# Patient Record
Sex: MALE | Race: WHITE | Employment: OTHER | URBAN - METROPOLITAN AREA
[De-identification: names, ages, dates, MRNs, and addresses within clinical notes are randomized per-mention and may not be internally consistent; named-entity substitution may affect disease eponyms.]

---

## 2017-07-18 ENCOUNTER — OFFICE VISIT (OUTPATIENT)
Dept: ORTHOPEDIC SURGERY | Age: 72
End: 2017-07-18

## 2017-07-18 VITALS
WEIGHT: 188.93 LBS | BODY MASS INDEX: 29.65 KG/M2 | SYSTOLIC BLOOD PRESSURE: 152 MMHG | DIASTOLIC BLOOD PRESSURE: 80 MMHG | HEART RATE: 62 BPM | HEIGHT: 67 IN

## 2017-07-18 DIAGNOSIS — M16.0 PRIMARY OSTEOARTHRITIS OF BOTH HIPS: ICD-10-CM

## 2017-07-18 DIAGNOSIS — R29.898 WEAKNESS OF BOTH HIPS: ICD-10-CM

## 2017-07-18 DIAGNOSIS — M51.36 DDD (DEGENERATIVE DISC DISEASE), LUMBAR: ICD-10-CM

## 2017-07-18 DIAGNOSIS — M70.70 HIP BURSITIS, UNSPECIFIED LATERALITY: ICD-10-CM

## 2017-07-18 DIAGNOSIS — M54.5 LOW BACK PAIN, UNSPECIFIED BACK PAIN LATERALITY, UNSPECIFIED CHRONICITY, WITH SCIATICA PRESENCE UNSPECIFIED: Primary | ICD-10-CM

## 2017-07-18 PROBLEM — M51.369 DDD (DEGENERATIVE DISC DISEASE), LUMBAR: Status: ACTIVE | Noted: 2017-07-18

## 2017-07-18 PROBLEM — M54.50 LOW BACK PAIN: Status: ACTIVE | Noted: 2017-07-18

## 2017-07-18 PROCEDURE — 20610 DRAIN/INJ JOINT/BURSA W/O US: CPT | Performed by: FAMILY MEDICINE

## 2017-07-18 PROCEDURE — 1123F ACP DISCUSS/DSCN MKR DOCD: CPT | Performed by: FAMILY MEDICINE

## 2017-07-18 PROCEDURE — G8427 DOCREV CUR MEDS BY ELIG CLIN: HCPCS | Performed by: FAMILY MEDICINE

## 2017-07-18 PROCEDURE — 3017F COLORECTAL CA SCREEN DOC REV: CPT | Performed by: FAMILY MEDICINE

## 2017-07-18 PROCEDURE — 99203 OFFICE O/P NEW LOW 30 MIN: CPT | Performed by: FAMILY MEDICINE

## 2017-07-18 PROCEDURE — G8419 CALC BMI OUT NRM PARAM NOF/U: HCPCS | Performed by: FAMILY MEDICINE

## 2017-07-18 PROCEDURE — 4040F PNEUMOC VAC/ADMIN/RCVD: CPT | Performed by: FAMILY MEDICINE

## 2017-07-18 PROCEDURE — 4004F PT TOBACCO SCREEN RCVD TLK: CPT | Performed by: FAMILY MEDICINE

## 2017-07-18 RX ORDER — DICLOFENAC SODIUM 75 MG/1
75 TABLET, DELAYED RELEASE ORAL 2 TIMES DAILY WITH MEALS
Qty: 60 TABLET | Refills: 3 | Status: SHIPPED | OUTPATIENT
Start: 2017-07-18 | End: 2018-03-29 | Stop reason: SDUPTHER

## 2017-07-25 ENCOUNTER — HOSPITAL ENCOUNTER (OUTPATIENT)
Dept: PHYSICAL THERAPY | Age: 72
Discharge: HOME | End: 2017-07-31
Attending: FAMILY MEDICINE | Admitting: FAMILY MEDICINE

## 2017-08-01 ENCOUNTER — HOSPITAL ENCOUNTER (OUTPATIENT)
Dept: PHYSICAL THERAPY | Age: 72
Discharge: HOME | End: 2017-08-31
Admitting: FAMILY MEDICINE

## 2017-09-01 ENCOUNTER — HOSPITAL ENCOUNTER (OUTPATIENT)
Dept: OTHER | Age: 72
Discharge: HOME | End: 2017-09-30
Attending: FAMILY MEDICINE | Admitting: FAMILY MEDICINE

## 2018-01-25 ENCOUNTER — OFFICE VISIT (OUTPATIENT)
Dept: ORTHOPEDIC SURGERY | Age: 73
End: 2018-01-25

## 2018-01-25 VITALS
BODY MASS INDEX: 30.31 KG/M2 | HEIGHT: 68 IN | WEIGHT: 200 LBS | HEART RATE: 64 BPM | SYSTOLIC BLOOD PRESSURE: 139 MMHG | DIASTOLIC BLOOD PRESSURE: 87 MMHG

## 2018-01-25 DIAGNOSIS — M16.0 PRIMARY OSTEOARTHRITIS OF BOTH HIPS: ICD-10-CM

## 2018-01-25 DIAGNOSIS — M51.36 DDD (DEGENERATIVE DISC DISEASE), LUMBAR: ICD-10-CM

## 2018-01-25 DIAGNOSIS — M70.62 TROCHANTERIC BURSITIS OF BOTH HIPS: ICD-10-CM

## 2018-01-25 DIAGNOSIS — R29.898 WEAKNESS OF BOTH HIPS: ICD-10-CM

## 2018-01-25 DIAGNOSIS — M70.61 TROCHANTERIC BURSITIS OF BOTH HIPS: ICD-10-CM

## 2018-01-25 DIAGNOSIS — M54.5 LOW BACK PAIN, UNSPECIFIED BACK PAIN LATERALITY, UNSPECIFIED CHRONICITY, WITH SCIATICA PRESENCE UNSPECIFIED: ICD-10-CM

## 2018-01-25 DIAGNOSIS — M25.551 RIGHT HIP PAIN: Primary | ICD-10-CM

## 2018-01-25 PROCEDURE — 20610 DRAIN/INJ JOINT/BURSA W/O US: CPT | Performed by: FAMILY MEDICINE

## 2018-01-25 PROCEDURE — 99214 OFFICE O/P EST MOD 30 MIN: CPT | Performed by: FAMILY MEDICINE

## 2018-01-25 RX ORDER — TAMSULOSIN HYDROCHLORIDE 0.4 MG/1
0.4 CAPSULE ORAL NIGHTLY PRN
COMMUNITY
End: 2022-05-31

## 2018-01-25 RX ORDER — DICLOFENAC SODIUM 75 MG/1
75 TABLET, DELAYED RELEASE ORAL 2 TIMES DAILY
Qty: 60 TABLET | Refills: 3 | Status: SHIPPED | OUTPATIENT
Start: 2018-01-25 | End: 2018-04-26

## 2018-01-25 NOTE — PROGRESS NOTES
pain to the lateral and posterior lateral aspect of his recurrent left hip. There is no history of injury or new activity prior to becoming symptomatic. He states that his current pain symptoms can at times be quite substantial at 7-8 out of 10 and are aggravated by positional changes and prolonged standing and walking. He does have some occasional achiness to the groin region but this is a different kind of pain from his lateral hip pain. Last summer he did attend about 3 sessions of therapy but admits he stopped doing his exercise program after her symptoms resolved. He has got back to taking his Voltaren twice daily over the last 4-6 weeks this has provided him some relief. He denies active hip locking catching or substantial consistent back pain and is not complaining of any radicular symptoms. Medical History  Patient's medications, allergies, past medical, surgical, social and family histories were reviewed and updated as appropriate. Review of Systems  Pertinent items are noted in HPI  Review of systems reviewed from Patient History Form dated on 7/18/2017 and available in the patient's chart under the Media tab. Vital Signs  Vitals:    01/25/18 1401   BP: 139/87   Pulse: 64       General Exam:     Constitutional: Patient is adequately groomed with no evidence of malnutrition  DTRs: Deep tendon reflexes are intact  Mental Status: The patient is oriented to time, place and person. The patient's mood and affect are appropriate. Lymphatic: The lymphatic examination bilaterally reveals all areas to be without enlargement or induration. Vascular: Examination reveals no swelling or calf tenderness. Peripheral pulses are palpable and 2+. Neurological: The patient has good coordination. There is no weakness or sensory deficit. Hip Examination    Inspection:  There is no high-grade deformity atrophy or soft tissue swelling involving the hips.     Palpation:  Most of his clinical tenderness seems to be over the trochanteric bursa laterally involving the hip right greater than left. He does have some mild IT band and anterior hip flexor discomfort mildly. He does have recurrent left lateral gluteal discomfort and some soreness with palpation of the lower lumbar spine and lumbar facets. Rang of Motion:  He does have reasonable hip motion with some tightness to his IT bands and hamstrings. Strength:  He does have symmetric 4-5 strength loss with resisted hip flexion and abduction. Adduction reasonably well preserved. Special Tests:  Trace positive Saul's testing right greater than. Negative log roll and straight leg raising. Negative Da's Valeria's testing. Skin: There are no rashes, ulcerations or lesions. Still motor sensory vascular exam appears intact. Gait: Reasonably fluid gait. No high-grade altalgia. Reflex symmetrically preserved    Additional Comments:     Additional Examinations:    Right Lower Extremity: Examination of the right lower extremity does not show any tenderness, deformity or injury. Range of motion is unremarkable. There is no gross instability. There are no rashes, ulcerations or lesions. Strength and tone are normal.  Left Lower Extremity: Examination of the left lower extremity does not show any tenderness, deformity or injury. Range of motion is unremarkable. There is no gross instability. There are no rashes, ulcerations or lesions. Strength and tone are normal.  Lower Back: Examination of the lower back does reveal some mild lumbar paraspinal and lower facet discomfort. He does have reasonable flexion to about 50-60. Lateral bending rotation are diminished by about 25%. Once again his straight leg raising appears to be fairly benign bilaterally. DTRs symmetrically preserved. Sensory exam is intact.       Diagnostic Test Findings:  AP pelvis and right hip frog films were obtained today and show at least moderate underlying right hip osteoarthritis with more mild degenerative changes to his left hip. Assessment :  #1. Roughly 6 weeks status post recurrent symptomatic right greater than left hip trochanteric bursitis with hip weakness and moderate right and mild underlying left hip osteoarthritis. #2.  Episodic mechanical low back pain with lumbar degenerative disc disease without signs of sarika radiculopathy    Impression:  Encounter Diagnoses   Name Primary?  Right hip pain Yes    Primary osteoarthritis of both hips     Trochanteric bursitis of both hips     Weakness of both hips     Low back pain, unspecified back pain laterality, unspecified chronicity, with sciatica presence unspecified     DDD (degenerative disc disease), lumbar        Office Procedures:  Orders Placed This Encounter   Procedures    XR HIP RIGHT (2-3 VIEWS)     Order Specific Question:   Reason for exam:     Answer:   pain    AZ ARTHROCENTESIS ASPIR&/INJ MAJOR JT/BURSA W/O US    [place] BETAMETHASONE ACET&SOD PHOSP       Treatment Plan:  Treatment options were discussed with Lexis Navarrete today. I do think we are primarily dealing with recurrent left hip trochanteric bursitis with hip weakness and IT band/hamstring inflexibility. He does have some underlying lumbar degenerative disc changes but I am not highly suspicious of prominent radiculopathy. Indications for further workup with imaging were discussed however he has had little in the way of treatment recently. He did very well with initial conservative treatment last summer up until early December 2017. We did repeat his bilateral hip trochanteric bursal injections today using 1 mL of Celestone, 1 of Marcaine, 1 of Xylocaine. He was strongly encouraged to get back into his exercise program and is aware that him stopping his exercises was likely a cause of his recurrent symptoms. He wishes to try home exercises as opposed to going back to therapy.   We would like for him to be continued on diclofenac 75 mg 1

## 2018-02-15 ENCOUNTER — OFFICE VISIT (OUTPATIENT)
Dept: ORTHOPEDIC SURGERY | Age: 73
End: 2018-02-15

## 2018-02-15 VITALS
BODY MASS INDEX: 31.39 KG/M2 | SYSTOLIC BLOOD PRESSURE: 139 MMHG | HEART RATE: 71 BPM | DIASTOLIC BLOOD PRESSURE: 80 MMHG | WEIGHT: 200 LBS | HEIGHT: 67 IN

## 2018-02-15 DIAGNOSIS — M16.11 PRIMARY OSTEOARTHRITIS OF RIGHT HIP: ICD-10-CM

## 2018-02-15 DIAGNOSIS — S76.011A STRAIN OF HIP FLEXOR, RIGHT, INITIAL ENCOUNTER: Primary | ICD-10-CM

## 2018-02-15 PROCEDURE — 99214 OFFICE O/P EST MOD 30 MIN: CPT | Performed by: FAMILY MEDICINE

## 2018-02-15 RX ORDER — METHYLPREDNISOLONE 4 MG/1
TABLET ORAL
Qty: 21 KIT | Refills: 0 | Status: SHIPPED | OUTPATIENT
Start: 2018-02-15 | End: 2018-03-29 | Stop reason: ALTCHOICE

## 2018-02-15 NOTE — PATIENT INSTRUCTIONS
If you're currently taking an anti-inflammatory such as advil, aleve, ibuprofen, diclofenac, naproxen, meloxicam, celebrex, or nabumetone, please stop. Take Medrol first for 6 days. This is a steroid pack.  Flip the package over to the foil side and the directions will tell you to start with 6 pills the first day, 5 pills the second day, etc.     Once you are finished with the medrol, then you may re-start or start your anti-inflammatory: Diclofenac

## 2018-02-20 ENCOUNTER — HOSPITAL ENCOUNTER (OUTPATIENT)
Dept: PHYSICAL THERAPY | Age: 73
Discharge: HOME | End: 2018-02-28
Attending: FAMILY MEDICINE | Admitting: FAMILY MEDICINE

## 2018-02-20 ENCOUNTER — TELEPHONE (OUTPATIENT)
Dept: ORTHOPEDIC SURGERY | Age: 73
End: 2018-02-20

## 2018-02-21 ENCOUNTER — HOSPITAL ENCOUNTER (OUTPATIENT)
Dept: PHYSICAL THERAPY | Age: 73
Discharge: HOME | End: 2018-02-28
Admitting: FAMILY MEDICINE

## 2018-02-21 NOTE — PLAN OF CARE
Right Comments   FABERS  + Pain in groin   Scour test      Impingement test      Trendelenburg test                                     [x] Patient history, allergies, meds reviewed. Medical chart reviewed. See intake form. Review Of Systems (ROS):  [x]Performed Review of systems (Integumentary, CardioPulmonary, Neurological) by intake and observation. Intake form has been scanned into medical record. Patient has been instructed to contact their primary care physician regarding ROS issues if not already being addressed at this time. Co-morbidities/Complexities (which will affect course of rehabilitation):   []None           Arthritic conditions   []Rheumatoid arthritis (M05.9)  []Osteoarthritis (M19.91)   Cardiovascular conditions   []Hypertension (I10)  []Hyperlipidemia (E78.5)  []Angina pectoris (I20)  []Atherosclerosis (I70)   Musculoskeletal conditions   []Disc pathology   []Congenital spine pathologies   []Prior surgical intervention  []Osteoporosis (M81.8)  []Osteopenia (M85.8)   Endocrine conditions   []Hypothyroid (E03.9)  []Hyperthyroid Gastrointestinal conditions   []Constipation (M72.51)   Metabolic conditions   []Morbid obesity (E66.01)  []Diabetes type 1(E10.65) or 2 (E11.65)   []Neuropathy (G60.9)     Pulmonary conditions   []Asthma (J45)  []Coughing   []COPD (J44.9)   Psychological Disorders  []Anxiety (F41.9)  []Depression (F32.9)   []Other:   []Other:          Barriers to/and or personal factors that will affect rehab potential:              []Age  []Sex              []Motivation/Lack of Motivation                        []Co-Morbidities              []Cognitive Function, education/learning barriers              []Environmental, home barriers              []profession/work barriers  []past PT/medical experience  []other:  Justification: weight    Falls Risk Assessment (30 days):   [x] Falls Risk assessed and no intervention required.   [] Falls Risk assessed and Patient requires intervention

## 2018-02-21 NOTE — FLOWSHEET NOTE
[address] [facility], [place]                                                         Physical Therapy Daily Treatment Note  Date:  2018    Patient Name:  Kervin Kim    :  1945  MRN: [id number]    Medical/Treatment Diagnosis Information:  · Diagnosis: L20.488 (ICD-10-CM) - Strain of hip flexor, right, M16.11 (ICD-10-CM) - Primary osteoarthritis of right hip. Onset- around 2018  · Treatment Diagnosis: right hip pain- O23.451   Insurance/Certification information:  PT Insurance Information: [address] [place] [address]   Physician Information:  Referring Practitioner: Adeline Mcghee  kristi signed (Y/N):     Date of Patient follow up with Physician:  1 Mar 2018 with Dr. Rosanna Schaffer (if applicable):      Date G-Code Applied:  2018  PT G-Codes  Functional Assessment Tool Used: WOMAC  Score: 70%  Functional Limitation: Mobility: Walking and moving around  Mobility: Walking and Moving Around Current Status ():  At least 20 percent but less than 40 percent impaired, limited or restricted  Mobility: Walking and Moving Around Goal Status (): 0 percent impaired, limited or restricted    Progress Note: [x]  Yes  []  No  Next due by: Visit #10  Or 21 Mar 2018     Latex Allergy:  [x]NO      []YES  Preferred Language for Healthcare:   [x]English       []other:    Visit # Insurance Allowable   1 Medical necessity     Pain level:  See eval     SUBJECTIVE:  See eval    OBJECTIVE: See eval  Observation:   Test measurements:    ROM PROM AROM Comments    Left Right Left Right    Flexion        Extension        Abduction        Adduction        ER        IR                  Flexibility Left Right Comments   Hamstrings      ITB (Obers test)      Hip flexor Tristen Jevon test)      gastroc      Rectus femoris (Elys test)              Special  Test Left Right Comments   FABERS      Scour test      Impingement test      Trendelenburg test

## 2018-02-28 ENCOUNTER — OFFICE VISIT (OUTPATIENT)
Dept: ORTHOPEDIC SURGERY | Age: 73
End: 2018-02-28

## 2018-02-28 ENCOUNTER — HOSPITAL ENCOUNTER (OUTPATIENT)
Dept: PHYSICAL THERAPY | Age: 73
Discharge: HOME | End: 2018-03-01
Admitting: FAMILY MEDICINE

## 2018-02-28 VITALS
WEIGHT: 200 LBS | BODY MASS INDEX: 31.39 KG/M2 | HEART RATE: 56 BPM | SYSTOLIC BLOOD PRESSURE: 153 MMHG | HEIGHT: 67 IN | DIASTOLIC BLOOD PRESSURE: 77 MMHG

## 2018-02-28 DIAGNOSIS — M16.11 PRIMARY OSTEOARTHRITIS OF RIGHT HIP: Primary | ICD-10-CM

## 2018-02-28 PROCEDURE — 20611 DRAIN/INJ JOINT/BURSA W/US: CPT | Performed by: ORTHOPAEDIC SURGERY

## 2018-02-28 NOTE — PROGRESS NOTES
ULTRASOUND GUIDED HIP INJECTION    Jose Cruz Russell    February 28, 2018    Chief Complaint   Patient presents with    Follow-up     Right Hip arthrogram injection. Sent by Dr Violeta Mullins       BP (!) 153/77   Pulse 56   Ht 5' 7\" (1.702 m)   Wt 200 lb (90.7 kg)   BMI 31.32 kg/m²     Natacha Fields is here in follow-up regarding his right hip. His pain is 8/10. He has complaints of right hip pain for proximal and 8 months without injury. The pain is unresponsive to a trochanteric injection given about 3 weeks ago and has been only partially responsive to a Medrol Dosepak. He does have radiographic evidence of severe osteoarthritis of his right hip. Physical Exam:   Examination of the righthip shows a positive logroll. No Lesion of the skin is noted over the injection site    Impression:  right hip osteoarthritis. Plan:  1. Injection with cortisone was recommended into  right   hip joint using ultrasound visualization. He understands the risks and benefits of the injection and describes no potential allergies. Time out was performed to verify correct person, correct procedure, and correct site. 2. Ultrasound visualization was first performed utilizing the [facility] Ultrasound unit using an 5/2 MHz Curved Probe. finding the femoral neck head junction. Next the femoral artery and vein were visualized with ultrasound medial to the femoral head. The location of the needle insertion was determined well lateral to the neurovascular structures and the site was anesthetized with 5 mL of 1% Lidocaine. The site was then prepped with ChloraPrep. A sterile cover was placed over the transducer head and the femoral head neck junction once again visualized. A 20-gauge spinal needle was then introduced under ultrasound control to the head neck junction. Once the needle was intracapsular the hip joint was injected with 5 mL  of 1% Lidocaine mixed with 2 ml of 40 mg Depo Medrol.   Edgard tolerated the procedure well anddid report partial relief of  hip pain within 5 minutes of the injection. 3.  He was instructed to resume his home exercise program.    4. He is returned to Dr. Adele Brush [facility].        Dominik Diaz MD  2/28/2018     Depo-Medrol:  NDC: 8754-2268-97  Lot #: [id number]  Expiration Date: 04/2020

## 2018-02-28 NOTE — FLOWSHEET NOTE
[address]    Physical Therapy Phone: [phone]    Fax: [phone]  _________________________________________________________________    Physical Therapy  Cancellation/No-show Note  Patient Name:  Corwin Albarado  :  1945   Date:  2018  Cancelled visits to date: 1  No-shows to date: 0    For today's appointment patient:  [x]  Cancelled  [x]  Rescheduled appointment  []  No-show     Reason given by patient:  []  Patient ill  []  Conflicting appointment  []  No transportation    []  Conflict with work  []  No reason given  [x]  Other:     Comments:  Pt comes in stating he was just seen by Dr. Babs Barton and received an injection in his hip. I did discuss this with Dr. Tatianna Machado staff, and we decided to wait at least until tomorrow to do tx. He was rescheduled to tomorrow.        Electronically signed by:  Jamila Gutiérrez

## 2018-02-28 NOTE — PATIENT INSTRUCTIONS
Impression:  right hip osteoarthritis. Plan:  1. Injection with cortisone was recommended into  right   hip joint using ultrasound visualization. He understands the risks and benefits of the injection and describes no potential allergies. Time out was performed to verify correct person, correct procedure, and correct site. 2. Ultrasound visualization was first performed utilizing the [facility] Ultrasound unit using an 5/2 MHz Curved Probe. finding the femoral neck head junction. Next the femoral artery and vein were visualized with ultrasound medial to the femoral head. The location of the needle insertion was determined well lateral to the neurovascular structures and the site was anesthetized with 5 mL of 1% Lidocaine. The site was then prepped with ChloraPrep. A sterile cover was placed over the transducer head and the femoral head neck junction once again visualized. A 20-gauge spinal needle was then introduced under ultrasound control to the head neck junction. Once the needle was intracapsular the hip joint was injected with 5 mL  of 1% Lidocaine mixed with 2 ml of 40 mg Depo Medrol. Edgard tolerated the procedure well anddid report partial relief of  hip pain within 5 minutes of the injection. 3.  He was instructed to resume his home exercise program.    [facility]. [facility]        Gia Ventura MD  2/28/2018

## 2018-03-01 ENCOUNTER — HOSPITAL ENCOUNTER (OUTPATIENT)
Dept: OTHER | Age: 73
Discharge: HOME | End: 2018-03-31
Attending: FAMILY MEDICINE | Admitting: FAMILY MEDICINE

## 2018-03-01 ENCOUNTER — HOSPITAL ENCOUNTER (OUTPATIENT)
Dept: PHYSICAL THERAPY | Age: 73
Discharge: HOME | End: 2018-03-02
Admitting: FAMILY MEDICINE

## 2018-03-01 NOTE — FLOWSHEET NOTE
Flexibility Left Right Comments   Hamstrings      ITB (Obers test)      Hip flexor Marcelo Reichmann test)      gastroc      Rectus femoris (Elys test)              Special  Test Left Right Comments   FABERS      Scour test      Impingement test      Trendelenburg test              Strength Left Right Comments   Hip flexors      Hip extension      Hip abduction      Hip adduction      Hip ER      Hip IR      Quads      Hamstrings            RESTRICTIONS/PRECAUTIONS:     Exercises/Interventions:     Exercise/Equipment Repetitions Other comments   Stretching     Hamstring 5x:30    Hip Flexion     ITB- Rope     Grion     Quad     Inclined Calf     Towel Pull     Piriformis     SKTC 10x:10              SLR     Supine 3x10    Prone     Abduction 3x10    Adducton     SLR+          Isometrics     Quad sets     Ball Squeezes 10x:10    Patellar Glides     Medial     Superior     Inferior          ROM     Passive     Active     Weight Shift     Hang Weights     Sheet Pulls     Ankle Pumps          CKC     Calf raises 3x10    Wall sits     Step ups     1 leg stand 5x:15    Squatting     CC TKE     Balance     Monster Walks     Bridging     Triple threats     Stool Scoots     PRE     Extension  RANGE:   Flexion  RANGE:        Cable Column          Leg Press  RANGE:        Bike     Treadmill                Therapeutic Exercise and NMR EXR  [x] ([id number]) Provided verbal/tactile cueing for activities related to strengthening, flexibility, endurance, ROM for improvements in LE, proximal hip, and core control with self care, mobility, lifting, ambulation.  [] (20006) Provided verbal/tactile cueing for activities related to improving balance, coordination, kinesthetic sense, posture, motor skill, proprioception  to assist with LE, proximal hip, and core control in self care, mobility, lifting, ambulation and eccentric single leg control.      NMR and Therapeutic Activities:    [x] (52570 or 89693) Provided verbal/tactile cueing for [] VASO  [] Manual (76641) x       [] Other:  [x] TA x  1    [] Mech Traction (06313)  [] ES(attended) (23202)      [] ES (un) (41006):     GOALS:  Patient stated goal: improve flexibility, pain relief, walking, standing, sitting    Therapist goals for Patient:   Short Term Goals: To be achieved in: 2 weeks  1. Pt will be independent HEP and progression per patient tolerance, in order to prevent re-injury. 2. Patient will have a decrease in pain of 6/10 at worst to facilitate improvement in movement, function, and ADLs as indicated by functional deficits. Long Term Goals: To be achieved in: 6 weeks  1. Pt will demo a WOMAC of 50% or greater to assist with reaching prior level of function. 2. Patient will demonstrate increased AROM hip flexion to greater than or equal to 100 to allow for proper joint functioning as indicated by patients functional deficits. 3. Patient will demonstrate an increase in strength to 4+ to allow for proper functional mobility as indicated by patient's functional deficits. 4. Patient will return to walking in the home without increased symptoms or restriction. 5.  Pt will report pain at worst less than or equal to 4/10. 6. Pt will be able to sit without increased pain. Progression Towards Functional goals:  [] Patient is progressing as expected towards functional goals listed. [] Progression is slowed due to complexities listed. [] Progression has been slowed due to co-morbidities. [x] Plan just implemented, too soon to assess goals progression  [] Other:     ASSESSMENT:      Treatment/Activity Tolerance:  [] Patient tolerated treatment well [] Patient limited by fatigue  [] Patient limited by pain  [] Patient limited by other medical complications  [x] Other:  Pt job tx fair. He was limited by pain and fatigue today as he was sore coming in to tx. He also did too many hip ADD exercises as he forgot how many to do despite multiple cuing.        Prognosis: [] Good [] Fair  [] Poor    Patient Requires Follow-up: [x] Yes  [] No    PLAN: Progress per pt to. Consider hip mobs.     [x] Continue per plan of care [] Alter current plan (see comments)  [] Plan of care initiated [] Hold pending MD visit [] Discharge    Electronically signed by: Mariely Tucker DPT [id number]

## 2018-03-08 ENCOUNTER — HOSPITAL ENCOUNTER (OUTPATIENT)
Dept: PHYSICAL THERAPY | Age: 73
Discharge: HOME | End: 2018-03-09
Admitting: FAMILY MEDICINE

## 2018-03-08 NOTE — FLOWSHEET NOTE
[address] [facility], [place]                                                         Physical Therapy Daily Treatment Note  Date:  3/8/2018    Patient Name:  Mira Chong    :  1945  MRN: [id number]    Medical/Treatment Diagnosis Information:  · Diagnosis: S81.939 (ICD-10-CM) - Strain of hip flexor, right, M16.11 (ICD-10-CM) - Primary osteoarthritis of right hip. Onset- around 2018  · Treatment Diagnosis: right hip pain- O77.645   Insurance/Certification information:  PT Insurance Information: [address] [place] [address]   Physician Information:  Referring Practitioner: Rock Bologna of care signed (Y/N):     Date of Patient follow up with Physician:  1 Mar 2018 with Dr. Nury Ayala (if applicable):      Date G-Code Applied:  2018    PT G-Codes  Functional Assessment Tool Used: WOMAC  Score: 70%  Functional Limitation: Mobility: Walking and moving around  Mobility: Walking and Moving Around Current Status (): At least 20 percent but less than 40 percent impaired, limited or restricted  Mobility: Walking and Moving Around Goal Status (): 0 percent impaired, limited or restricted    Progress Note: []  Yes  [x]  No  Next due by: Visit #10  Or 21 Mar 2018     Latex Allergy:  [x]NO      []YES  Preferred Language for Healthcare:   [x]English       []other:    Visit # Insurance Allowable   3 Medical necessity     Pain level:  0/10    SUBJECTIVE:  He feels it keeps getting better. He is wondering if the pain medications are helping. He is taking diclofanac. He feels weaker on his right leg. He has some difficulty with steps. He did go to the gym, but the pool is closed currently. OBJECTIVE: 3/8/18   Observation: pt enters clinic with antalgic gt.   Test measurements:    ROM PROM AROM Comments    Left Right Left Right    Flexion    95    Extension        Abduction        Adduction        ER        IR Flexibility Left Right Comments   Hamstrings      ITB (Obers test)      Hip flexor Giulia Nay test)      gastroc      Rectus femoris (Elys test)              Special  Test Left Right Comments   FABERS      Scour test      Impingement test      Trendelenburg test              Strength Left Right Comments   Hip flexors      Hip extension      Hip abduction      Hip adduction      Hip ER      Hip IR      Quads      Hamstrings            RESTRICTIONS/PRECAUTIONS:     Exercises/Interventions:     Exercise/Equipment Repetitions Other comments   Stretching     Hamstring 5x:30    Hip Flexion     ITB- Rope 5x:30    Grion     Quad     Inclined Calf     Towel Pull     Piriformis     SKTC 10x:10              SLR     Supine 3x10    Prone     Abduction 3x10    Adducton 3x10    SLR+     clams 3x10 3#              Isometrics     Quad sets     Ball Squeezes     Patellar Glides     Medial     Superior     Inferior          ROM     Passive     Active     Weight Shift     Hang Weights     Sheet Pulls     Ankle Pumps          CKC     Calf raises 3x10    Wall sits     Step ups 3x10 L1    1 leg stand 5x:15    Squatting     CC TKE     Balance     Monster Walks     Bridging 2x10 Fair form   Triple threats     Sit to stand 3x10         Stool Scoots     PRE     Extension  RANGE:   Flexion  RANGE:        Cable Column          Leg Press  RANGE:        Bike 8' L6.5 Consider decreasing resistance   Treadmill                Therapeutic Exercise and NMR EXR  [x] (25960) Provided verbal/tactile cueing for activities related to strengthening, flexibility, endurance, ROM for improvements in LE, proximal hip, and core control with self care, mobility, lifting, ambulation.  [] (64263) Provided verbal/tactile cueing for activities related to improving balance, coordination, kinesthetic sense, posture, motor skill, proprioception  to assist with LE, proximal hip, and core control in self care, mobility, lifting, ambulation and eccentric single leg

## 2018-03-22 ENCOUNTER — HOSPITAL ENCOUNTER (OUTPATIENT)
Dept: PHYSICAL THERAPY | Age: 73
Discharge: HOME | End: 2018-03-23
Admitting: FAMILY MEDICINE

## 2018-03-22 NOTE — PLAN OF CARE
[address]                   Physical Therapy Re-Certification Plan of Care    Dear Dr. Melquiades Awan,    We had the pleasure of treating the following patient for physical therapy services at [address] [place] [address]. A summary of our findings can be found in the updated assessment below. This includes our plan of care. If you have any questions or concerns regarding these findings, please do not hesitate to contact me at the office phone number checked above. Thank you for the referral.     Physician Signature:________________________________Date:__________________  By signing above (or electronic signature), therapists plan is approved by physician    Date Range Of Visits: 18-3/22/2018  Total Visits to Date: 4  Overall Response to Treatment:   [x]Patient is responding well to treatment and improvement is noted with regards  to goals   []Patient should continue to improve in reasonable time if they continue HEP   []Patient has plateaued and is no longer responding to skilled PT intervention    []Patient is getting worse and would benefit from return to referring MD   []Patient unable to adhere to initial POC   []Other: Pt job tx fair/well. He reports soreness 2 days after tx. I did modify his POC slightly as he is not having as much groin pain, but he continues to have pain in his back and down his right leg. He was encouraged to f/u with Dr. Melquiades Awan per his last note. We discussed referral patterns of pain. Pt does continue to have significant pain (8/10) that occurs randomly. However, he does report significantly less disability on his functional scale.                                                            Physical Therapy Daily Treatment Note  Date:  3/22/2018    Patient Name:  Hoa Carvalho    :  1945  MRN: [id number]    Medical/Treatment Diagnosis Information:  · Diagnosis: S76.011 (ICD-10-CM) - Strain of hip flexor, right, M16.11 (ICD-10-CM) - Primary osteoarthritis of right hip. Onset- around Jan 2018  · Treatment Diagnosis: right hip pain- A29.565   Insurance/Certification information:  PT Insurance Information: Lina Meyers  Physician Information:  Referring Practitioner: Tessy Alfred of care signed (Y/N):     Date of Patient follow up with Physician:  1 Mar 2018 with Dr. Hernandez Gonzalez (if applicable):      Date G-Code Applied:  3/22/2018  PT G-Codes  Functional Assessment Tool Used: WOMAC  Score: 51%  Functional Limitation: Mobility: Walking and moving around  Mobility: Walking and Moving Around Current Status (): At least 40 percent but less than 60 percent impaired, limited or restricted  Mobility: Walking and Moving Around Goal Status (): 0 percent impaired, limited or restricted     Progress Note: []  Yes  [x]  No  Next due by: Visit #10  Or 22 Apr 2018     Latex Allergy:  [x]NO      []YES  Preferred Language for Healthcare:   [x]English       []other:    Visit # Insurance Allowable   4 Medical necessity     Pain level:  0/10    SUBJECTIVE:  He has no c/o pain now. He stopped using the hip spica wrap a week after seeing Dr. Cameron Garibay. He is taking Advil (6 a day at 200 mg a piece). He feels like the injection from Dr. Bhanu Terry wasn't as effective as the previous ones from Dr. Cameron Garibay. However, his pain down the front of his groin is gone. He does have pain around the back pocket, belt line, and when it's bad down his leg. He hasn't seen Dr. Cameron Garibay yet for a f/u. OBJECTIVE: 3/22/18   Observation: pt enters clinic with antalgic gt.   Test measurements:    ROM PROM AROM Comments    Left Right Left Right    Flexion    100    Extension        Abduction        Adduction        ER        IR                  Flexibility Left Right Comments   Hamstrings      ITB (Obers test)      Hip flexor Lakshmi Josue test)      gastroc      Rectus femoris (Elys test)              Special  Test Left Right Comments   FABERS      Scour test      Impingement test      Trendelenburg test              Strength Left Right Comments   Hip flexors  4    Hip extension      Hip abduction  4-    Hip adduction      Hip ER      Hip IR      Quads      Hamstrings            RESTRICTIONS/PRECAUTIONS:     Exercises/Interventions:     Exercise/Equipment Repetitions Other comments   Stretching     Hamstring 5x:30    Hip Flexion     ITB- Rope 5x:30    Grion     Quad     Inclined Calf     Towel Pull     Piriformis 5x:30 seated   SKTC 10x:10    LTR 10x:10         SLR     Supine 3x10    Prone     Abduction 3x10    Adducton 3x10    SLR+     clams 3x10 3#              Isometrics     Quad sets     Ball Squeezes     Patellar Glides     Medial     Superior     Inferior          ROM     Passive     Active     Weight Shift     Hang Weights     Sheet Pulls     Ankle Pumps          CKC     Calf raises 3x10    Wall sits     Step ups    1 leg stand    Squatting     CC TKE     Balance     Monster Walks     Bridging Triple threats     Sit to stand     Stool Scoots          PPT 10x:10              PRE     Extension  RANGE:   Flexion  RANGE:        Cable Column          Leg Press  RANGE:        Bike 8' L4    Treadmill                Therapeutic Exercise and NMR EXR  [x] (55286) Provided verbal/tactile cueing for activities related to strengthening, flexibility, endurance, ROM for improvements in LE, proximal hip, and core control with self care, mobility, lifting, ambulation.  [] (14225) Provided verbal/tactile cueing for activities related to improving balance, coordination, kinesthetic sense, posture, motor skill, proprioception  to assist with LE, proximal hip, and core control in self care, mobility, lifting, ambulation and eccentric single leg control.      NMR and Therapeutic Activities:    [x] (20165 or 55499) Provided verbal/tactile cueing for activities related to improving balance, coordination,

## 2018-03-29 ENCOUNTER — OFFICE VISIT (OUTPATIENT)
Dept: ORTHOPEDIC SURGERY | Age: 73
End: 2018-03-29

## 2018-03-29 ENCOUNTER — HOSPITAL ENCOUNTER (OUTPATIENT)
Dept: PHYSICAL THERAPY | Age: 73
Discharge: HOME | End: 2018-03-30
Admitting: FAMILY MEDICINE

## 2018-03-29 VITALS
HEIGHT: 67 IN | WEIGHT: 199.96 LBS | HEART RATE: 64 BPM | SYSTOLIC BLOOD PRESSURE: 123 MMHG | BODY MASS INDEX: 31.38 KG/M2 | DIASTOLIC BLOOD PRESSURE: 67 MMHG

## 2018-03-29 DIAGNOSIS — R29.898 WEAKNESS OF BOTH HIPS: ICD-10-CM

## 2018-03-29 DIAGNOSIS — M51.36 DDD (DEGENERATIVE DISC DISEASE), LUMBAR: ICD-10-CM

## 2018-03-29 DIAGNOSIS — S76.011A STRAIN OF HIP FLEXOR, RIGHT, INITIAL ENCOUNTER: ICD-10-CM

## 2018-03-29 DIAGNOSIS — M16.11 PRIMARY OSTEOARTHRITIS OF RIGHT HIP: ICD-10-CM

## 2018-03-29 DIAGNOSIS — M70.61 TROCHANTERIC BURSITIS OF BOTH HIPS: ICD-10-CM

## 2018-03-29 DIAGNOSIS — M70.62 TROCHANTERIC BURSITIS OF BOTH HIPS: ICD-10-CM

## 2018-03-29 DIAGNOSIS — M16.0 PRIMARY OSTEOARTHRITIS OF BOTH HIPS: Primary | ICD-10-CM

## 2018-03-29 PROCEDURE — 99213 OFFICE O/P EST LOW 20 MIN: CPT | Performed by: FAMILY MEDICINE

## 2018-03-29 RX ORDER — IBUPROFEN 800 MG/1
800 TABLET ORAL EVERY 8 HOURS PRN
Qty: 90 TABLET | Refills: 3 | Status: SHIPPED | OUTPATIENT
Start: 2018-03-29

## 2018-03-29 RX ORDER — METHYLPREDNISOLONE 4 MG/1
TABLET ORAL
Qty: 1 KIT | Refills: 0 | Status: SHIPPED | OUTPATIENT
Start: 2018-03-29 | End: 2018-04-26

## 2018-03-29 NOTE — FLOWSHEET NOTE
mobility, lifting, ambulation and eccentric single leg control. NMR and Therapeutic Activities:    [x] (59428 or 93240) Provided verbal/tactile cueing for activities related to improving balance, coordination, kinesthetic sense, posture, motor skill, proprioception and motor activation to allow for proper function of core, proximal hip and LE with self care and ADLs  [] (88202) Gait Re-education- Provided training and instruction to the patient for proper LE, core and proximal hip recruitment and positioning and eccentric body weight control with ambulation re-education including up and down stairs     Home Exercise Program:    [x] (73366) Reviewed/Progressed HEP activities related to strengthening, flexibility, endurance, ROM of core, proximal hip and LE for functional self-care, mobility, lifting and ambulation/stair navigation   [] (72629)Reviewed/Progressed HEP activities related to improving balance, coordination, kinesthetic sense, posture, motor skill, proprioception of core, proximal hip and LE for self care, mobility, lifting, and ambulation/stair navigation      Manual Treatments:  PROM / STM / Oscillations-Mobs:  G-I, II, III, IV (PA's, Inf., Post.)  [] (64431) Provided manual therapy to mobilize LE, proximal hip and/or LS spine soft tissue/joints for the purpose of modulating pain, promoting relaxation,  increasing ROM, reducing/eliminating soft tissue swelling/inflammation/restriction, improving soft tissue extensibility and allowing for proper ROM for normal function with self care, mobility, lifting and ambulation.      Other:         Modalities:  declined    Charges:   Timed Code Treatment Minutes: 60'   Total Treatment Minutes: 61'     [] EVAL (LOW) 455 1011   [] EVAL (MOD) 43854   [] EVAL (HIGH) 33802   [] RE-EVAL   [x] NX(11574) x  2   [] IONTO  [x] NMR (46851) x  1   [] VASO  [] Manual (30078) x       [] Other:  [x] TA x  1    [] Mech Traction (84984)  [] ES(attended) (26140)      [] ES (un)

## 2018-04-01 ENCOUNTER — HOSPITAL ENCOUNTER (OUTPATIENT)
Dept: OTHER | Age: 73
Discharge: HOME | End: 2018-04-30
Attending: FAMILY MEDICINE | Admitting: FAMILY MEDICINE

## 2018-04-03 ENCOUNTER — HOSPITAL ENCOUNTER (OUTPATIENT)
Dept: PHYSICAL THERAPY | Age: 73
Discharge: HOME | End: 2018-04-04
Admitting: FAMILY MEDICINE

## 2018-04-03 NOTE — FLOWSHEET NOTE
Special  Test Left Right Comments   FABERS      Scour test      Impingement test      Trendelenburg test              Strength Left Right Comments   Hip flexors      Hip extension      Hip abduction      Hip adduction      Hip ER      Hip IR      Quads      Hamstrings            RESTRICTIONS/PRECAUTIONS:     Exercises/Interventions:     Exercise/Equipment Repetitions Other comments   Stretching     Hamstring 5x:30    Hip Flexion     ITB- Rope 5x:30    Grion     Quad     Inclined Calf     Towel Pull     Piriformis 5x:30 seated   SKTC 10x:10    LTR 10x:10         SLR     Supine 3x10 1#    Prone 3x10 1# Knee flexed    Abduction 4x10 1# Increase NV   Adducton 3x10 1#    SLR+     clams 3x10 6#                   Isometrics     Quad sets     Ball Squeezes     Patellar Glides     Medial     Superior     Inferior          ROM     Passive     Active     Weight Shift     Hang Weights     Sheet Pulls     Ankle Pumps          CKC     Calf raises 2x10 SL    Wall sits     Step ups    1 leg stand 5x:15   Squatting     CC TKE     Balance Biodex PS L10 4'  Cuing required for proper posture   [employer]     Bridging with marches [id number]     Sit to stand     Stool Scoots          PPT 10x:10 Cuing required. PRE     Extension  RANGE:   Flexion  RANGE:        Cable Column          Leg Press 2x10 110#, 10x 130# RANGE: 70/10        Bike 8' L4    Treadmill                Therapeutic Exercise and NMR EXR  [x] (67501) Provided verbal/tactile cueing for activities related to strengthening, flexibility, endurance, ROM for improvements in LE, proximal hip, and core control with self care, mobility, lifting, ambulation.  [] (88434) Provided verbal/tactile cueing for activities related to improving balance, coordination, kinesthetic sense, posture, motor skill, proprioception  to assist with LE, proximal hip, and core control in self care, mobility, lifting, ambulation and eccentric single leg control.      NMR walking, standing, sitting    Therapist goals for Patient:   Short Term Goals: To be achieved in: 2 weeks  1. Pt will be independent HEP and progression per patient tolerance, in order to prevent re-injury. -met  2. Patient will have a decrease in pain of 6/10 at worst to facilitate improvement in movement, function, and ADLs as indicated by functional deficits.-ongoing. Pt reports pain was 8/10 yesterday. Long Term Goals: To be achieved in: 6 weeks  1. Pt will demo a WOMAC of 50% or greater to assist with reaching prior level of function. -onoging  2. Patient will demonstrate increased AROM hip flexion to greater than or equal to 100 to allow for proper joint functioning as indicated by patients functional deficits. -met  3. Patient will demonstrate an increase in strength to 4+ to allow for proper functional mobility as indicated by patient's functional deficits. -ongoing  4. Patient will return to walking in the home without increased symptoms or restriction. 5.  Pt will report pain at worst less than or equal to 4/10.- ongoing  6. Pt will be able to sit without increased pain. -ongoing as he has pain in the car     Progression Towards Functional goals:  [] Patient is progressing as expected towards functional goals listed. [] Progression is slowed due to complexities listed. [] Progression has been slowed due to co-morbidities. [x] Plan just implemented, too soon to assess goals progression  [] Other:     ASSESSMENT:      Treatment/Activity Tolerance:  [] Patient tolerated treatment well [] Patient limited by fatigue  [] Patient limited by pain  [] Patient limited by other medical complications  [x] Other: Pt job tx well. He struggles with calf strength and balance. He demo general deconditioning. He was able to do the biodex, but he required cuing for proper posture as he tends to go in to extreme lordosis.         Prognosis: [] Good [] Fair  [] Poor    Patient Requires Follow-up: [x] Yes  [] No    PLAN: Progress pt strength and flexibility per pt job. Continue to work on endurance.     [x] Continue per plan of care [] Alter current plan (see comments)  [] Plan of care initiated [] Hold pending MD visit [] Discharge    Electronically signed by:      Oscar Simmons DPT [id number]

## 2018-04-12 ENCOUNTER — TELEPHONE (OUTPATIENT)
Dept: PHYSICAL THERAPY | Age: 73
End: 2018-04-12

## 2018-04-26 ENCOUNTER — OFFICE VISIT (OUTPATIENT)
Dept: ORTHOPEDIC SURGERY | Age: 73
End: 2018-04-26

## 2018-04-26 VITALS
BODY MASS INDEX: 31.23 KG/M2 | HEART RATE: 76 BPM | DIASTOLIC BLOOD PRESSURE: 76 MMHG | WEIGHT: 199 LBS | HEIGHT: 67 IN | SYSTOLIC BLOOD PRESSURE: 132 MMHG

## 2018-04-26 DIAGNOSIS — M25.551 RIGHT HIP PAIN: ICD-10-CM

## 2018-04-26 DIAGNOSIS — M70.61 GREATER TROCHANTERIC BURSITIS OF RIGHT HIP: Primary | ICD-10-CM

## 2018-04-26 DIAGNOSIS — M16.11 PRIMARY OSTEOARTHRITIS OF RIGHT HIP: ICD-10-CM

## 2018-04-26 PROCEDURE — 20610 DRAIN/INJ JOINT/BURSA W/O US: CPT | Performed by: FAMILY MEDICINE

## 2018-04-26 PROCEDURE — 99213 OFFICE O/P EST LOW 20 MIN: CPT | Performed by: FAMILY MEDICINE

## 2018-05-01 ENCOUNTER — HOSPITAL ENCOUNTER (OUTPATIENT)
Dept: OTHER | Age: 73
Discharge: HOME | End: 2018-05-31
Attending: FAMILY MEDICINE | Admitting: FAMILY MEDICINE

## 2018-06-07 ENCOUNTER — OFFICE VISIT (OUTPATIENT)
Dept: ORTHOPEDIC SURGERY | Age: 73
End: 2018-06-07

## 2018-06-07 VITALS
WEIGHT: 200 LBS | BODY MASS INDEX: 31.39 KG/M2 | SYSTOLIC BLOOD PRESSURE: 130 MMHG | HEIGHT: 67 IN | DIASTOLIC BLOOD PRESSURE: 80 MMHG | HEART RATE: 72 BPM

## 2018-06-07 DIAGNOSIS — R29.898 WEAKNESS OF BOTH HIPS: ICD-10-CM

## 2018-06-07 DIAGNOSIS — M70.61 GREATER TROCHANTERIC BURSITIS OF RIGHT HIP: Primary | ICD-10-CM

## 2018-06-07 DIAGNOSIS — M25.551 RIGHT HIP PAIN: ICD-10-CM

## 2018-06-07 DIAGNOSIS — M16.11 PRIMARY OSTEOARTHRITIS OF RIGHT HIP: ICD-10-CM

## 2018-06-07 PROCEDURE — 99214 OFFICE O/P EST MOD 30 MIN: CPT | Performed by: FAMILY MEDICINE

## 2018-06-07 PROCEDURE — 20610 DRAIN/INJ JOINT/BURSA W/O US: CPT | Performed by: FAMILY MEDICINE

## 2018-07-12 ENCOUNTER — OFFICE VISIT (OUTPATIENT)
Dept: ORTHOPEDIC SURGERY | Age: 73
End: 2018-07-12

## 2018-07-12 VITALS
HEART RATE: 77 BPM | WEIGHT: 205 LBS | SYSTOLIC BLOOD PRESSURE: 139 MMHG | HEIGHT: 67 IN | DIASTOLIC BLOOD PRESSURE: 78 MMHG | BODY MASS INDEX: 32.18 KG/M2

## 2018-07-12 DIAGNOSIS — M16.11 PRIMARY OSTEOARTHRITIS OF RIGHT HIP: Primary | ICD-10-CM

## 2018-07-12 DIAGNOSIS — M70.61 GREATER TROCHANTERIC BURSITIS OF RIGHT HIP: ICD-10-CM

## 2018-07-12 DIAGNOSIS — R29.898 WEAKNESS OF BOTH HIPS: ICD-10-CM

## 2018-07-12 DIAGNOSIS — M76.31 ILIOTIBIAL BAND SYNDROME OF RIGHT SIDE: ICD-10-CM

## 2018-07-12 PROCEDURE — 99213 OFFICE O/P EST LOW 20 MIN: CPT | Performed by: FAMILY MEDICINE

## 2018-07-12 NOTE — PROGRESS NOTES
Chief Complaint    Hip Pain (CK RIGHT HIP - REQUESTING CORTISONE INJECTION)    Follow-up worsening recurrent right greater than left lateral hip pain with hip weakness with known history of right greater than left hip osteoarthritis and episodic back pain without radiculopathy with recurrent lateral and posterior lateral right hip pain    History of Present Illness:  Miriam Zelaya is a 81 y.o. male who is a retired white male who is a former  at [employer] who also work for the [employer] who is a patient of Dr. Fran Diehl's who is being seen today for evaluation of ongoing bilateral right greater than left lateral hip pain with hip weakness and episodic back pain. 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 There is no history of injury or new activity prior to becoming symptomatic. He is complaining of an achy pain which she rates between a 3-6 out of 10 laterally involving the hips. He is also having some anterior hip flexor but occasional groin pain with overexertion. He does have discomfort with prolonged working standing yardwork and sitting on his tractor is close rolling over onto the lateral aspect of his hips at night. He does get some temporary relief with subtherapeutic dosings of ibuprofen and Advil. His pain symptoms are primarily achy in nature but will occasionally be sharp and has had episodic back pain without evidence of frequent radicular symptoms. He has not had formal therapy or imaging. He is seen today for orthopedic and sports consultation. He was last seen in the office on 7/18/2017 and was started on conservative treatment for his recurrent left hip osteoarthritis with IT band and trochanteric bursitis.   He is doing very well up until early his anti-inflammatories consistently. The majority of his lateral hip and gluteal discomfort has resolved is not complaining of radicular symptoms. No locking or catching. He is being seen today for reevaluation. He was last seen in the office on 2/15/2018 was continued on conservative treatments again. His recurrent left hip pain with IT band trochanter bursitis with underlying hip arthritis with chronic mechanical back pain. He presents back today stating that his hip symptoms have improved about 30% to 40% on the right. This left side is doing reasonably well. He is making progress with physical therapy with improvements in his flexibility. The majority of his residual pain is over the proximal IT band and to some degree over the right trochanteric bursa. He did have an ultrasound-guided steroid injection with Dr. Sergio Kulkarni on 2/28/2018 which is substantially improved his actual groin pain. He does feel as if he is getting stronger and definitely more flexible and actually crosses leg on the right today. No locking or catching. His lumbar soreness but is not truly complaining of radicular symptoms. He has been attending physical therapy formally. Denies neurogenic bowel or bladder symptoms. He did stop the diclofenac has been supplementing with ibuprofen 5-6 pills per day as this is typically been more effective for him but is not to the true anti-inflammatory dose. He was last seen in the office on 3/29/2018 and was continued on conservative treatment once again for his right greater than left hip trochanteric bursitis with IT band underlying hip posterior arthritis with mechanical back pain and lumbar degenerative disc disease without radiculopathy. Overall he states his left side is doing reasonably well but he has had some recurrence of his lateral right hip pain.   Since getting his ultrasound-guided right hip steroid injection with Dr. Sergio Kulkarni his actual groin pain has resolved and over the right trochanteric bursa. He rates as about a 3-4 out of 10. There is no left-sided tenderness. Moderate discomfort with palpation of the proximal IT band. No anterior hip flexor pain less gluteal pain. Rang of Motion:  He does have continued restrictions in his right hip motion with ongoing tightness to his IT bands and hamstrings bilaterally. He does have restriction in external rotation and internal rotation as well as hip adduction on the right hip. He does have restrictions in internal rotation but no overt pain with labral testing currently. Strength:  He does have symmetric 4-5 strength loss with resisted hip flexion and abduction. This not overly painful at this time. Special Tests:  Recurrent moderately positive Saul's testing on the right. .  Negative on the left. Negative straight leg raising. Negative Da's Valeria's testing. Skin: There are no rashes, ulcerations or lesions. Still motor sensory vascular exam appears intact. Gait: Mild antalgia. Reflex symmetrically preserved    Additional Comments:     Additional Examinations:    Right Lower Extremity: Examination of the right lower extremity does not show any tenderness, deformity or injury. Range of motion is unremarkable. There is no gross instability. There are no rashes, ulcerations or lesions. Strength and tone are normal.  Left Lower Extremity: Examination of the left lower extremity does not show any tenderness, deformity or injury. Range of motion is unremarkable. There is no gross instability. There are no rashes, ulcerations or lesions. Strength and tone are normal.  Lower Back: Examination of the lower back does reveal some mild lumbar paraspinal and lower facet discomfort. He does have reasonable flexion to about 50-60. Lateral bending rotation are diminished by about 10-20 %. Once again his straight leg raising appears to be fairly benign bilaterally. DTRs symmetrically preserved. exercise program was discussed. He does  his foot ankle brace for his left foot which hopefully will improve his gait as I think it is affecting his ongoing hip symptoms. I will see him back in 4 weeks for follow-up. Importance of following through with his exercise program was once again stressed. Ice and activity modification. Discussed. He will contact us the interim with questions or concerns. This dictation was performed with a verbal recognition program (DRAGON) and it was checked for errors. It is possible that there are still dictated errors within this office note. If so, please bring any errors to my attention for an addendum. All efforts were made to ensure that this office note is accurate.

## 2018-07-17 ENCOUNTER — HOSPITAL ENCOUNTER (OUTPATIENT)
Dept: PHYSICAL THERAPY | Age: 73
Discharge: HOME | End: 2018-07-31
Attending: FAMILY MEDICINE | Admitting: FAMILY MEDICINE

## 2018-07-17 NOTE — PLAN OF CARE
leg.    He did recently get fitted for an AFO today. He needs a fusion in his foot/ankle. He was placed in the AFO to try to prevent having surgery. His arches are falling. Relevant Medical History: prostate  Functional Disability Index: WOMAC    Pain Scale: 0/10  Easing factors: pain at best 0/10  Diclofanac, Dosepak (some confusion with this previously), ibf (currently with some relief) heat, injection under US guidance, PT  Provocative factors: 8-9/10 if lifting, turning the wrong way. Type: []Constant   [x]Intermittent  []Radiating []Localized []other:     Numbness/Tingling: in hands, but not in legs    Functional Limitations/Impairments: [x]Sitting [x]Standing [x]Walking    [x]Squatting/bending  [x]Stairs           [x]ADL's  [x]Transfers [x]Sports/Recreation []Other:    Occupation/School: retired, but he does watch his grandkids about 30-35 hr/wk     Living Status/Prior Level of Function: Independent with ADLs and IADLs      OBJECTIVE:     Neuro screen WFL      Joint mobility:    []Normal    [x]Hypo   []Hyper    Palpation: right ITB, greater trochanter    Functional Mobility/Transfers: see above    Posture: slight forward head    Bandages/Dressings/Incisions: NA    Gait: (include devices/WB status) antalgic. Decreased stance on right. Hip ABD weakness noted. Left foot ER to help with clearance.     +2 pitting edema over left shin      ROM PROM AROM Comments    Left Right Left Right    Flexion   90 88    Extension   13 10    Abduction   27 23    Adduction   20 14    ER   30 35    IR   22 20              Strength Left Right Comments   Hip flexors 4- 4-    Hip extension 4- 4    Hip abduction 3+ 3+    Hip adduction  Pain, unable to test    Hip ER 4+ 4+    Hip IR 4+ 4+    Quads 4+ 4+    Hamstrings 4+ 4+      Flexibility Left Right Comments   Hamstrings      ITB (Obers test) + +    Hip flexor(Garrison test)      gastroc      Rectus femoris(Elys test)              Orthopedic Special Tests:     Special  Test this is not the case this time around. The MD did stress the importance of compliance with HEP. Falls Risk Assessment (30 days):   [x] Falls Risk assessed and no intervention required. [] Falls Risk assessed and Patient requires intervention due to being higher risk   TUG score (>12s at risk):     [] Falls education provided, including       G-Codes:  PT G-Codes  Functional Assessment Tool Used: WOMAC  Score: 59.4%  Functional Limitation: Mobility: Walking and moving around  Mobility: Walking and Moving Around Current Status ():  At least 40 percent but less than 60 percent impaired, limited or restricted  Mobility: Walking and Moving Around Goal Status (): 0 percent impaired, limited or restricted    ASSESSMENT:   Functional Impairments:                [x]Noted lumbar/proximal hip/LE joint hypomobility              [x]Decreased LE functional ROM              [x]Decreased core/proximal hip strength and neuromuscular control              [x]Decreased LE functional strength   []Reduced balance/proprioceptive control              []other:       Functional Activity Limitations (from functional questionnaire and intake)              [x]Reduced ability to tolerate prolonged functional positions              [x]Reduced ability or difficulty with changes of positions or transfers between positions              []Reduced ability to maintain good posture and demonstrate good body mechanics with sitting, bending, and lifting              [x]Reduced ability to sleep              [x] Reduced ability or tolerance with driving and/or computer work              []Reduced ability to perform lifting, carrying tasks              [x]Reduced ability to squat              []Reduced ability to forward bend              [x]Reduced ability to ambulate prolonged functional periods/distances/surfaces              [x]Reduced ability to ascend/descend stairs              [x]Reduced ability to run, hop, cut or jump []other:     Participation Restrictions              [x]Reduced participation in self care activities              []Reduced participation in home management activities              []Reduced participation in work activities              [x]Reduced participation in social activities. []Reduced participation in sport/recreation activities. Classification :    []Signs/symptoms consistent with post-surgical status including decreased ROM, strength and function. []Signs/symptoms consistent with joint sprain/strain   []Signs/symptoms consistent with patella-femoral syndrome   []Signs/symptoms consistent with knee OA/hip OA   []Signs/symptoms consistent with internal derangement of knee/Hip   []Signs/symptoms consistent with functional hip weakness/NMR control      []Signs/symptoms consistent with tendinitis/tendinosis    []signs/symptoms consistent with pathology which may benefit from Dry needling      [x]other:  Pt is a 82 y/o male presenting with diagnosis of right hip OA, greater trochanteric bursitis, right ITB and bilateral hip weakness from the MD.  Clinically, the pt presents with decreased ROM, decreased strength, decreased function, and increased pain consistent with the MD diagnosis. The pt would benefit from skilled PT to return to PLOF. Pt has been seen at least 3 different episodes for this or a similar issue. With previous times, pt has been non-compliant and stopped PT on his own. Hopefully, this is not the case this time around. The MD did stress the importance of compliance with HEP. I did also stress the importance of this too. Pt was agreeable to coming to tx 2x/wk.       Prognosis/Rehab Potential:      []Excellent   [x]Good    [x]Fair   []Poor    Tolerance of evaluation/treatment:    []Excellent   [x]Good    []Fair   []Poor    PLAN  Frequency/Duration:  1-2 days per week for 6 Weeks:  Interventions:  [x]  Therapeutic exercise including: strength training, ROM, for Lower

## 2018-07-17 NOTE — FLOWSHEET NOTE
ES(attended) (89451)      [] ES (un) (12015):     GOALS:   Patient stated goal: not have pain and return to normal function. To strengthen hip and core.     Therapist goals for Patient:   Short Term Goals: To be achieved in: 2 weeks  1. Pt will be independent in HEP and progression per patient tolerance in order to prevent re-injury. 2. Patient will report pain at worst less than or equal to 4/10 to facilitate improvement in movement, function, and ADLs as indicated by functional deficits.     Long Term Goals: To be achieved in: 4-6 weeks  1. Pt will demo a score of 15% or less for the RUMA to assist with reaching prior level of function. 2. Patient will demonstrate increased AROM to hip IR greater than or equal to 30 to allow for proper joint functioning as indicated by patient's functional deficits. 3. Patient will demonstrate an increase in strength to core and hip ABD greater than or equal to 4 to 4+ to allow for proper functional mobility as indicated by patients's functional deficits. 4. Patient will return to report pain at worst less than or equal to 2/10.  5. Pt will perform all ADLs/IADLs without an increase in pain. Progression Towards Functional goals:  [] Patient is progressing as expected towards functional goals listed. [] Progression is slowed due to complexities listed. [] Progression has been slowed due to co-morbidities.   [x] Plan just implemented, too soon to assess goals progression  [] Other:     ASSESSMENT:  See eval    Treatment/Activity Tolerance:  [] Patient tolerated treatment well [] Patient limited by fatigue  [] Patient limited by pain  [] Patient limited by other medical complications  [x] Other:  Pt is a 80 y/o male presenting with diagnosis of right hip OA, greater trochanteric bursitis, right ITB and bilateral hip weakness from the MD.  Clinically, the pt presents with decreased ROM, decreased strength, decreased function, and increased pain consistent with the MD

## 2018-07-19 ENCOUNTER — HOSPITAL ENCOUNTER (OUTPATIENT)
Dept: PHYSICAL THERAPY | Age: 73
Discharge: HOME | End: 2018-07-20
Admitting: FAMILY MEDICINE

## 2018-07-19 NOTE — FLOWSHEET NOTE
mobility, lifting, ambulation and eccentric single leg control. NMR and Therapeutic Activities:    [x] (45006 or 98491) Provided verbal/tactile cueing for activities related to improving balance, coordination, kinesthetic sense, posture, motor skill, proprioception and motor activation to allow for proper function of core, proximal hip and LE with self care and ADLs  [] (60533) Gait Re-education- Provided training and instruction to the patient for proper LE, core and proximal hip recruitment and positioning and eccentric body weight control with ambulation re-education including up and down stairs     Home Exercise Program:    [x] (28557) Reviewed/Progressed HEP activities related to strengthening, flexibility, endurance, ROM of core, proximal hip and LE for functional self-care, mobility, lifting and ambulation/stair navigation   [] (59447)Reviewed/Progressed HEP activities related to improving balance, coordination, kinesthetic sense, posture, motor skill, proprioception of core, proximal hip and LE for self care, mobility, lifting, and ambulation/stair navigation      Manual Treatments:  PROM / STM / Oscillations-Mobs:  G-I, II, III, IV (PA's, Inf., Post.)  [] (04248) Provided manual therapy to mobilize LE, proximal hip and/or LS spine soft tissue/joints for the purpose of modulating pain, promoting relaxation,  increasing ROM, reducing/eliminating soft tissue swelling/inflammation/restriction, improving soft tissue extensibility and allowing for proper ROM for normal function with self care, mobility, lifting and ambulation.      Other:        Modalities:  declined    Charges:   Timed Code Treatment Minutes: 45'   Total Treatment Minutes: 72'     [] EVAL (LOW) [id number]   [] EVAL (MOD) 75380   [] EVAL (HIGH) 40885   [] RE-EVAL   [x] IS(50528) x  1   [] IONTO  [] NMR (55188) x      [] VASO  [x] Manual (75047) x  1    [] Other:   [x] TA x  1    [] Mech Traction (51305)  [] ES(attended) (19971)      [] ES (un)

## 2018-07-24 ENCOUNTER — HOSPITAL ENCOUNTER (OUTPATIENT)
Dept: PHYSICAL THERAPY | Age: 73
Discharge: HOME | End: 2018-07-25
Admitting: FAMILY MEDICINE

## 2018-07-24 NOTE — FLOWSHEET NOTE
Flexibility Left Right Comments   Hamstrings      ITB (Obers test)      Hip flexor Arnold Robes test)      gastroc      Rectus femoris (Elys test)              Special  Test Left Right Comments   FABERS      Scour test      Impingement test      Trendelenburg test              Strength Left Right Comments   Hip flexors      Hip extension      Hip abduction      Hip adduction      Hip ER      Hip IR      Quads      Hamstrings            RESTRICTIONS/PRECAUTIONS:     Exercises/Interventions: done bilaterally    Exercise/Equipment Repetitions Other comments   Stretching     Hamstring 3x:30 supine   Hip Flexion     ITB- Rope 3x:30 Cross body   Grion     Quad     Inclined Calf     Towel Pull     Piriformis     SKTC 10x:10              SLR     Supine 2x10    Prone     Abduction 3x10    Adducton     SLR+          Isometrics     Quad sets     Ball Squeezes 10x:10    Patellar Glides     Medial     Superior     Inferior          ROM     Passive     Active     Weight Shift     Hang Weights     Sheet Pulls     Ankle Pumps          CKC     Calf raises SL 3x10  Right only, then attempted left only   Wall sits     Step ups     1 leg stand 5x:15    Squatting     CC TKE     Balance     Monster Walks     Bridging 3x10    Triple threats     Step up 3x10 L1         Stool Scoots     PRE     Extension  RANGE:   Flexion  RANGE:        Cable Column          Leg Press  RANGE:        Bike 5'  With strap off left pedal   Treadmill          Manual tx Lateral hip distraction and STM with the Stick over ITB 10' -right                   Therapeutic Exercise and NMR EXR  [x] (13944) Provided verbal/tactile cueing for activities related to strengthening, flexibility, endurance, ROM for improvements in LE, proximal hip, and core control with self care, mobility, lifting, ambulation.  [] (64070) Provided verbal/tactile cueing for activities related to improving balance, coordination, kinesthetic sense, posture, motor skill, proprioception Poor    Patient Requires Follow-up: [x] Yes  [] No    PLAN: Consider step up at larger height.   [x] Continue per plan of care [] Alter current plan (see comments)  [] Plan of care initiated [] Hold pending MD visit [] Discharge    Electronically signed by: LISA Swann [id number]

## 2018-07-26 ENCOUNTER — HOSPITAL ENCOUNTER (OUTPATIENT)
Dept: PHYSICAL THERAPY | Age: 73
Discharge: HOME | End: 2018-07-27
Admitting: FAMILY MEDICINE

## 2018-07-26 NOTE — FLOWSHEET NOTE
care, mobility, lifting, ambulation and eccentric single leg control. NMR and Therapeutic Activities:    [x] (96560 or 93489) Provided verbal/tactile cueing for activities related to improving balance, coordination, kinesthetic sense, posture, motor skill, proprioception and motor activation to allow for proper function of core, proximal hip and LE with self care and ADLs  [] (67613) Gait Re-education- Provided training and instruction to the patient for proper LE, core and proximal hip recruitment and positioning and eccentric body weight control with ambulation re-education including up and down stairs     Home Exercise Program:    [x] (54332) Reviewed/Progressed HEP activities related to strengthening, flexibility, endurance, ROM of core, proximal hip and LE for functional self-care, mobility, lifting and ambulation/stair navigation   [] (42772)Reviewed/Progressed HEP activities related to improving balance, coordination, kinesthetic sense, posture, motor skill, proprioception of core, proximal hip and LE for self care, mobility, lifting, and ambulation/stair navigation      Manual Treatments:  PROM / STM / Oscillations-Mobs:  G-I, II, III, IV (PA's, Inf., Post.)  [] (08892) Provided manual therapy to mobilize LE, proximal hip and/or LS spine soft tissue/joints for the purpose of modulating pain, promoting relaxation,  increasing ROM, reducing/eliminating soft tissue swelling/inflammation/restriction, improving soft tissue extensibility and allowing for proper ROM for normal function with self care, mobility, lifting and ambulation.      Other:        Modalities:  declined    Charges:   Timed Code Treatment Minutes: 40'   Total Treatment Minutes: 61'     [] EVAL (LOW) 24491   [] EVAL (MOD) 46937   [] EVAL (HIGH) 47683   [] RE-EVAL   [x] GB(70220) x  1   [] IONTO  [] NMR (30854) x      [] VASO  [x] Manual (90196) x  1    [] Other:   [x] TA x  1    [] Mech Traction (09534)  [] ES(attended) (66975)      [] ES larger height.    [x] Continue per plan of care [] Alter current plan (see comments)  [] Plan of care initiated [] Hold pending MD visit [] Discharge    Electronically signed by: Everett Hwang DPT [id number]

## 2018-07-31 ENCOUNTER — HOSPITAL ENCOUNTER (OUTPATIENT)
Dept: PHYSICAL THERAPY | Age: 73
Discharge: HOME | End: 2018-08-31
Admitting: FAMILY MEDICINE

## 2018-07-31 NOTE — FLOWSHEET NOTE
ITB  Test measurements:    ROM PROM AROM Comments    Left Right Left Right    Flexion        Extension        Abduction        Adduction        ER        IR                  Flexibility Left Right Comments   Hamstrings      ITB (Obers test)      Hip flexor Thompson Eng test)      gastroc      Rectus femoris (Elys test)              Special  Test Left Right Comments   FABERS      Scour test      Impingement test      Trendelenburg test              Strength Left Right Comments   Hip flexors      Hip extension      Hip abduction      Hip adduction      Hip ER      Hip IR      Quads      Hamstrings            RESTRICTIONS/PRECAUTIONS:     Exercises/Interventions: done bilaterally    Exercise/Equipment Repetitions Other comments   Stretching     Hamstring 3x:30 supine   Hip Flexion     ITB- Rope 3x:30 Cross body   Grion     Quad     Inclined Calf     Towel Pull     Piriformis     SKTC 10x:10              SLR     Supine 3x10    Prone     Abduction 3x10    Adducton     SLR+          Isometrics     Quad sets     Ball Squeezes 10x:10    Patellar Glides     Medial     Superior     Inferior          ROM     Passive     Active     Weight Shift     Hang Weights     Sheet Pulls     Ankle Pumps          CKC     Calf raises SL 3x10  Right only, then attempted left only   Wall sits     Step ups     1 leg stand 5x:15    Squatting     CC TKE     Balance     Monster Walks     Bridging 3x5 SL    Triple threats     Step up 3x10 L1         Stool Scoots     PRE     Extension  RANGE:   Flexion  RANGE:        Cable Column          Leg Press  RANGE:        Bike 7'     Treadmill          Manual tx Lateral hip distraction and STM with the IASTM around  ITB 15' -right                   Therapeutic Exercise and NMR EXR  [x] (66166) Provided verbal/tactile cueing for activities related to strengthening, flexibility, endurance, ROM for improvements in LE, proximal hip, and core control with self care, mobility, lifting, ambulation.  [] (35497)

## 2018-08-01 ENCOUNTER — HOSPITAL ENCOUNTER (OUTPATIENT)
Dept: OTHER | Age: 73
Discharge: HOME | End: 2018-08-01
Attending: FAMILY MEDICINE | Admitting: FAMILY MEDICINE

## 2018-08-02 ENCOUNTER — HOSPITAL ENCOUNTER (OUTPATIENT)
Dept: PHYSICAL THERAPY | Age: 73
Discharge: HOME | End: 2018-08-03
Admitting: FAMILY MEDICINE

## 2018-08-06 ENCOUNTER — HOSPITAL ENCOUNTER (OUTPATIENT)
Dept: PHYSICAL THERAPY | Age: 73
Discharge: HOME | End: 2018-08-07
Admitting: FAMILY MEDICINE

## 2018-08-06 NOTE — FLOWSHEET NOTE
[address] [facility], [place]                                                         Physical Therapy Daily Treatment Note  Date:  2018    Patient Name:  Sarahi Álvarez    :  1945  MRN: [id number]    Medical/Treatment Diagnosis Information:  · Diagnosis: M16.11 (ICD-10-CM) - Primary osteoarthritis of right hip; M70.61 (ICD-10-CM) - Greater trochanteric bursitis of right hip; M76.31 (ICD-10-CM) - Iliotibial band syndrome of right side; R29.898 (ICD-10-CM) - Weakness of both hips. Onset 2018   · Treatment Diagnosis: R29.898 (ICD-10-CM) - Weakness of both hips; M25.551- pain in right hip  Insurance/Certification information:  PT Insurance Information: [employer]  Physician Information:  Referring Practitioner: Cleo Conklin signed (Y/N):     Date of Patient follow up with Physician: 9 Aug 18    G-Code (if applicable):      Date G-Code Applied:  2018    PT G-Codes  Functional Assessment Tool Used: WOMAC  Score: 59.4%  Functional Limitation: Mobility: Walking and moving around  Mobility: Walking and Moving Around Current Status (): At least 40 percent but less than 60 percent impaired, limited or restricted  Mobility: Walking and Moving Around Goal Status (): 0 percent impaired, limited or restricted    Progress Note: []  Yes  [x]  No  Next due by: Visit #10 or 17 Aug 2018       Latex Allergy:  [x]NO      []YES  Preferred Language for Healthcare:   [x]English       []other:    Visit # Insurance Allowable   12 Medical necessity     Pain level: 4-5/10    SUBJECTIVE:  It's painful walking. He has more pain walking. He has pain on his left leg going down the back of his leg, which is caused by seated position on firm seats. He doesn't have this with standing or moving around, but his right leg has more pain with standing. He also has problems with his left foot.   He's not sure if the pain is manual therapy: consisted on the placement of 4 needles in the following muscles:  gluteus medius, gluteus minimus, and vastus lateralis. A 100 mm needle was inserted, piston, rotated, and coned to produce intramuscular mobilization. These techniques were used to restore functional range of motion, reduce muscle spasm and induce healing in the corresponding musculature. (36498)  Clean Technique was utilized today while applying Dry needling treatment. The treatment sites where cleaned with 70% solution of  isopropyl alcohol . The PT washed their hands and utilized treatment gloves along with hand  prior to inserting the needles. All needles where removed and discarded in the appropriate sharps container. 10 min    Completed Luba Councilman, PT    Other:        Modalities:  declined    Charges:   Timed Code Treatment Minutes: 45'   Total Treatment Minutes: [de-identified]'     [] EVAL (LOW) 455 1011   [] EVAL (MOD) 14946   [] EVAL (HIGH) 74416   [] RE-EVAL   [x] OC(39614) x  1   [] IONTO  [] NMR (01191) x      [] VASO  [x] Manual (50441) x  1    [] Other:   [x] TA x  1    [] Mech Traction (92270)  [] ES(attended) (69729)      [] ES (un) (08608):     GOALS:   Patient stated goal: not have pain and return to normal function. To strengthen hip and core.     Therapist goals for Patient:   Short Term Goals: To be achieved in: 2 weeks  1. Pt will be independent in HEP and progression per patient tolerance in order to prevent re-injury. 2. Patient will report pain at worst less than or equal to 4/10 to facilitate improvement in movement, function, and ADLs as indicated by functional deficits.     Long Term Goals: To be achieved in: 4-6 weeks  1. Pt will demo a score of 15% or less for the RUMA to assist with reaching prior level of function. 2. Patient will demonstrate increased AROM to hip IR greater than or equal to 30 to allow for proper joint functioning as indicated by patient's functional deficits.    3. Patient

## 2018-08-08 ENCOUNTER — HOSPITAL ENCOUNTER (OUTPATIENT)
Dept: PHYSICAL THERAPY | Age: 73
Discharge: HOME | End: 2018-08-09
Admitting: FAMILY MEDICINE

## 2018-08-08 NOTE — FLOWSHEET NOTE
[address]    Physical Therapy Phone: [phone]    Fax: [phone]  _________________________________________________________________    Physical Therapy  Cancellation/No-show Note  Patient Name:  Deepak Ramirez  :  1945   Date:  2018  Cancelled visits to date: 1  No-shows to date: 0    For today's appointment patient:  [x]  Cancelled  []  Rescheduled appointment  []  No-show     Reason given by patient:  []  Patient ill  []  Conflicting appointment  []  No transportation    []  Conflict with work  []  No reason given  []  Other:     Comments:  Pt states he wants to cancel today's appointment as he is to see Dr. Manoj Barnhart tomorrow. He did stop in to talk today. He was very sore the day after his last tx session. He doesn't feel like it is from the needling as he was sore before we started needling. He would like another injection, but if that is not possible, he feels he would like to get a ROSALIE. He wants to wait until he sees the MD tomorrow. If the pt does not return, this note can suffice as his D/C note.        Electronically signed by:  Jaja Mckeon Quinaultrey

## 2018-08-09 ENCOUNTER — OFFICE VISIT (OUTPATIENT)
Dept: ORTHOPEDIC SURGERY | Age: 73
End: 2018-08-09

## 2018-08-09 VITALS
HEART RATE: 67 BPM | BODY MASS INDEX: 34.06 KG/M2 | HEIGHT: 67 IN | SYSTOLIC BLOOD PRESSURE: 118 MMHG | WEIGHT: 217 LBS | DIASTOLIC BLOOD PRESSURE: 66 MMHG

## 2018-08-09 DIAGNOSIS — M51.36 DDD (DEGENERATIVE DISC DISEASE), LUMBAR: ICD-10-CM

## 2018-08-09 DIAGNOSIS — M25.551 RIGHT HIP PAIN: ICD-10-CM

## 2018-08-09 DIAGNOSIS — M16.11 PRIMARY OSTEOARTHRITIS OF RIGHT HIP: Primary | ICD-10-CM

## 2018-08-09 DIAGNOSIS — R29.898 WEAKNESS OF RIGHT HIP: ICD-10-CM

## 2018-08-09 PROCEDURE — 99213 OFFICE O/P EST LOW 20 MIN: CPT | Performed by: FAMILY MEDICINE

## 2018-08-09 NOTE — PROGRESS NOTES
History  Patient's medications, allergies, past medical, surgical, social and family histories were reviewed and updated as appropriate. Review of Systems  Pertinent items are noted in HPI  Review of systems reviewed from Patient History Form dated on 7/18/2017 and available in the patient's chart under the Media tab. Vital Signs  Vitals:    08/09/18 0948   BP: 118/66   Pulse: 67       General Exam:     Constitutional: Patient is adequately groomed with no evidence of malnutrition  DTRs: Deep tendon reflexes are intact  Mental Status: The patient is oriented to time, place and person. The patient's mood and affect are appropriate. Lymphatic: The lymphatic examination bilaterally reveals all areas to be without enlargement or induration. Vascular: Examination reveals no swelling or calf tenderness. Peripheral pulses are palpable and 2+. Neurological: The patient has good coordination. There is no weakness or sensory deficit. Hip Examination    Inspection:  There is no high-grade deformity atrophy or soft tissue swelling involving the hips. Palpation:  He does have less prominent recurrent tenderness over the right trochanteric bursa. He rates as about a 2-3 out of 10. There is no left-sided tenderness. Moderate discomfort with palpation of the proximal IT band. No anterior hip flexor pain less gluteal pain. Rang of Motion:  He does have continued restrictions in his right hip motion with ongoing tightness to his IT bands and hamstrings bilaterally. He does have restriction in external rotation and internal rotation as well as hip adduction on the right hip. He does have restrictions in internal rotation and pain with log roll testing as well as pain with labral testing currently. Strength:  He does have symmetric 4-5 strength loss with resisted hip flexion and abduction. This not overly painful at this time. Special Tests:  Is effectively negative testing on the right. Marissa Arevalo

## 2018-08-22 NOTE — PROGRESS NOTES
[facility] [employer] / [facility]) [address] [facility], [address]    Acknowledgment of Informed Consent for Surgical or Medical Procedure and Sedation  I agree to allow doctor(s) Carlos Malik and his/her associates or assistants, including residents and/or other qualified medical practitioner to perform the following medical treatment or procedure and to administer or direct the administration of sedation as necessary:  Procedure(s): RIGHT TOTAL HIP ARTHROPLASTY  My doctor has explained the following regarding the proposed procedure:   the explanation of the procedure   the benefits of the procedure   the potential problems that might occur during recuperation   the risks and side effects of the procedure which could include but are not limited to severe blood loss, infection, stroke or death   the benefits, risks and side effect of alternative procedures including the consequences of declining this procedure or any alternative procedures   the likelihood of achieving satisfactory results. I acknowledge no guarantee or assurance has been made to me regarding the results. I understand that during the course of this treatment/procedure, unforeseen conditions can occur which require an additional or different procedure. I agree to allow my physician or assistants to perform such extension of the original procedure as they may find necessary. I understand that sedation will often result in temporary impairment of memory and fine motor skills and that sedation can occasionally progress to a state of deep sedation or general anesthesia. I understand the risks of anesthesia for surgery include, but are not limited to, sore throat, hoarseness, injury to face, mouth, or teeth; nausea; headache; injury to blood vessels or nerves; death, brain damage, or paralysis.     I understand that if I have a Limitation of Treatment order in effect during my hospitalization, the order may or may not be in effect during this procedure. I give my doctor permission to give me blood or blood products. I understand that there are risks with receiving blood such as hepatitis, AIDS, fever, or allergic reaction. I acknowledge that the risks, benefits, and alternatives of this treatment have been explained to me and that no express or implied warranty has been given by the hospital, any blood bank, or any person or entity as to the blood or blood components transfused. At the discretion of my doctor, I agree to allow observers, equipment/product representatives and allow photographing, and/or televising of the procedure, provided my name or identity is maintained confidentially. I agree the hospital may dispose of or use for scientific or educational purposes any tissue, fluid, or body parts which may be removed.     ________________________________Date________Time______ am/pm  ([place] [facility])  Patient or Signature of Closest Relative or Legal Guardian    ________________________________Date________Time______am/pm      Page 1 of  1  Witness

## 2018-08-23 ENCOUNTER — HOSPITAL ENCOUNTER (OUTPATIENT)
Dept: CT IMAGING | Age: 73
Discharge: HOME | End: 2018-08-23
Payer: MEDICARE

## 2018-08-23 ENCOUNTER — HOSPITAL ENCOUNTER (OUTPATIENT)
Dept: MRI IMAGING | Age: 73
Discharge: HOME | End: 2018-08-23
Payer: MEDICARE

## 2018-08-23 DIAGNOSIS — M87.051 AVASCULAR NECROSIS OF HIP, RIGHT (HCC): ICD-10-CM

## 2018-08-23 DIAGNOSIS — M87.051 IDIOPATHIC ASEPTIC NECROSIS OF RIGHT FEMUR (HCC): ICD-10-CM

## 2018-08-23 DIAGNOSIS — M16.11 ARTHRITIS OF RIGHT HIP: ICD-10-CM

## 2018-08-23 PROCEDURE — 73700 CT LOWER EXTREMITY W/O DYE: CPT

## 2018-08-23 PROCEDURE — 72195 MRI PELVIS W/O DYE: CPT

## 2018-08-28 ENCOUNTER — HOSPITAL ENCOUNTER (OUTPATIENT)
Dept: PREADMISSION TESTING | Age: 73
Discharge: HOME | End: 2018-09-01
Payer: MEDICARE

## 2018-08-28 VITALS
DIASTOLIC BLOOD PRESSURE: 79 MMHG | BODY MASS INDEX: 33.74 KG/M2 | OXYGEN SATURATION: 98 % | HEART RATE: 60 BPM | HEIGHT: 67 IN | TEMPERATURE: 98.1 F | SYSTOLIC BLOOD PRESSURE: 146 MMHG | WEIGHT: 215 LBS | RESPIRATION RATE: 14 BRPM

## 2018-08-28 LAB
ABO/RH: NORMAL
ANION GAP SERPL CALCULATED.3IONS-SCNC: 11 MMOL/L (ref 3–16)
ANTIBODY SCREEN: NORMAL
APTT: 32.7 SEC (ref 26–36)
BACTERIA: ABNORMAL /HPF
BILIRUBIN URINE: NEGATIVE
BLOOD, URINE: ABNORMAL
BUN BLDV-MCNC: 26 MG/DL (ref 7–20)
CALCIUM SERPL-MCNC: 9.3 MG/DL (ref 8.3–10.6)
CHLORIDE BLD-SCNC: 103 MMOL/L (ref 99–110)
CLARITY: CLEAR
CO2: 25 MMOL/L (ref 21–32)
COLOR: YELLOW
CREAT SERPL-MCNC: 0.8 MG/DL (ref 0.8–1.3)
EKG ATRIAL RATE: 54 BPM
EKG DIAGNOSIS: NORMAL
EKG P AXIS: 43 DEGREES
EKG P-R INTERVAL: 176 MS
EKG Q-T INTERVAL: 420 MS
EKG QRS DURATION: 116 MS
EKG QTC CALCULATION (BAZETT): 398 MS
EKG R AXIS: 22 DEGREES
EKG T AXIS: 51 DEGREES
EKG VENTRICULAR RATE: 54 BPM
GFR AFRICAN AMERICAN: >60
GFR NON-AFRICAN AMERICAN: >60
GLUCOSE BLD-MCNC: 102 MG/DL (ref 70–99)
GLUCOSE URINE: NEGATIVE MG/DL
HCT VFR BLD CALC: 38.2 % (ref 40.5–52.5)
HEMOGLOBIN: 12.9 G/DL (ref 13.5–17.5)
INR BLD: 0.93 (ref 0.86–1.14)
KETONES, URINE: NEGATIVE MG/DL
LEUKOCYTE ESTERASE, URINE: NEGATIVE
MCH RBC QN AUTO: 31.6 PG (ref 26–34)
MCHC RBC AUTO-ENTMCNC: 33.8 G/DL (ref 31–36)
MCV RBC AUTO: 93.7 FL (ref 80–100)
MICROSCOPIC EXAMINATION: YES
NITRITE, URINE: NEGATIVE
PDW BLD-RTO: 13.7 % (ref 12.4–15.4)
PH UA: 6
PLATELET # BLD: 358 K/UL (ref 135–450)
PMV BLD AUTO: 7.6 FL (ref 5–10.5)
POTASSIUM SERPL-SCNC: 4.3 MMOL/L (ref 3.5–5.1)
PREALBUMIN: 31 MG/DL (ref 20–40)
PROTEIN UA: NEGATIVE MG/DL
PROTHROMBIN TIME: 10.6 SEC (ref 9.8–13)
RBC # BLD: 4.08 M/UL (ref 4.2–5.9)
RBC UA: ABNORMAL /HPF (ref 0–2)
SODIUM BLD-SCNC: 139 MMOL/L (ref 136–145)
SPECIFIC GRAVITY UA: 1.02
URINE TYPE: ABNORMAL
UROBILINOGEN, URINE: 0.2 E.U./DL
WBC # BLD: 6.8 K/UL (ref 4–11)
WBC UA: ABNORMAL /HPF (ref 0–5)

## 2018-08-28 PROCEDURE — 81001 URINALYSIS AUTO W/SCOPE: CPT

## 2018-08-28 PROCEDURE — 93005 ELECTROCARDIOGRAM TRACING: CPT | Performed by: ORTHOPAEDIC SURGERY

## 2018-08-28 PROCEDURE — 85730 THROMBOPLASTIN TIME PARTIAL: CPT

## 2018-08-28 PROCEDURE — 80048 BASIC METABOLIC PNL TOTAL CA: CPT

## 2018-08-28 PROCEDURE — 86850 RBC ANTIBODY SCREEN: CPT

## 2018-08-28 PROCEDURE — 85027 COMPLETE CBC AUTOMATED: CPT

## 2018-08-28 PROCEDURE — 86901 BLOOD TYPING SEROLOGIC RH(D): CPT

## 2018-08-28 PROCEDURE — 93010 ELECTROCARDIOGRAM REPORT: CPT | Performed by: INTERNAL MEDICINE

## 2018-08-28 PROCEDURE — 84134 ASSAY OF PREALBUMIN: CPT

## 2018-08-28 PROCEDURE — 86900 BLOOD TYPING SEROLOGIC ABO: CPT

## 2018-08-28 PROCEDURE — 87086 URINE CULTURE/COLONY COUNT: CPT

## 2018-08-28 PROCEDURE — 87641 MR-STAPH DNA AMP PROBE: CPT

## 2018-08-28 PROCEDURE — 85610 PROTHROMBIN TIME: CPT

## 2018-08-28 RX ORDER — TADALAFIL 5 MG/1
5 TABLET ORAL DAILY
COMMUNITY

## 2018-08-28 NOTE — PROGRESS NOTES
Snoring? Do you snore loudly (loud enough to be heard through closed doors, or your bed partner elbows you for snoring at night)? Yes    Tired? Do you often feel tired, fatigued, or sleepy during the daytime (such as falling asleep during driving)? No    Observed? Has anyone observed you stop breathing or choking/gasping during your sleep? No    Pressure? Do you have or are being treated for high blood pressure? No    Neck Size? (measured around Michaels apple)  For male, is your shirt collar 17 inches or larger? For female, is your shirt collar 16 inches or larger? No    Age older than 48years old? Yes    Gender = Male  Yes    Body Mass Index more than 35 kg/m2?   No    Risk of BARBARA Scoring criteria:    [] Low risk:  Yes to 0  2 questions    [x] Intermediate risk:  Yes to 3  4 questions    [] High risk:  Yes to 5  8 questions

## 2018-08-29 LAB
MRSA SCREEN RT-PCR: NORMAL
URINE CULTURE, ROUTINE: NORMAL

## 2018-09-01 ENCOUNTER — HOSPITAL ENCOUNTER (OUTPATIENT)
Dept: OTHER | Age: 73
Discharge: HOME | End: 2018-09-01
Attending: FAMILY MEDICINE | Admitting: FAMILY MEDICINE

## 2018-09-05 ENCOUNTER — ANESTHESIA EVENT (OUTPATIENT)
Dept: OPERATING ROOM | Age: 73
DRG: 470 | End: 2018-09-05
Payer: MEDICARE

## 2018-09-06 ENCOUNTER — APPOINTMENT (OUTPATIENT)
Dept: GENERAL RADIOLOGY | Age: 73
DRG: 470 | End: 2018-09-06
Attending: ORTHOPAEDIC SURGERY
Payer: MEDICARE

## 2018-09-06 ENCOUNTER — HOSPITAL ENCOUNTER (INPATIENT)
Age: 73
LOS: 4 days | Discharge: NURSING, REHAB OR LONG-TERM CARE | DRG: 470 | End: 2018-09-10
Attending: ORTHOPAEDIC SURGERY | Admitting: ORTHOPAEDIC SURGERY
Payer: MEDICARE

## 2018-09-06 ENCOUNTER — ANESTHESIA (OUTPATIENT)
Dept: OPERATING ROOM | Age: 73
DRG: 470 | End: 2018-09-06
Payer: MEDICARE

## 2018-09-06 VITALS — DIASTOLIC BLOOD PRESSURE: 54 MMHG | OXYGEN SATURATION: 98 % | TEMPERATURE: 97 F | SYSTOLIC BLOOD PRESSURE: 98 MMHG

## 2018-09-06 DIAGNOSIS — M16.11 PRIMARY OSTEOARTHRITIS OF RIGHT HIP: ICD-10-CM

## 2018-09-06 DIAGNOSIS — M16.0 PRIMARY OSTEOARTHRITIS OF BOTH HIPS: Primary | ICD-10-CM

## 2018-09-06 DIAGNOSIS — M87.9 OSTEONECROSIS OF RIGHT HIP (HCC): ICD-10-CM

## 2018-09-06 DIAGNOSIS — M16.11 OSTEOARTHRITIS OF RIGHT HIP, UNSPECIFIED OSTEOARTHRITIS TYPE: ICD-10-CM

## 2018-09-06 LAB
ABO/RH: NORMAL
ANTIBODY SCREEN: NORMAL
GLUCOSE BLD-MCNC: 100 MG/DL (ref 70–99)
PERFORMED ON: ABNORMAL

## 2018-09-06 PROCEDURE — C1713 ANCHOR/SCREW BN/BN,TIS/BN: HCPCS | Performed by: ORTHOPAEDIC SURGERY

## 2018-09-06 PROCEDURE — 2709999900 HC NON-CHARGEABLE SUPPLY: Performed by: ORTHOPAEDIC SURGERY

## 2018-09-06 PROCEDURE — 6370000000 HC RX 637 (ALT 250 FOR IP): Performed by: ORTHOPAEDIC SURGERY

## 2018-09-06 PROCEDURE — 86900 BLOOD TYPING SEROLOGIC ABO: CPT

## 2018-09-06 PROCEDURE — 0SR902A REPLACEMENT OF RIGHT HIP JOINT WITH METAL ON POLYETHYLENE SYNTHETIC SUBSTITUTE, UNCEMENTED, OPEN APPROACH: ICD-10-PCS | Performed by: ORTHOPAEDIC SURGERY

## 2018-09-06 PROCEDURE — C1776 JOINT DEVICE (IMPLANTABLE): HCPCS | Performed by: ORTHOPAEDIC SURGERY

## 2018-09-06 PROCEDURE — 86901 BLOOD TYPING SEROLOGIC RH(D): CPT

## 2018-09-06 PROCEDURE — 7100000001 HC PACU RECOVERY - ADDTL 15 MIN: Performed by: ORTHOPAEDIC SURGERY

## 2018-09-06 PROCEDURE — 76942 ECHO GUIDE FOR BIOPSY: CPT | Performed by: ANESTHESIOLOGY

## 2018-09-06 PROCEDURE — 3600000009 HC SURGERY ROBOT BASE: Performed by: ORTHOPAEDIC SURGERY

## 2018-09-06 PROCEDURE — 94664 DEMO&/EVAL PT USE INHALER: CPT

## 2018-09-06 PROCEDURE — 2500000003 HC RX 250 WO HCPCS: Performed by: NURSE ANESTHETIST, CERTIFIED REGISTERED

## 2018-09-06 PROCEDURE — 94761 N-INVAS EAR/PLS OXIMETRY MLT: CPT

## 2018-09-06 PROCEDURE — 6360000002 HC RX W HCPCS: Performed by: ORTHOPAEDIC SURGERY

## 2018-09-06 PROCEDURE — 72170 X-RAY EXAM OF PELVIS: CPT

## 2018-09-06 PROCEDURE — 8E0Y0CZ ROBOTIC ASSISTED PROCEDURE OF LOWER EXTREMITY, OPEN APPROACH: ICD-10-PCS | Performed by: ORTHOPAEDIC SURGERY

## 2018-09-06 PROCEDURE — 2580000003 HC RX 258: Performed by: NURSE ANESTHETIST, CERTIFIED REGISTERED

## 2018-09-06 PROCEDURE — 6360000002 HC RX W HCPCS: Performed by: ANESTHESIOLOGY

## 2018-09-06 PROCEDURE — 2580000003 HC RX 258: Performed by: ORTHOPAEDIC SURGERY

## 2018-09-06 PROCEDURE — 6360000002 HC RX W HCPCS: Performed by: NURSE ANESTHETIST, CERTIFIED REGISTERED

## 2018-09-06 PROCEDURE — 2500000003 HC RX 250 WO HCPCS: Performed by: ORTHOPAEDIC SURGERY

## 2018-09-06 PROCEDURE — 94150 VITAL CAPACITY TEST: CPT

## 2018-09-06 PROCEDURE — 2720000010 HC SURG SUPPLY STERILE: Performed by: ORTHOPAEDIC SURGERY

## 2018-09-06 PROCEDURE — 2580000003 HC RX 258: Performed by: ANESTHESIOLOGY

## 2018-09-06 PROCEDURE — 2720000001 HC MISC SURG SUPPLY STERILE $51-500: Performed by: ORTHOPAEDIC SURGERY

## 2018-09-06 PROCEDURE — 73501 X-RAY EXAM HIP UNI 1 VIEW: CPT

## 2018-09-06 PROCEDURE — 7100000000 HC PACU RECOVERY - FIRST 15 MIN: Performed by: ORTHOPAEDIC SURGERY

## 2018-09-06 PROCEDURE — S2900 ROBOTIC SURGICAL SYSTEM: HCPCS | Performed by: ORTHOPAEDIC SURGERY

## 2018-09-06 PROCEDURE — 1200000000 HC SEMI PRIVATE

## 2018-09-06 PROCEDURE — 3700000000 HC ANESTHESIA ATTENDED CARE: Performed by: ORTHOPAEDIC SURGERY

## 2018-09-06 PROCEDURE — 3700000001 HC ADD 15 MINUTES (ANESTHESIA): Performed by: ORTHOPAEDIC SURGERY

## 2018-09-06 PROCEDURE — P9041 ALBUMIN (HUMAN),5%, 50ML: HCPCS | Performed by: NURSE ANESTHETIST, CERTIFIED REGISTERED

## 2018-09-06 PROCEDURE — 3600000019 HC SURGERY ROBOT ADDTL 15MIN: Performed by: ORTHOPAEDIC SURGERY

## 2018-09-06 PROCEDURE — 86850 RBC ANTIBODY SCREEN: CPT

## 2018-09-06 DEVICE — Z DUP USE 2377968 SCREW ACET HEX LOW PROFILE 6.5X25MM TRIDENT II: Type: IMPLANTABLE DEVICE | Site: HIP | Status: FUNCTIONAL

## 2018-09-06 DEVICE — IMPLANTABLE DEVICE: Type: IMPLANTABLE DEVICE | Site: HIP | Status: FUNCTIONAL

## 2018-09-06 DEVICE — LINER ACET DIA40MM THK5.8MM 0DEG X3 LOK PRSS FIT PRI REV: Type: IMPLANTABLE DEVICE | Site: HIP | Status: FUNCTIONAL

## 2018-09-06 DEVICE — Z DUP USE 2377898 TRIDENT II TRITANIUM CLUSTERHOLE 58F: Type: IMPLANTABLE DEVICE | Site: HIP | Status: FUNCTIONAL

## 2018-09-06 DEVICE — SCREW BNE L30MM DIA6.5MM STD CANC HIP TI HMSPHR THRD DRVR: Type: IMPLANTABLE DEVICE | Site: HIP | Status: FUNCTIONAL

## 2018-09-06 DEVICE — COMPONENT TOT HIP CAPPED ADV STRYHIPA] STRYKER CORP]: Type: IMPLANTABLE DEVICE | Site: HIP | Status: FUNCTIONAL

## 2018-09-06 RX ORDER — PROMETHAZINE HYDROCHLORIDE 25 MG/ML
6.25 INJECTION, SOLUTION INTRAMUSCULAR; INTRAVENOUS
Status: DISCONTINUED | OUTPATIENT
Start: 2018-09-06 | End: 2018-09-06 | Stop reason: HOSPADM

## 2018-09-06 RX ORDER — DIPHENHYDRAMINE HYDROCHLORIDE 50 MG/ML
12.5 INJECTION INTRAMUSCULAR; INTRAVENOUS
Status: DISCONTINUED | OUTPATIENT
Start: 2018-09-06 | End: 2018-09-06 | Stop reason: HOSPADM

## 2018-09-06 RX ORDER — MIDAZOLAM HYDROCHLORIDE 1 MG/ML
INJECTION INTRAMUSCULAR; INTRAVENOUS
Status: DISCONTINUED
Start: 2018-09-06 | End: 2018-09-06

## 2018-09-06 RX ORDER — HYDRALAZINE HYDROCHLORIDE 20 MG/ML
5 INJECTION INTRAMUSCULAR; INTRAVENOUS EVERY 10 MIN PRN
Status: DISCONTINUED | OUTPATIENT
Start: 2018-09-06 | End: 2018-09-06 | Stop reason: HOSPADM

## 2018-09-06 RX ORDER — ROPIVACAINE HYDROCHLORIDE 5 MG/ML
INJECTION, SOLUTION EPIDURAL; INFILTRATION; PERINEURAL
Status: DISCONTINUED
Start: 2018-09-06 | End: 2018-09-06

## 2018-09-06 RX ORDER — BISACODYL 10 MG
10 SUPPOSITORY, RECTAL RECTAL DAILY PRN
Status: DISCONTINUED | OUTPATIENT
Start: 2018-09-06 | End: 2018-09-10 | Stop reason: HOSPADM

## 2018-09-06 RX ORDER — SODIUM CHLORIDE 0.9 % (FLUSH) 0.9 %
10 SYRINGE (ML) INJECTION PRN
Status: DISCONTINUED | OUTPATIENT
Start: 2018-09-06 | End: 2018-09-10 | Stop reason: HOSPADM

## 2018-09-06 RX ORDER — TRANEXAMIC ACID 650 1/1
1300 TABLET ORAL EVERY 8 HOURS
Status: COMPLETED | OUTPATIENT
Start: 2018-09-06 | End: 2018-09-06

## 2018-09-06 RX ORDER — LIDOCAINE HYDROCHLORIDE 20 MG/ML
INJECTION, SOLUTION INFILTRATION; PERINEURAL PRN
Status: DISCONTINUED | OUTPATIENT
Start: 2018-09-06 | End: 2018-09-06 | Stop reason: SDUPTHER

## 2018-09-06 RX ORDER — ONDANSETRON 2 MG/ML
4 INJECTION INTRAMUSCULAR; INTRAVENOUS EVERY 6 HOURS PRN
Status: DISCONTINUED | OUTPATIENT
Start: 2018-09-06 | End: 2018-09-10 | Stop reason: HOSPADM

## 2018-09-06 RX ORDER — ALBUMIN, HUMAN INJ 5% 5 %
SOLUTION INTRAVENOUS PRN
Status: DISCONTINUED | OUTPATIENT
Start: 2018-09-06 | End: 2018-09-06 | Stop reason: SDUPTHER

## 2018-09-06 RX ORDER — AMOXICILLIN 250 MG
CAPSULE ORAL
Qty: 40 TABLET | Refills: 1 | Status: SHIPPED | OUTPATIENT
Start: 2018-09-06 | End: 2022-05-31

## 2018-09-06 RX ORDER — TAMSULOSIN HYDROCHLORIDE 0.4 MG/1
0.4 CAPSULE ORAL DAILY
Status: DISCONTINUED | OUTPATIENT
Start: 2018-09-06 | End: 2018-09-07

## 2018-09-06 RX ORDER — PROPOFOL 10 MG/ML
INJECTION, EMULSION INTRAVENOUS PRN
Status: DISCONTINUED | OUTPATIENT
Start: 2018-09-06 | End: 2018-09-06 | Stop reason: SDUPTHER

## 2018-09-06 RX ORDER — OXYCODONE AND ACETAMINOPHEN 10; 325 MG/1; MG/1
1 TABLET ORAL EVERY 4 HOURS PRN
Qty: 42 TABLET | Refills: 0 | Status: SHIPPED | OUTPATIENT
Start: 2018-09-06 | End: 2018-09-13

## 2018-09-06 RX ORDER — DEXAMETHASONE SODIUM PHOSPHATE 4 MG/ML
10 INJECTION, SOLUTION INTRA-ARTICULAR; INTRALESIONAL; INTRAMUSCULAR; INTRAVENOUS; SOFT TISSUE EVERY 8 HOURS
Status: COMPLETED | OUTPATIENT
Start: 2018-09-06 | End: 2018-09-06

## 2018-09-06 RX ORDER — LEVOTHYROXINE SODIUM 0.05 MG/1
50 TABLET ORAL DAILY
Status: DISCONTINUED | OUTPATIENT
Start: 2018-09-06 | End: 2018-09-10 | Stop reason: HOSPADM

## 2018-09-06 RX ORDER — EPHEDRINE SULFATE 50 MG/ML
INJECTION INTRAVENOUS PRN
Status: DISCONTINUED | OUTPATIENT
Start: 2018-09-06 | End: 2018-09-06 | Stop reason: SDUPTHER

## 2018-09-06 RX ORDER — OXYCODONE HYDROCHLORIDE 5 MG/1
5 TABLET ORAL PRN
Status: DISCONTINUED | OUTPATIENT
Start: 2018-09-06 | End: 2018-09-06 | Stop reason: HOSPADM

## 2018-09-06 RX ORDER — DEXAMETHASONE SODIUM PHOSPHATE 4 MG/ML
INJECTION, SOLUTION INTRA-ARTICULAR; INTRALESIONAL; INTRAMUSCULAR; INTRAVENOUS; SOFT TISSUE PRN
Status: DISCONTINUED | OUTPATIENT
Start: 2018-09-06 | End: 2018-09-06 | Stop reason: SDUPTHER

## 2018-09-06 RX ORDER — LABETALOL HYDROCHLORIDE 5 MG/ML
5 INJECTION, SOLUTION INTRAVENOUS EVERY 10 MIN PRN
Status: DISCONTINUED | OUTPATIENT
Start: 2018-09-06 | End: 2018-09-06 | Stop reason: HOSPADM

## 2018-09-06 RX ORDER — OXYCODONE HYDROCHLORIDE 5 MG/1
5 TABLET ORAL EVERY 4 HOURS PRN
Status: DISCONTINUED | OUTPATIENT
Start: 2018-09-06 | End: 2018-09-10 | Stop reason: HOSPADM

## 2018-09-06 RX ORDER — OXYCODONE HYDROCHLORIDE AND ACETAMINOPHEN 5; 325 MG/1; MG/1
2 TABLET ORAL EVERY 6 HOURS SCHEDULED
Status: DISCONTINUED | OUTPATIENT
Start: 2018-09-06 | End: 2018-09-07

## 2018-09-06 RX ORDER — TADALAFIL 5 MG/1
5 TABLET ORAL DAILY
Status: DISCONTINUED | OUTPATIENT
Start: 2018-09-06 | End: 2018-09-06

## 2018-09-06 RX ORDER — OXYCODONE HYDROCHLORIDE 5 MG/1
10 TABLET ORAL EVERY 4 HOURS PRN
Status: DISCONTINUED | OUTPATIENT
Start: 2018-09-06 | End: 2018-09-07

## 2018-09-06 RX ORDER — MORPHINE SULFATE 2 MG/ML
1 INJECTION, SOLUTION INTRAMUSCULAR; INTRAVENOUS EVERY 5 MIN PRN
Status: DISCONTINUED | OUTPATIENT
Start: 2018-09-06 | End: 2018-09-06 | Stop reason: HOSPADM

## 2018-09-06 RX ORDER — SODIUM CHLORIDE, SODIUM LACTATE, POTASSIUM CHLORIDE, CALCIUM CHLORIDE 600; 310; 30; 20 MG/100ML; MG/100ML; MG/100ML; MG/100ML
INJECTION, SOLUTION INTRAVENOUS CONTINUOUS PRN
Status: DISCONTINUED | OUTPATIENT
Start: 2018-09-06 | End: 2018-09-06 | Stop reason: SDUPTHER

## 2018-09-06 RX ORDER — CELECOXIB 100 MG/1
200 CAPSULE ORAL 2 TIMES DAILY
Status: DISCONTINUED | OUTPATIENT
Start: 2018-09-06 | End: 2018-09-10 | Stop reason: HOSPADM

## 2018-09-06 RX ORDER — HYDROMORPHONE HCL 110MG/55ML
PATIENT CONTROLLED ANALGESIA SYRINGE INTRAVENOUS PRN
Status: DISCONTINUED | OUTPATIENT
Start: 2018-09-06 | End: 2018-09-06 | Stop reason: SDUPTHER

## 2018-09-06 RX ORDER — ACETAMINOPHEN 325 MG/1
650 TABLET ORAL EVERY 4 HOURS PRN
Status: DISCONTINUED | OUTPATIENT
Start: 2018-09-06 | End: 2018-09-10 | Stop reason: HOSPADM

## 2018-09-06 RX ORDER — MAGNESIUM HYDROXIDE 1200 MG/15ML
LIQUID ORAL CONTINUOUS PRN
Status: COMPLETED | OUTPATIENT
Start: 2018-09-06 | End: 2018-09-06

## 2018-09-06 RX ORDER — MEPERIDINE HYDROCHLORIDE 25 MG/ML
12.5 INJECTION INTRAMUSCULAR; INTRAVENOUS; SUBCUTANEOUS EVERY 5 MIN PRN
Status: DISCONTINUED | OUTPATIENT
Start: 2018-09-06 | End: 2018-09-06 | Stop reason: HOSPADM

## 2018-09-06 RX ORDER — DEXTROSE, SODIUM CHLORIDE, AND POTASSIUM CHLORIDE 5; .45; .15 G/100ML; G/100ML; G/100ML
1000 INJECTION INTRAVENOUS CONTINUOUS
Status: DISCONTINUED | OUTPATIENT
Start: 2018-09-06 | End: 2018-09-10 | Stop reason: HOSPADM

## 2018-09-06 RX ORDER — METOCLOPRAMIDE HYDROCHLORIDE 5 MG/ML
10 INJECTION INTRAMUSCULAR; INTRAVENOUS
Status: DISCONTINUED | OUTPATIENT
Start: 2018-09-06 | End: 2018-09-06 | Stop reason: HOSPADM

## 2018-09-06 RX ORDER — DOCUSATE SODIUM 100 MG/1
100 CAPSULE, LIQUID FILLED ORAL 2 TIMES DAILY
Status: DISCONTINUED | OUTPATIENT
Start: 2018-09-06 | End: 2018-09-10 | Stop reason: HOSPADM

## 2018-09-06 RX ORDER — FENTANYL CITRATE 50 UG/ML
INJECTION, SOLUTION INTRAMUSCULAR; INTRAVENOUS
Status: DISCONTINUED
Start: 2018-09-06 | End: 2018-09-06

## 2018-09-06 RX ORDER — OXYCODONE HYDROCHLORIDE 5 MG/1
10 TABLET ORAL PRN
Status: DISCONTINUED | OUTPATIENT
Start: 2018-09-06 | End: 2018-09-06 | Stop reason: HOSPADM

## 2018-09-06 RX ORDER — ASPIRIN 81 MG/1
TABLET ORAL
Qty: 100 TABLET | Refills: 0 | Status: SHIPPED | OUTPATIENT
Start: 2018-09-06

## 2018-09-06 RX ORDER — SODIUM CHLORIDE, SODIUM LACTATE, POTASSIUM CHLORIDE, CALCIUM CHLORIDE 600; 310; 30; 20 MG/100ML; MG/100ML; MG/100ML; MG/100ML
INJECTION, SOLUTION INTRAVENOUS CONTINUOUS
Status: DISCONTINUED | OUTPATIENT
Start: 2018-09-06 | End: 2018-09-06

## 2018-09-06 RX ORDER — ONDANSETRON 2 MG/ML
INJECTION INTRAMUSCULAR; INTRAVENOUS PRN
Status: DISCONTINUED | OUTPATIENT
Start: 2018-09-06 | End: 2018-09-06 | Stop reason: SDUPTHER

## 2018-09-06 RX ORDER — ROCURONIUM BROMIDE 10 MG/ML
INJECTION, SOLUTION INTRAVENOUS PRN
Status: DISCONTINUED | OUTPATIENT
Start: 2018-09-06 | End: 2018-09-06 | Stop reason: SDUPTHER

## 2018-09-06 RX ORDER — SODIUM CHLORIDE 0.9 % (FLUSH) 0.9 %
10 SYRINGE (ML) INJECTION EVERY 12 HOURS SCHEDULED
Status: DISCONTINUED | OUTPATIENT
Start: 2018-09-06 | End: 2018-09-10 | Stop reason: HOSPADM

## 2018-09-06 RX ADMIN — ROCURONIUM BROMIDE 30 MG: 10 INJECTION, SOLUTION INTRAVENOUS at 09:05

## 2018-09-06 RX ADMIN — OXYCODONE HYDROCHLORIDE AND ACETAMINOPHEN 2 TABLET: 5; 325 TABLET ORAL at 17:59

## 2018-09-06 RX ADMIN — HYDROMORPHONE HYDROCHLORIDE 0.25 MG: 1 INJECTION, SOLUTION INTRAMUSCULAR; INTRAVENOUS; SUBCUTANEOUS at 13:25

## 2018-09-06 RX ADMIN — ALBUMIN (HUMAN) 250 ML: 12.5 INJECTION, SOLUTION INTRAVENOUS at 10:42

## 2018-09-06 RX ADMIN — TAMSULOSIN HYDROCHLORIDE 0.4 MG: 0.4 CAPSULE ORAL at 16:33

## 2018-09-06 RX ADMIN — ROCURONIUM BROMIDE 25 MG: 10 INJECTION, SOLUTION INTRAVENOUS at 10:57

## 2018-09-06 RX ADMIN — EPHEDRINE SULFATE 5 MG: 50 INJECTION INTRAVENOUS at 09:56

## 2018-09-06 RX ADMIN — DOCUSATE SODIUM 100 MG: 100 CAPSULE, LIQUID FILLED ORAL at 20:43

## 2018-09-06 RX ADMIN — PROPOFOL 150 MG: 10 INJECTION, EMULSION INTRAVENOUS at 08:22

## 2018-09-06 RX ADMIN — LIDOCAINE HYDROCHLORIDE 100 MG: 20 INJECTION, SOLUTION INFILTRATION; PERINEURAL at 09:19

## 2018-09-06 RX ADMIN — PHENYLEPHRINE HYDROCHLORIDE 100 MCG: 10 INJECTION, SOLUTION INTRAMUSCULAR; INTRAVENOUS; SUBCUTANEOUS at 08:26

## 2018-09-06 RX ADMIN — SODIUM CHLORIDE, SODIUM LACTATE, POTASSIUM CHLORIDE, AND CALCIUM CHLORIDE: 600; 310; 30; 20 INJECTION, SOLUTION INTRAVENOUS at 07:54

## 2018-09-06 RX ADMIN — DEXAMETHASONE SODIUM PHOSPHATE 10 MG: 4 INJECTION, SOLUTION INTRA-ARTICULAR; INTRALESIONAL; INTRAMUSCULAR; INTRAVENOUS; SOFT TISSUE at 23:26

## 2018-09-06 RX ADMIN — ROCURONIUM BROMIDE 50 MG: 10 INJECTION, SOLUTION INTRAVENOUS at 08:22

## 2018-09-06 RX ADMIN — HYDROMORPHONE HYDROCHLORIDE 1 MG: 2 INJECTION, SOLUTION INTRAMUSCULAR; INTRAVENOUS; SUBCUTANEOUS at 09:09

## 2018-09-06 RX ADMIN — SODIUM CHLORIDE, POTASSIUM CHLORIDE, SODIUM LACTATE AND CALCIUM CHLORIDE: 600; 310; 30; 20 INJECTION, SOLUTION INTRAVENOUS at 07:31

## 2018-09-06 RX ADMIN — ROCURONIUM BROMIDE 25 MG: 10 INJECTION, SOLUTION INTRAVENOUS at 10:14

## 2018-09-06 RX ADMIN — SODIUM CHLORIDE, SODIUM LACTATE, POTASSIUM CHLORIDE, AND CALCIUM CHLORIDE: 600; 310; 30; 20 INJECTION, SOLUTION INTRAVENOUS at 09:11

## 2018-09-06 RX ADMIN — EPHEDRINE SULFATE 5 MG: 50 INJECTION INTRAVENOUS at 11:28

## 2018-09-06 RX ADMIN — Medication 2 G: at 11:16

## 2018-09-06 RX ADMIN — SODIUM CHLORIDE, SODIUM LACTATE, POTASSIUM CHLORIDE, AND CALCIUM CHLORIDE: 600; 310; 30; 20 INJECTION, SOLUTION INTRAVENOUS at 11:30

## 2018-09-06 RX ADMIN — ROCURONIUM BROMIDE 20 MG: 10 INJECTION, SOLUTION INTRAVENOUS at 09:10

## 2018-09-06 RX ADMIN — ENOXAPARIN SODIUM 40 MG: 40 INJECTION SUBCUTANEOUS at 16:33

## 2018-09-06 RX ADMIN — OXYCODONE HYDROCHLORIDE AND ACETAMINOPHEN 2 TABLET: 5; 325 TABLET ORAL at 23:33

## 2018-09-06 RX ADMIN — CEFAZOLIN SODIUM 2 G: 10 INJECTION, POWDER, FOR SOLUTION INTRAVENOUS at 18:34

## 2018-09-06 RX ADMIN — ONDANSETRON 8 MG: 2 INJECTION INTRAMUSCULAR; INTRAVENOUS at 08:17

## 2018-09-06 RX ADMIN — HYDROMORPHONE HYDROCHLORIDE 1 MG: 2 INJECTION, SOLUTION INTRAMUSCULAR; INTRAVENOUS; SUBCUTANEOUS at 09:01

## 2018-09-06 RX ADMIN — TRANEXAMIC ACID 1300 MG: 650 TABLET ORAL at 23:26

## 2018-09-06 RX ADMIN — Medication 2 G: at 08:21

## 2018-09-06 RX ADMIN — LIDOCAINE HYDROCHLORIDE 150 MG: 20 INJECTION, SOLUTION INFILTRATION; PERINEURAL at 08:22

## 2018-09-06 RX ADMIN — DEXAMETHASONE SODIUM PHOSPHATE 10 MG: 4 INJECTION, SOLUTION INTRA-ARTICULAR; INTRALESIONAL; INTRAMUSCULAR; INTRAVENOUS; SOFT TISSUE at 18:34

## 2018-09-06 RX ADMIN — ALBUMIN (HUMAN) 250 ML: 12.5 INJECTION, SOLUTION INTRAVENOUS at 08:42

## 2018-09-06 RX ADMIN — TRANEXAMIC ACID 1300 MG: 650 TABLET ORAL at 16:33

## 2018-09-06 RX ADMIN — POTASSIUM CHLORIDE, DEXTROSE MONOHYDRATE AND SODIUM CHLORIDE 1000 ML: 150; 5; 450 INJECTION, SOLUTION INTRAVENOUS at 16:05

## 2018-09-06 RX ADMIN — DEXAMETHASONE SODIUM PHOSPHATE 8 MG: 4 INJECTION, SOLUTION INTRAMUSCULAR; INTRAVENOUS at 08:23

## 2018-09-06 RX ADMIN — SUGAMMADEX 200 MG: 100 INJECTION, SOLUTION INTRAVENOUS at 12:11

## 2018-09-06 RX ADMIN — OXYCODONE HYDROCHLORIDE 5 MG: 5 TABLET ORAL at 16:33

## 2018-09-06 RX ADMIN — EPHEDRINE SULFATE 10 MG: 50 INJECTION INTRAVENOUS at 08:27

## 2018-09-06 RX ADMIN — EPHEDRINE SULFATE 10 MG: 50 INJECTION INTRAVENOUS at 08:42

## 2018-09-06 RX ADMIN — CELECOXIB 200 MG: 100 CAPSULE ORAL at 20:43

## 2018-09-06 ASSESSMENT — PULMONARY FUNCTION TESTS
PIF_VALUE: 18
PIF_VALUE: 18
PIF_VALUE: 14
PIF_VALUE: 19
PIF_VALUE: 18
PIF_VALUE: 18
PIF_VALUE: 17
PIF_VALUE: 18
PIF_VALUE: 15
PIF_VALUE: 17
PIF_VALUE: 18
PIF_VALUE: 15
PIF_VALUE: 18
PIF_VALUE: 17
PIF_VALUE: 19
PIF_VALUE: 19
PIF_VALUE: 18
PIF_VALUE: 20
PIF_VALUE: 15
PIF_VALUE: 19
PIF_VALUE: 20
PIF_VALUE: 18
PIF_VALUE: 12
PIF_VALUE: 19
PIF_VALUE: 18
PIF_VALUE: 14
PIF_VALUE: 18
PIF_VALUE: 19
PIF_VALUE: 18
PIF_VALUE: 19
PIF_VALUE: 19
PIF_VALUE: 18
PIF_VALUE: 1
PIF_VALUE: 19
PIF_VALUE: 15
PIF_VALUE: 15
PIF_VALUE: 19
PIF_VALUE: 19
PIF_VALUE: 14
PIF_VALUE: 20
PIF_VALUE: 20
PIF_VALUE: 19
PIF_VALUE: 18
PIF_VALUE: 19
PIF_VALUE: 23
PIF_VALUE: 18
PIF_VALUE: 18
PIF_VALUE: 20
PIF_VALUE: 19
PIF_VALUE: 15
PIF_VALUE: 18
PIF_VALUE: 19
PIF_VALUE: 18
PIF_VALUE: 19
PIF_VALUE: 19
PIF_VALUE: 18
PIF_VALUE: 19
PIF_VALUE: 15
PIF_VALUE: 20
PIF_VALUE: 18
PIF_VALUE: 19
PIF_VALUE: 17
PIF_VALUE: 20
PIF_VALUE: 19
PIF_VALUE: 14
PIF_VALUE: 18
PIF_VALUE: 15
PIF_VALUE: 14
PIF_VALUE: 19
PIF_VALUE: 17
PIF_VALUE: 19
PIF_VALUE: 19
PIF_VALUE: 5
PIF_VALUE: 19
PIF_VALUE: 19
PIF_VALUE: 18
PIF_VALUE: 20
PIF_VALUE: 4
PIF_VALUE: 18
PIF_VALUE: 19
PIF_VALUE: 18
PIF_VALUE: 18
PIF_VALUE: 20
PIF_VALUE: 18
PIF_VALUE: 19
PIF_VALUE: 17
PIF_VALUE: 1
PIF_VALUE: 18
PIF_VALUE: 19
PIF_VALUE: 16
PIF_VALUE: 18
PIF_VALUE: 18
PIF_VALUE: 17
PIF_VALUE: 3
PIF_VALUE: 19
PIF_VALUE: 18
PIF_VALUE: 20
PIF_VALUE: 18
PIF_VALUE: 19
PIF_VALUE: 13
PIF_VALUE: 8
PIF_VALUE: 18
PIF_VALUE: 18
PIF_VALUE: 19
PIF_VALUE: 19
PIF_VALUE: 18
PIF_VALUE: 19
PIF_VALUE: 18
PIF_VALUE: 18
PIF_VALUE: 19
PIF_VALUE: 19
PIF_VALUE: 17
PIF_VALUE: 19
PIF_VALUE: 18
PIF_VALUE: 18
PIF_VALUE: 17
PIF_VALUE: 20
PIF_VALUE: 1
PIF_VALUE: 19
PIF_VALUE: 19
PIF_VALUE: 18
PIF_VALUE: 18
PIF_VALUE: 19
PIF_VALUE: 19
PIF_VALUE: 18
PIF_VALUE: 14
PIF_VALUE: 17
PIF_VALUE: 19
PIF_VALUE: 18
PIF_VALUE: 14
PIF_VALUE: 21
PIF_VALUE: 16
PIF_VALUE: 19
PIF_VALUE: 18
PIF_VALUE: 19
PIF_VALUE: 19
PIF_VALUE: 4
PIF_VALUE: 19
PIF_VALUE: 19
PIF_VALUE: 15
PIF_VALUE: 18
PIF_VALUE: 20
PIF_VALUE: 19
PIF_VALUE: 20
PIF_VALUE: 19
PIF_VALUE: 16
PIF_VALUE: 18
PIF_VALUE: 2
PIF_VALUE: 20
PIF_VALUE: 19
PIF_VALUE: 18
PIF_VALUE: 19
PIF_VALUE: 19
PIF_VALUE: 16
PIF_VALUE: 19
PIF_VALUE: 20
PIF_VALUE: 17
PIF_VALUE: 17
PIF_VALUE: 6
PIF_VALUE: 14
PIF_VALUE: 18
PIF_VALUE: 19
PIF_VALUE: 18
PIF_VALUE: 19
PIF_VALUE: 18
PIF_VALUE: 18
PIF_VALUE: 16
PIF_VALUE: 18
PIF_VALUE: 18
PIF_VALUE: 19
PIF_VALUE: 19
PIF_VALUE: 18
PIF_VALUE: 18
PIF_VALUE: 19
PIF_VALUE: 1
PIF_VALUE: 14
PIF_VALUE: 18
PIF_VALUE: 19
PIF_VALUE: 19
PIF_VALUE: 18
PIF_VALUE: 19
PIF_VALUE: 18
PIF_VALUE: 16
PIF_VALUE: 19
PIF_VALUE: 19
PIF_VALUE: 20
PIF_VALUE: 18
PIF_VALUE: 16
PIF_VALUE: 19
PIF_VALUE: 19
PIF_VALUE: 17
PIF_VALUE: 18
PIF_VALUE: 15
PIF_VALUE: 19
PIF_VALUE: 18
PIF_VALUE: 15
PIF_VALUE: 17
PIF_VALUE: 18
PIF_VALUE: 19
PIF_VALUE: 20
PIF_VALUE: 19
PIF_VALUE: 18
PIF_VALUE: 14
PIF_VALUE: 16
PIF_VALUE: 18
PIF_VALUE: 18
PIF_VALUE: 19
PIF_VALUE: 18
PIF_VALUE: 19
PIF_VALUE: 18
PIF_VALUE: 18
PIF_VALUE: 0
PIF_VALUE: 18
PIF_VALUE: 19
PIF_VALUE: 19
PIF_VALUE: 1
PIF_VALUE: 19
PIF_VALUE: 20
PIF_VALUE: 14
PIF_VALUE: 18
PIF_VALUE: 17
PIF_VALUE: 20
PIF_VALUE: 20

## 2018-09-06 ASSESSMENT — PAIN DESCRIPTION - ORIENTATION
ORIENTATION: RIGHT

## 2018-09-06 ASSESSMENT — PAIN SCALES - GENERAL
PAINLEVEL_OUTOF10: 3
PAINLEVEL_OUTOF10: 4
PAINLEVEL_OUTOF10: 3
PAINLEVEL_OUTOF10: 5
PAINLEVEL_OUTOF10: 4
PAINLEVEL_OUTOF10: 4
PAINLEVEL_OUTOF10: 2
PAINLEVEL_OUTOF10: 5

## 2018-09-06 ASSESSMENT — PAIN DESCRIPTION - DESCRIPTORS
DESCRIPTORS: PRESSURE
DESCRIPTORS: TIGHTNESS
DESCRIPTORS: ACHING
DESCRIPTORS: PRESSURE
DESCRIPTORS: ACHING

## 2018-09-06 ASSESSMENT — PAIN DESCRIPTION - LOCATION
LOCATION: HIP

## 2018-09-06 ASSESSMENT — PAIN DESCRIPTION - ONSET
ONSET: ON-GOING

## 2018-09-06 ASSESSMENT — PAIN DESCRIPTION - PROGRESSION
CLINICAL_PROGRESSION: GRADUALLY WORSENING
CLINICAL_PROGRESSION: NOT CHANGED
CLINICAL_PROGRESSION: GRADUALLY WORSENING
CLINICAL_PROGRESSION: NOT CHANGED
CLINICAL_PROGRESSION: GRADUALLY WORSENING

## 2018-09-06 ASSESSMENT — PAIN DESCRIPTION - PAIN TYPE
TYPE: SURGICAL PAIN
TYPE: ACUTE PAIN;SURGICAL PAIN

## 2018-09-06 ASSESSMENT — PAIN DESCRIPTION - FREQUENCY
FREQUENCY: CONTINUOUS

## 2018-09-06 ASSESSMENT — PAIN - FUNCTIONAL ASSESSMENT: PAIN_FUNCTIONAL_ASSESSMENT: 0-10

## 2018-09-06 NOTE — PROGRESS NOTES
Patient admitted to [address] from PACU. Patient alert and oriented, VSS. Dressing is intact, prevena in place. Neuro WNL. Patient oriented to room, given call light and instructed on use. Fall precautions in place. Will continue to monitor.

## 2018-09-06 NOTE — H&P
Wt 200 lb (90.7 kg)   SpO2 97%   BMI 31.32 kg/m²      Heart:  regular rate and rhythm, no murmur    Lungs:  No increased work of breathing, good air exchange, clear to auscultation bilaterally, no crackles or wheezing    Abdomen:  soft, non-distended, non-tender, no rebound tenderness or guarding, normal active bowel sounds and no masses palpated    ASSESSMENT AND PLAN:    1. Patient seen and focused exam done today- no new changes since last physical exam on 8-    2. Access to ancillary services are available per request of the provider.     Azra Rodriguez     9/6/2018

## 2018-09-06 NOTE — DISCHARGE SUMMARY
defecated before discharge. The patient had no new complication or adverse events. none      The patient was stable at discharge. Follow-up is arranged with Dr. Alejandrina Sheriff for clinical and radiographic review in the next two to three weeks. Written instructions, prescriptions, and directions were provided in detail. Thromboembolic prevention was provided with mechanical and pharmacologic means as necessary, and the patient will continue the pharmacologist prophylaxis as an outpatient as directed by Dr. Alejandrina Sheriff. The patients wound will be addressed by a SNF staff and sterile wound care will continue for about 18 days and/or until the staples are removed. The patient is discharged in a stable and satisfactory condition and is discharged to SNF    No Known Allergies    Pain Management: This patient is recovering from a major orthopedic hip reconstructive surgery  In my experience (over 30 years) this type of surgery will require narcotic analgesics will likely be required for more than 7 days. It is medically necessary to exceed an average daily dose of 30 MED to provide adequate analgesia to ensure patient comfort and successful rehabilitation. The patient was advised to use the minimum possible dose to adequately control pain and facilitate rehabilitation. We will attempt to discontinue all narcotic analgesics as soon as possible. The risks and benefits of narcotic addiction, substance abuse and all potential side effects were all carefully discussed. The patient voiced understanding, consent, and agreement.    __________________________________  Kilpatrick Annel Sheriff M.D., M.B. A.

## 2018-09-06 NOTE — ANESTHESIA PRE PROCEDURE
infusion   Intravenous Continuous Umu Beltran MD        ceFAZolin (ANCEF) 2 g in dextrose 5 % 50 mL IVPB  2 g Intravenous Once Woody Pacheco MD        HYDROmorphone (DILAUDID) injection 0.25 mg  0.25 mg Intravenous Q5 Min PRN Crow Stock MD        HYDROmorphone (DILAUDID) injection 0.5 mg  0.5 mg Intravenous Q5 Min PRN Crow Stock MD        morphine injection 1 mg  1 mg Intravenous Q5 Min PRN Crow Stock MD        HYDROmorphone (DILAUDID) injection 0.5 mg  0.5 mg Intravenous Q5 Min PRN Crow Stock MD        oxyCODONE (ROXICODONE) immediate release tablet 5 mg  5 mg Oral PRN Crow Stock MD        Or    oxyCODONE (ROXICODONE) immediate release tablet 10 mg  10 mg Oral PRN Crow Stock MD        diphenhydrAMINE (BENADRYL) injection 12.5 mg  12.5 mg Intravenous Once PRN Crow Stock MD        metoclopramide (REGLAN) injection 10 mg  10 mg Intravenous Once PRN Crow Stock MD        promethazine (PHENERGAN) injection 6.25 mg  6.25 mg Intravenous Once PRN Crow Stock MD        labetalol (NORMODYNE;TRANDATE) injection 5 mg  5 mg Intravenous Q10 Min PRN Crow Stock MD        hydrALAZINE (APRESOLINE) injection 5 mg  5 mg Intravenous Q10 Min PRN Crow Stock MD        meperidine (DEMEROL) injection 12.5 mg  12.5 mg Intravenous Q5 Min PRN Crow Stock MD           Allergies:  No Known Allergies    Problem List:    Patient Active Problem List   Diagnosis Code    Hypothyroidism E03.9    Primary osteoarthritis of both hips M16.0    Weakness of both hips R29.898    Greater trochanteric bursitis of right hip M70.61    Low back pain M54.5    DDD (degenerative disc disease), lumbar M51.36    Trochanteric bursitis of both hips M70.61, M70.62    Right hip pain M25.551    Primary osteoarthritis of right hip M16.11    Strain of hip flexor, right, initial encounter S76.011A    Iliotibial band syndrome of right side M76.31    Weakness of right hip

## 2018-09-06 NOTE — PROGRESS NOTES
Patient ambulated with RN and walker, patient tolerated well. Mckinnon catheter removed. Will continue to monitor.

## 2018-09-06 NOTE — BRIEF OP NOTE
Brief Postoperative Note  ______________________________________________________________    Patient: Lakeisha Morning  YOB: 1945  MRN: [id number]  Date of Procedure: 9/6/2018    Pre-Op Diagnosis: OSTEOARTHRITIS RIGHT HIP    Post-Op Diagnosis: Same       Procedure(s):  RIGHT TOTAL HIP ARTHROPLASTY    Anesthesia: General    Surgeon(s):  Mark Anthony Lui MD    Staff:  Surgical Assistant: Sofía Hyman; Roldan Stockton RN  Scrub Person First: Jaden Ayala     Estimated Blood Loss: 506 mls    Complications: None    Specimens:   * No specimens in log *    Implants:  * No implants in log *      Drains:   Urethral Catheter Straight-tip 16 fr (Active)   $ Urethral catheter insertion Inserted for procedure 9/6/2018 10:33 AM   Catheter Indications Perioperative use in selected surgeries including but not limited to urologic, pelvic or need for intraoperative monitoring of urinary output due to prolonged surgery, large volume infusion or need for diuretic therapy in surgery 9/6/2018 10:33 AM   Urine Color Yellow 9/6/2018 10:33 AM   Urine Appearance Clear 9/6/2018 10:33 AM       Findings: Sarah Mckeon MD  Date: 9/6/2018  Time: 12:06 PM

## 2018-09-06 NOTE — ANESTHESIA PROCEDURE NOTES
Peripheral Block    Patient location during procedure: pre-op  Staffing  Anesthesiologist: Beni Forman  Performed: anesthesiologist   Preanesthetic Checklist  Completed: patient identified, site marked, surgical consent, pre-op evaluation, timeout performed, IV checked, risks and benefits discussed, monitors and equipment checked, anesthesia consent given, oxygen available and patient being monitored  Peripheral Block  Patient position: supine  Prep: ChloraPrep  Patient monitoring: cardiac monitor, continuous pulse ox, frequent blood pressure checks and IV access  Block type: Fascia iliaca  Laterality: right  Injection technique: single-shot  Procedures: ultrasound guided  Provider prep: mask and sterile gloves  Needle  Needle type: short-bevel   Needle gauge: 22 G  Needle length: 8 cm  Needle localization: ultrasound guidance  Assessment  Injection assessment: negative aspiration for heme and local visualized surrounding nerve on ultrasound  Paresthesia pain: none  Slow fractionated injection: yes  Hemodynamics: stable  Additional Notes  Ultrasound-Guided Right Fascia Iliaca Block Note     Indication: Postoperative analgesia upon request of the attending surgeon.     Procedure: Informed consent obtained and timeout procedure performed. Midazolam 2mg and Fentanyl 100 micrograms were administered intravenously for block sedation. Patient supine, landmarks identified, sterile prep. Under direct ultrasound visualization a 22G 80mm insulated regional block needle was inserted medial to the anterior superior iliac spine and directed in a medial fashion until puncturing the fascia underneath the sartorius muscle. Ropivacaine 0.25% was injected into this tissue plane with spread visualized on ultrasound to a total volume of 60cc. Negative aspiration for heme prior to injection. Block tolerated well; there were no apparent complications. Reason for block: at surgeon's request            Oralia Hubbard.  Barbie Pink MD September 6, 2018 10:48 AM

## 2018-09-06 NOTE — ANESTHESIA POSTPROCEDURE EVALUATION
Department of Anesthesiology  Postprocedure Note    Patient: Mira Chong  MRN: [id number]  [place]: 1945  Date of evaluation: 9/6/2018  Time:  3:54 PM     Procedure Summary     Date:  09/06/18 Room / Location:  [place] [address] / [facility]    Anesthesia Start:  6301 Anesthesia Stop:  9369    Procedure:  RIGHT TOTAL HIP ARTHROPLASTY (Right ) Diagnosis:  (OSTEOARTHRITIS RIGHT HIP)    Surgeon:  Darlene Calderon MD Responsible Provider:  Ameya Cool MD    Anesthesia Type:  general ASA Status:  2          Anesthesia Type: general    Adeline Phase I: Adeline Score: 10    Adeline Phase II:      Last vitals: Reviewed and per EMR flowsheets.        Anesthesia Post Evaluation    Patient location during evaluation: PACU  Patient participation: complete - patient participated  Level of consciousness: awake and alert  Pain score: 2  Airway patency: patent  Nausea & Vomiting: no nausea and no vomiting  Complications: no  Cardiovascular status: hemodynamically stable  Respiratory status: acceptable  Hydration status: euvolemic

## 2018-09-07 LAB
HCT VFR BLD CALC: 30.2 % (ref 40.5–52.5)
HEMOGLOBIN: 10.2 G/DL (ref 13.5–17.5)

## 2018-09-07 PROCEDURE — 6370000000 HC RX 637 (ALT 250 FOR IP): Performed by: ORTHOPAEDIC SURGERY

## 2018-09-07 PROCEDURE — 36415 COLL VENOUS BLD VENIPUNCTURE: CPT

## 2018-09-07 PROCEDURE — 97161 PT EVAL LOW COMPLEX 20 MIN: CPT

## 2018-09-07 PROCEDURE — 1200000000 HC SEMI PRIVATE

## 2018-09-07 PROCEDURE — G8979 MOBILITY GOAL STATUS: HCPCS

## 2018-09-07 PROCEDURE — 97110 THERAPEUTIC EXERCISES: CPT

## 2018-09-07 PROCEDURE — G8987 SELF CARE CURRENT STATUS: HCPCS

## 2018-09-07 PROCEDURE — 97535 SELF CARE MNGMENT TRAINING: CPT

## 2018-09-07 PROCEDURE — 97530 THERAPEUTIC ACTIVITIES: CPT

## 2018-09-07 PROCEDURE — 97116 GAIT TRAINING THERAPY: CPT

## 2018-09-07 PROCEDURE — 97165 OT EVAL LOW COMPLEX 30 MIN: CPT

## 2018-09-07 PROCEDURE — 99221 1ST HOSP IP/OBS SF/LOW 40: CPT | Performed by: INTERNAL MEDICINE

## 2018-09-07 PROCEDURE — 6360000002 HC RX W HCPCS: Performed by: ORTHOPAEDIC SURGERY

## 2018-09-07 PROCEDURE — 85018 HEMOGLOBIN: CPT

## 2018-09-07 PROCEDURE — G8978 MOBILITY CURRENT STATUS: HCPCS

## 2018-09-07 PROCEDURE — 2580000003 HC RX 258: Performed by: ORTHOPAEDIC SURGERY

## 2018-09-07 PROCEDURE — G8988 SELF CARE GOAL STATUS: HCPCS

## 2018-09-07 PROCEDURE — 85014 HEMATOCRIT: CPT

## 2018-09-07 RX ADMIN — DOCUSATE SODIUM 100 MG: 100 CAPSULE, LIQUID FILLED ORAL at 09:11

## 2018-09-07 RX ADMIN — Medication 10 ML: at 09:12

## 2018-09-07 RX ADMIN — ENOXAPARIN SODIUM 40 MG: 40 INJECTION SUBCUTANEOUS at 09:11

## 2018-09-07 RX ADMIN — LEVOTHYROXINE SODIUM 50 MCG: 50 TABLET ORAL at 09:11

## 2018-09-07 RX ADMIN — CELECOXIB 200 MG: 100 CAPSULE ORAL at 20:26

## 2018-09-07 RX ADMIN — CEFAZOLIN SODIUM 2 G: 10 INJECTION, POWDER, FOR SOLUTION INTRAVENOUS at 02:42

## 2018-09-07 RX ADMIN — Medication 10 ML: at 23:21

## 2018-09-07 RX ADMIN — OXYCODONE HYDROCHLORIDE 5 MG: 5 TABLET ORAL at 23:20

## 2018-09-07 RX ADMIN — TAMSULOSIN HYDROCHLORIDE 0.4 MG: 0.4 CAPSULE ORAL at 09:11

## 2018-09-07 RX ADMIN — DOCUSATE SODIUM 100 MG: 100 CAPSULE, LIQUID FILLED ORAL at 20:26

## 2018-09-07 ASSESSMENT — PAIN DESCRIPTION - LOCATION
LOCATION: HIP
LOCATION: HIP

## 2018-09-07 ASSESSMENT — PAIN SCALES - GENERAL
PAINLEVEL_OUTOF10: 6
PAINLEVEL_OUTOF10: 0
PAINLEVEL_OUTOF10: 4
PAINLEVEL_OUTOF10: 0
PAINLEVEL_OUTOF10: 0

## 2018-09-07 ASSESSMENT — PAIN DESCRIPTION - PAIN TYPE
TYPE: SURGICAL PAIN
TYPE: SURGICAL PAIN

## 2018-09-07 ASSESSMENT — PAIN DESCRIPTION - DESCRIPTORS
DESCRIPTORS: ACHING
DESCRIPTORS: PRESSURE

## 2018-09-07 ASSESSMENT — PAIN DESCRIPTION - FREQUENCY
FREQUENCY: CONTINUOUS
FREQUENCY: CONTINUOUS

## 2018-09-07 ASSESSMENT — PAIN DESCRIPTION - ONSET
ONSET: ON-GOING
ONSET: ON-GOING

## 2018-09-07 ASSESSMENT — PAIN DESCRIPTION - ORIENTATION
ORIENTATION: RIGHT
ORIENTATION: RIGHT

## 2018-09-07 ASSESSMENT — PAIN DESCRIPTION - PROGRESSION
CLINICAL_PROGRESSION: NOT CHANGED
CLINICAL_PROGRESSION: NOT CHANGED

## 2018-09-07 NOTE — PROGRESS NOTES
Pain: Yes (R hip, not rated, RN aware.  )       Orientation  Orientation  Overall Orientation Status: Within Functional Limits    Social/Functional History  Social/Functional History  Lives With: Spouse  Type of Home: House  Home Layout: Able to Live on Main level with bedroom/bathroom (laundry on first floor)  Home Access:  (4 + 1 JONA without rail )  Bathroom Shower/Tub: Walk-in shower  Bathroom Toilet: Standard  Bathroom Equipment: Grab bars in shower, Shower chair  Home Equipment: Cane, Standard walker, Crutches  ADL Assistance: Needs assistance (wife was helping with socks/shoes, toileting hygeine, bathing back)  Homemaking Assistance:  (wife primarily doing)  Ambulation Assistance: Independent (with cane sometimes)  Transfer Assistance: Independent  Active : Yes  Leisure & Hobbies: working on house, gardening  Additional Comments: Pt/wife Babysit 11year old and  332 year old twin grandchildren. \"There are all kinds of fall hazards in my house. \"  Reports trouble with L foot/ankle and was wearing brace on LLE. Needs to have triple arthrodesis done on LLE.     Objective          AROM RLE (degrees)  RLE AROM:  (decreased grossly s/p THR)  AROM LLE (degrees)  LLE AROM : WFL  Strength RLE  Strength RLE:  (decreased grossly s/p R THR mod assist for SLR)  Strength LLE  Strength LLE: WFL           Transfers  Sit to Stand: Contact guard assistance (from chair)  Stand to sit: Contact guard assistance (to chair, cues for safety backing up)  Ambulation  Ambulation?: Yes  Ambulation 1  Device: Rolling Walker  Assistance: Contact guard assistance (to SBA)  Quality of Gait: cues for sequencing initially, decreased stance time RLE, step to pattern  Distance: 100', 20'         Exercises  Straight Leg Raise: min assist x 10 reps RLE  Quad Sets: independent x 10 reps RLE  Heelslides: independent x 10 reps RLE  Gluteal Sets: independent x 10 reps   Hip Abduction: min assist  x 10 reps RLE  Knee Long Arc Quad: independent x goal 3: Pt will up/down 4 steps with bilat crutches SBA  Short term goal 4: Pt will be independent with RLE HEP x 10 reps ea  Short term goal 5: Pt will independently recall 3/3 hip precautions       Therapy Time   Individual Concurrent Group Co-treatment   Time In 0930         Time Out 1025         Minutes 55              Timed Code Treatment Minutes: 40      Total Treatment Minutes:  55  If pt d/c'd prior to next treatment, this note serves as a discharge note.       Susannah Lesch, PT

## 2018-09-07 NOTE — PLAN OF CARE
Problem: Pain:  Goal: Pain level will decrease  Pain level will decrease   Outcome: Ongoing  Scheduled pain medication giving adequate relief. No need for prn pain medication at this time. Problem: Falls - Risk of:  Goal: Will remain free from falls  Will remain free from falls   Outcome: Ongoing  Pt remains free of falls this shift. Bed is locked and in the lowest position. Bed alarm is on, call light and bedside table are within reach. Will continue to monitor.

## 2018-09-07 NOTE — PROGRESS NOTES
Surgery Post-Op Day #1 Note  [facility] Found  SUBJECTIVE:  Hip pain surgical      OBJECTIVE:  Infusions:   dextrose 5% and 0.45% NaCl with KCl 20 mEq 1,000 mL (09/06/18 1605)       I/O:I/O last 3 completed shifts: In: 2712 [I.V.:2712]  Out: 2650 [Urine:2300; Blood:350]           Wt Readings from Last 1 Encounters:   09/06/18 200 lb (90.7 kg)        LABS:    Recent Labs      09/07/18   0529   HGB  10.2*   HCT  30.2*      No results for input(s): NA, K, CL, CO2, PHOS, BUN, CREATININE in the last 72 hours. Invalid input(s): CA   No results for input(s): AST, ALT, ALB, BILIDIR, BILITOT, ALKPHOS in the last 72 hours. No results for input(s): LIPASE, AMYLASE in the last 72 hours. No results for input(s): PROT, INR, APTT in the last 72 hours. No results for input(s): CKTOTAL, CKMB, CKMBINDEX, TROPONINI in the last 72 hours. Exam:/74   Pulse 87   Temp 97.7 °F (36.5 °C) (Oral)   Resp 16   Ht 5' 7\" (1.702 m)   Wt 200 lb (90.7 kg)   SpO2 98%   BMI 31.32 kg/m²    General appearance: alert, appears stated age and cooperative       ASSESSMENT/PLAN:   Pt. is a 81 y.o. male s/p R THR    Mobility: fair    Diet:  Per preop as tolerated. Antibiotics discontinued Per SCIP    Mckinnon Catheter:  remove      DVT prophylaxis: Intermittent compression devices and Aspirin and/or Lovenox per protocol    GI Prophylaxis: per medicine    Beta Blocker:  should be considered, specific regimen per anesthesia  Wound - dressing dry and intact    N/V exam intact    Progress treatment plan with meds and PT/OT    D/C plans discussed. Kendrick exam bilaterally negative. Wound stable to date    Discussed with nursing and chart reviewed.     Xrays: good positioning of the hip    Disposition: will discuss with staff    Mariela Blair MD 9/7/2018 6:25 AM

## 2018-09-07 NOTE — PROGRESS NOTES
Pt is alert and oriented with some hallucinations, pt has taken celebrex and percocet for the first time today. VSS. Pt's pain controlled on oral pain medication, will hold off on morning percocet since pt is having hallucinations. Pt denies nausea, tolerating diet, bowel sounds active. Surgical site is CDI, prevena wound vac remains in place. Abductor pillow applied while in bed. Pt ambulates x1 assist with walker/gait belt, demonstrating steady gait. Will continue to monitor.

## 2018-09-07 NOTE — CARE COORDINATION
Made referral to [facility] per patient's request.  Rohan Galarza needed and will be started today.   Electronically signed by BERNICE Arredondo, BURTONW on 9/7/2018 at 10:45 AM

## 2018-09-07 NOTE — PLAN OF CARE
Problem: Musculor/Skeletal Functional Status  Intervention: PT Evaluation/treatment  Increase Independent Mobility

## 2018-09-07 NOTE — CONSULTS
home meds. 3.  Further therapy pending above. Thank you for allowing me to see this most interesting patient. We will  follow with you.         Galen Burris MD    D: 09/07/2018 11:08:15       T: 09/07/2018 11:36:02     [email or web address]  Job#: [id number]     Doc#: [id number]    CC:

## 2018-09-07 NOTE — PROGRESS NOTES
wanting SNF at discharge to become more independent before discharge home w/ spouse. Pt states that hopeful for SNF at discharge - stats he/wife watch grandchildren and states there are multiple tripping hazards w/ the kids there. Pt with visual hallucinations - \"don't you see it? There are colorful circles moving around. \"  Pain Assessment  Patient Currently in Pain: Yes (R hip, not rated, RN aware.  )       Social/Functional History  Social/Functional History  Lives With: Spouse  Type of Home: House  Home Layout: Able to Live on Main level with bedroom/bathroom (laundry on first floor)  Home Access:  (4 + 1 JONA without rail )  Bathroom Shower/Tub: Walk-in shower  Bathroom Toilet: Standard  Bathroom Equipment: Grab bars in shower, Shower chair  Bathroom Accessibility: Accessible  Home Equipment: Cane, Standard walker, Crutches  ADL Assistance: Needs assistance (wife was helping with socks/shoes, toileting hygeine, bathing back)  Homemaking Assistance:  (wife primarily doing)  Ambulation Assistance: Independent (with cane sometimes)  Transfer Assistance: Independent  Active : Yes  Leisure & Hobbies: working on house, gardening  Additional Comments: Pt/wife Babysit 11year old and  332 year old twin grandchildren. \"There are all kinds of fall hazards in my house. \"  Reports trouble with L foot/ankle and was wearing brace on LLE. Needs to have triple arthrodesis done on LLE.          Objective   Vision: Within Functional Limits (wears glasses)  Hearing: Within functional limits    Orientation  Overall Orientation Status:  (oriented with conversation)     Balance  Sitting Balance: Stand by assistance  Standing Balance: Contact guard assistance    Functional Mobility  Functional - Mobility Device: Rolling Walker  Activity: To/from bathroom  Assist Level: Contact guard assistance; occasional cues for stepping sequencing  Toilet Transfers  Toilet - Technique: Ambulating (using wh walker)  Equipment Used: Standard

## 2018-09-08 PROBLEM — D50.0 BLOOD LOSS ANEMIA: Status: ACTIVE | Noted: 2018-09-08

## 2018-09-08 LAB
HCT VFR BLD CALC: 29.9 % (ref 40.5–52.5)
HEMOGLOBIN: 9.9 G/DL (ref 13.5–17.5)
PLATELET # BLD: 286 K/UL (ref 135–450)

## 2018-09-08 PROCEDURE — 2580000003 HC RX 258: Performed by: ORTHOPAEDIC SURGERY

## 2018-09-08 PROCEDURE — 97110 THERAPEUTIC EXERCISES: CPT

## 2018-09-08 PROCEDURE — 97535 SELF CARE MNGMENT TRAINING: CPT

## 2018-09-08 PROCEDURE — 6370000000 HC RX 637 (ALT 250 FOR IP): Performed by: ORTHOPAEDIC SURGERY

## 2018-09-08 PROCEDURE — 97116 GAIT TRAINING THERAPY: CPT

## 2018-09-08 PROCEDURE — 36415 COLL VENOUS BLD VENIPUNCTURE: CPT

## 2018-09-08 PROCEDURE — 85049 AUTOMATED PLATELET COUNT: CPT

## 2018-09-08 PROCEDURE — 85018 HEMOGLOBIN: CPT

## 2018-09-08 PROCEDURE — 6360000002 HC RX W HCPCS: Performed by: ORTHOPAEDIC SURGERY

## 2018-09-08 PROCEDURE — 99231 SBSQ HOSP IP/OBS SF/LOW 25: CPT | Performed by: INTERNAL MEDICINE

## 2018-09-08 PROCEDURE — 1200000000 HC SEMI PRIVATE

## 2018-09-08 PROCEDURE — 85014 HEMATOCRIT: CPT

## 2018-09-08 RX ORDER — OXYCODONE HYDROCHLORIDE 5 MG/1
10 TABLET ORAL EVERY 4 HOURS PRN
Status: DISCONTINUED | OUTPATIENT
Start: 2018-09-08 | End: 2018-09-10 | Stop reason: HOSPADM

## 2018-09-08 RX ADMIN — ENOXAPARIN SODIUM 40 MG: 40 INJECTION SUBCUTANEOUS at 07:41

## 2018-09-08 RX ADMIN — CELECOXIB 200 MG: 100 CAPSULE ORAL at 20:04

## 2018-09-08 RX ADMIN — CELECOXIB 200 MG: 100 CAPSULE ORAL at 07:41

## 2018-09-08 RX ADMIN — OXYCODONE HYDROCHLORIDE 5 MG: 5 TABLET ORAL at 18:06

## 2018-09-08 RX ADMIN — OXYCODONE HYDROCHLORIDE 10 MG: 5 TABLET ORAL at 22:29

## 2018-09-08 RX ADMIN — OXYCODONE HYDROCHLORIDE 5 MG: 5 TABLET ORAL at 11:52

## 2018-09-08 RX ADMIN — Medication 10 ML: at 22:29

## 2018-09-08 RX ADMIN — LEVOTHYROXINE SODIUM 50 MCG: 50 TABLET ORAL at 07:41

## 2018-09-08 RX ADMIN — OXYCODONE HYDROCHLORIDE 5 MG: 5 TABLET ORAL at 07:41

## 2018-09-08 RX ADMIN — ACETAMINOPHEN 650 MG: 325 TABLET ORAL at 02:51

## 2018-09-08 RX ADMIN — Medication 10 ML: at 07:41

## 2018-09-08 RX ADMIN — DOCUSATE SODIUM 100 MG: 100 CAPSULE, LIQUID FILLED ORAL at 20:04

## 2018-09-08 RX ADMIN — DOCUSATE SODIUM 100 MG: 100 CAPSULE, LIQUID FILLED ORAL at 07:41

## 2018-09-08 ASSESSMENT — PAIN DESCRIPTION - ONSET
ONSET: ON-GOING

## 2018-09-08 ASSESSMENT — PAIN SCALES - GENERAL
PAINLEVEL_OUTOF10: 7
PAINLEVEL_OUTOF10: 4
PAINLEVEL_OUTOF10: 4
PAINLEVEL_OUTOF10: 2
PAINLEVEL_OUTOF10: 0
PAINLEVEL_OUTOF10: 3
PAINLEVEL_OUTOF10: 0
PAINLEVEL_OUTOF10: 4
PAINLEVEL_OUTOF10: 2
PAINLEVEL_OUTOF10: 4
PAINLEVEL_OUTOF10: 2

## 2018-09-08 ASSESSMENT — PAIN DESCRIPTION - LOCATION
LOCATION: HIP

## 2018-09-08 ASSESSMENT — PAIN DESCRIPTION - ORIENTATION
ORIENTATION: RIGHT

## 2018-09-08 ASSESSMENT — PAIN DESCRIPTION - PAIN TYPE
TYPE: SURGICAL PAIN

## 2018-09-08 ASSESSMENT — PAIN DESCRIPTION - PROGRESSION
CLINICAL_PROGRESSION: NOT CHANGED
CLINICAL_PROGRESSION: GRADUALLY IMPROVING
CLINICAL_PROGRESSION: NOT CHANGED
CLINICAL_PROGRESSION: NOT CHANGED
CLINICAL_PROGRESSION: GRADUALLY WORSENING
CLINICAL_PROGRESSION: NOT CHANGED

## 2018-09-08 ASSESSMENT — PAIN DESCRIPTION - DESCRIPTORS
DESCRIPTORS: ACHING
DESCRIPTORS: PRESSURE
DESCRIPTORS: BURNING
DESCRIPTORS: BURNING
DESCRIPTORS: PRESSURE
DESCRIPTORS: ACHING
DESCRIPTORS: BURNING

## 2018-09-08 ASSESSMENT — PAIN DESCRIPTION - FREQUENCY
FREQUENCY: CONTINUOUS

## 2018-09-08 NOTE — PROGRESS NOTES
43.63  Mobility Inpatient CMS 0-100% Score: 46.58  Mobility Inpatient CMS G-Code Modifier : CK          Goals  Short term goals  Time Frame for Short term goals: By discharge  Short term goal 1: Sit to stand supervision. Ongoing 9/8  Short term goal 2: Pt will amb >200' with AD supervision. Ongoing 9/8  Short term goal 3: Pt will up/down 4 steps with bilat crutches SBA. Ongoing 9/8  Short term goal 4: Pt will be independent with RLE HEP x 10 reps ea. Ongoing 9/8  Short term goal 5: Pt will independently recall 3/3 hip precautions. MET 9/8    Plan    Plan  Times per week: 7  Times per day: Twice a day  Current Treatment Recommendations: Strengthening, ROM, Endurance Training, Functional Mobility Training, Gait Training, Stair training, Equipment Evaluation, Education, & procurement, Patient/Caregiver Education & Training, Safety Education & Training  Safety Devices  Type of devices: Nurse notified, Chair alarm in place, Call light within reach, Left in chair     Therapy Time   Individual Concurrent Group Co-treatment   Time In 0736         Time Out 0821         Minutes 45         Timed Code Treatment Minutes: [address], [place], [place] [id number]    If patient is discharged prior to next treatment, this note will serve as the discharge summary.

## 2018-09-08 NOTE — PLAN OF CARE
Problem: Pain:  Goal: Pain level will decrease  Pain level will decrease   Outcome: Ongoing  Pt. States pain is 4/10 to right hip. States prn pain meds are effective for him. Brings pain down to a 2-3/10. Problem: Mobility - Impaired:  Goal: Achieve maximum mobility level  Achieve maximum mobility level   Outcome: Ongoing  Pt. Ambulates well with rolling walker. Min assist. SBA X1 from staff.

## 2018-09-08 NOTE — PLAN OF CARE
Problem: Pain:  Goal: Pain level will decrease  Pain level will decrease   Outcome: Ongoing  Pt complains of surgical pain, prn pain medication given with adequate relief. Will continue to monitor. Problem: Falls - Risk of:  Goal: Will remain free from falls  Will remain free from falls   Outcome: Ongoing  Pt remains free of falls this shift. Bed is locked and in the lowest position. Bed alarm is on, call light and bedside table are within reach. Will continue to monitor.

## 2018-09-08 NOTE — PROGRESS NOTES
assistance  Ambulation  Ambulation?: Yes  Ambulation 1  Surface: level tile  Device: Rolling Walker  Assistance: Stand by assistance  Quality of Gait: decreased stance R LE, cues for not dragging R foot then improved  Distance: 100'  Stairs/Curb  Stairs?: Yes  Stairs  Rails: None  Device: Crutches  Assistance: Minimal assistance  Comment: up/down 5 steps x 2 reps - min A initially again with ascending 1st 2 steps as pt became unsteady but remaining steps/reps with CGA ascending and descending - did not need cues for sequencing        Exercises  Quad Sets: independent x 10 reps BLE with glut sets  Heelslides: independent x 10 reps RLE  Gluteal Sets: independent x 10 reps B LE with quad sets  Hip Abduction: x 10 reps R LE with Min A due to fatigue  Knee Long Arc Quad: independent x 10 reps RLE  Ankle Pumps: independent x 10 reps BLE                        Assessment   Body structures, Functions, Activity limitations: Decreased functional mobility ; Decreased ROM; Decreased strength  Assessment: Pt still with SBA for transfers/ambulation and min A progressing to CGA with steps. Pt planning SNF at D/C. Will continue to see while in hospital for maximizing functional independence and would benefit from therapy at D/C. Treatment Diagnosis: impaired functional mobility s/p R THR  Prognosis: Good  Patient Education: Pt educated on step sequencing and verbalized and demo good understanding this PM.    REQUIRES PT FOLLOW UP: Yes  Activity Tolerance  Activity Tolerance: Patient Tolerated treatment well     OutComes Score            AM-PAC Score  AM-PAC Inpatient Mobility Raw Score : 18  AM-PAC Inpatient T-Scale Score : 43.63  Mobility Inpatient CMS 0-100% Score: 46.58  Mobility Inpatient CMS G-Code Modifier : CK          Goals  Short term goals  Time Frame for Short term goals: By discharge  Short term goal 1: Sit to stand supervision. Ongoing 9/8  Short term goal 2: Pt will amb >200' with AD supervision.   Ongoing

## 2018-09-08 NOTE — PROGRESS NOTES
Pt. Doing well with ambulation with rolling walker. Pt. Leontine Livers around unit with staff with ease. Pt. States pain is around a 3-4/10. States prn pain meds are effective for him. No further c/o of hallucinations this shift. Pt. Has had visitors most of this shift. Prevena wound vac dressing is CDI. FWBAT. Fall precautions in place. Awaiting precert. Will continue to monitor. Call-light within reach.

## 2018-09-08 NOTE — PROGRESS NOTES
Comment  Comments: Pt with dressing UB (setup) and LB (shoes/socks/pants) Supervison and use of reacher/sock aid/long handled shoe horn. Pt educated on importance of sitting down to doff pants off LEs, pt attempted to step out of pants. Pt sit to stand SBA and pt ambulated with rw at SBA ~20 ft to toilet. Pt stood for toileting performed at Supervision. Pt then stood at sink at Supervision for grooming (oral care/comg style hair/wash face) performed at Independent. Pt then ambulated with rw at SBA back to chair. No LOB noted throughout therapy session. Pt stand to sit SBA. Call light in reach and chair alarm on. Pre Treatment Pain Screening  Intervention List: Patient able to continue with treatment  Comments / Details: Pt stated, \"I don't need any medicine. It's not bad. \"  Pain Assessment  Patient Currently in Pain: Yes  Pain Level: 4  Pain Type: Surgical pain  Pain Location: Hip  Pain Orientation: Right  Pain Descriptors: Burning  Pain Frequency: Continuous  Vital Signs  Patient Currently in Pain: Yes   Orientation  Orientation  Overall Orientation Status: Within Normal Limits  Objective    ADL  Grooming: Independent  UE Dressing: Setup  LE Dressing: Supervision;Verbal cueing  Toileting: Supervision        Balance  Standing Balance: Supervision  Standing Balance  Time: ~10 min  Activity: to/from bathroom, toileting, in stance at sink for grooming  Sit to stand: Stand by assistance  Stand to sit: Stand by assistance  Functional Mobility  Functional - Mobility Device: Rolling Walker  Activity: To/from bathroom  Assist Level: Stand by assistance     Transfers  Sit to stand: Stand by assistance  Stand to sit: Stand by assistance                                                                 Assessment   Performance deficits / Impairments: Decreased functional mobility ; Decreased ADL status  Assessment: Pt R THR (posterior precautions). Pt demonstrating Supervision for LB dressing using AE.  Pt SBA for functional mobility. Pt adherent to  posterior hip precautions. Pt is hopeful for SNF at discharge to maximize functional status prior to disharge home w/ spouse assist (spouse babysits young children and pt expressing concerns about multiple trip hazards and toys). If home, pt will need RTS w/ rails, reacher, sockaide, LH shoehorn. Continue per POC. Treatment Diagnosis: impaired ADLs and transfers s/p R THR  Prognosis: Good  Assistance / Modification: SBA for functionl mobility  Patient Education: LB dresssing   REQUIRES OT FOLLOW UP: Yes  Activity Tolerance  Activity Tolerance: Patient Tolerated treatment well  Safety Devices  Safety Devices in place: Yes  Type of devices: Left in chair;Chair alarm in place;Call light within reach;Nurse notified; All fall risk precautions in place          Plan   Plan  Times per week: 7  Times per day: Daily  Current Treatment Recommendations: Balance Training, Functional Mobility Training, Safety Education & Training, Self-Care / ADL  G-Code     OutComes Score                                           AM-PAC Score        AM-PAC Inpatient Daily Activity Raw Score: 21  AM-PAC Inpatient ADL T-Scale Score : 44.27  ADL Inpatient CMS 0-100% Score: 32.79  ADL Inpatient CMS G-Code Modifier : CJ    Goals  Short term goals  Time Frame for Short term goals: Discharge  Short term goal 1: 3in1 transfer with SBA - goal not addressed 9/8  Short term goal 2: LB dressing using AE and SBA - goal met 9/8  Short term goal 3: independent knowledge of safe shower stall transfer technique - goal not addressed 9/8  Short term goal 4: verbalize posterior hip precautions w/ no cues - goal met 9/8  Patient Goals   Patient goals : to discharge to SNF to be more independent prior to discharge home (pt/wife babysit and there are multiple tripping hazards per pt)       Therapy Time   Individual Concurrent Group Co-treatment   Time In 0938         Time Out 1010         Minutes 32         Timed Code Treatment Minutes: Jessica [address], [address]

## 2018-09-08 NOTE — PROGRESS NOTES
CBC:   Lab Results   Component Value Date    WBC 6.8 08/28/2018    RBC 4.08 08/28/2018    HGB 9.9 09/08/2018    HCT 29.9 09/08/2018    MCV 93.7 08/28/2018    MCH 31.6 08/28/2018    MCHC 33.8 08/28/2018    RDW 13.7 08/28/2018     09/08/2018    MPV 7.6 08/28/2018     BMP:    Lab Results   Component Value Date     08/28/2018    K 4.3 08/28/2018     08/28/2018    CO2 25 08/28/2018    BUN 26 08/28/2018    LABALBU 4.6 03/05/2013    CREATININE 0.8 08/28/2018    CALCIUM 9.3 08/28/2018    GFRAA >60 08/28/2018    GFRAA >60 03/05/2013    LABGLOM >60 08/28/2018    GLUCOSE 102 08/28/2018         ASSESSMENT AND PLAN      Patient Active Hospital Problem List:   Osteonecrosis of right hip ([address] (9/6/2018)    Assessment: Stable post op    Plan: Per orthopedics   Hypothyroidism ()    Assessment: Stable    Plan: This problem is stable. Continue present medications.   Post op anemia as a result of blood loss  Will start Iron po once bowel habits OK

## 2018-09-09 LAB
HCT VFR BLD CALC: 27 % (ref 40.5–52.5)
HEMOGLOBIN: 9.3 G/DL (ref 13.5–17.5)
PLATELET # BLD: 258 K/UL (ref 135–450)

## 2018-09-09 PROCEDURE — 94760 N-INVAS EAR/PLS OXIMETRY 1: CPT

## 2018-09-09 PROCEDURE — 97110 THERAPEUTIC EXERCISES: CPT

## 2018-09-09 PROCEDURE — 6360000002 HC RX W HCPCS: Performed by: ORTHOPAEDIC SURGERY

## 2018-09-09 PROCEDURE — 97530 THERAPEUTIC ACTIVITIES: CPT

## 2018-09-09 PROCEDURE — 2580000003 HC RX 258: Performed by: ORTHOPAEDIC SURGERY

## 2018-09-09 PROCEDURE — 85018 HEMOGLOBIN: CPT

## 2018-09-09 PROCEDURE — MISCLOD MISC IP SERVICE NONBILLABLE: Performed by: INTERNAL MEDICINE

## 2018-09-09 PROCEDURE — 97535 SELF CARE MNGMENT TRAINING: CPT

## 2018-09-09 PROCEDURE — 85014 HEMATOCRIT: CPT

## 2018-09-09 PROCEDURE — 36415 COLL VENOUS BLD VENIPUNCTURE: CPT

## 2018-09-09 PROCEDURE — 6370000000 HC RX 637 (ALT 250 FOR IP): Performed by: ORTHOPAEDIC SURGERY

## 2018-09-09 PROCEDURE — 1200000000 HC SEMI PRIVATE

## 2018-09-09 PROCEDURE — 97116 GAIT TRAINING THERAPY: CPT

## 2018-09-09 PROCEDURE — 85049 AUTOMATED PLATELET COUNT: CPT

## 2018-09-09 RX ADMIN — LEVOTHYROXINE SODIUM 50 MCG: 50 TABLET ORAL at 08:36

## 2018-09-09 RX ADMIN — OXYCODONE HYDROCHLORIDE 10 MG: 5 TABLET ORAL at 23:44

## 2018-09-09 RX ADMIN — CELECOXIB 200 MG: 100 CAPSULE ORAL at 08:36

## 2018-09-09 RX ADMIN — OXYCODONE HYDROCHLORIDE 5 MG: 5 TABLET ORAL at 18:38

## 2018-09-09 RX ADMIN — OXYCODONE HYDROCHLORIDE 10 MG: 5 TABLET ORAL at 03:25

## 2018-09-09 RX ADMIN — DOCUSATE SODIUM 100 MG: 100 CAPSULE, LIQUID FILLED ORAL at 08:36

## 2018-09-09 RX ADMIN — ENOXAPARIN SODIUM 40 MG: 40 INJECTION SUBCUTANEOUS at 08:36

## 2018-09-09 RX ADMIN — OXYCODONE HYDROCHLORIDE 5 MG: 5 TABLET ORAL at 13:46

## 2018-09-09 RX ADMIN — Medication 10 ML: at 08:36

## 2018-09-09 RX ADMIN — OXYCODONE HYDROCHLORIDE 5 MG: 5 TABLET ORAL at 08:37

## 2018-09-09 RX ADMIN — Medication 10 ML: at 20:31

## 2018-09-09 RX ADMIN — CELECOXIB 200 MG: 100 CAPSULE ORAL at 20:31

## 2018-09-09 RX ADMIN — DOCUSATE SODIUM 100 MG: 100 CAPSULE, LIQUID FILLED ORAL at 20:31

## 2018-09-09 ASSESSMENT — PAIN DESCRIPTION - DESCRIPTORS
DESCRIPTORS: PRESSURE

## 2018-09-09 ASSESSMENT — PAIN DESCRIPTION - ONSET
ONSET: ON-GOING

## 2018-09-09 ASSESSMENT — PAIN DESCRIPTION - PROGRESSION
CLINICAL_PROGRESSION: NOT CHANGED
CLINICAL_PROGRESSION: GRADUALLY IMPROVING
CLINICAL_PROGRESSION: NOT CHANGED

## 2018-09-09 ASSESSMENT — PAIN DESCRIPTION - LOCATION
LOCATION: HIP

## 2018-09-09 ASSESSMENT — PAIN SCALES - GENERAL
PAINLEVEL_OUTOF10: 0
PAINLEVEL_OUTOF10: 2
PAINLEVEL_OUTOF10: 0
PAINLEVEL_OUTOF10: 4
PAINLEVEL_OUTOF10: 0
PAINLEVEL_OUTOF10: 1
PAINLEVEL_OUTOF10: 4
PAINLEVEL_OUTOF10: 7
PAINLEVEL_OUTOF10: 4
PAINLEVEL_OUTOF10: 0
PAINLEVEL_OUTOF10: 7
PAINLEVEL_OUTOF10: 2

## 2018-09-09 ASSESSMENT — PAIN DESCRIPTION - FREQUENCY
FREQUENCY: CONTINUOUS

## 2018-09-09 ASSESSMENT — PAIN DESCRIPTION - ORIENTATION
ORIENTATION: RIGHT

## 2018-09-09 ASSESSMENT — PAIN DESCRIPTION - PAIN TYPE
TYPE: SURGICAL PAIN

## 2018-09-09 NOTE — PROGRESS NOTES
Physical Therapy  Facility/Department: [facility] 5T ORTHO/NEURO  Daily Treatment Note  NAME: Sarahi Álvarez  : 1945  MRN: [id number]    Date of Service: 2018    Discharge Recommendations:    Sarahi Álvarez scored a 18/24 on the AM-PAC short mobility form. Current research shows that an AM-PAC score of 18 or greater is typically associated with a discharge to the patient's home setting. Based on the patients AM-PAC score and their current functional mobility deficits, it is recommended that the patient have 2-3 sessions per week of Physical Therapy at d/c to increase the patients independence. Patient Diagnosis(es): The primary encounter diagnosis was Primary osteoarthritis of both hips. Diagnoses of Primary osteoarthritis of right hip, Osteonecrosis of right hip ([address] and Osteoarthritis of right hip, unspecified osteoarthritis type were also pertinent to this visit. has a past medical history of DDD (degenerative disc disease), lumbar; Depression; Hypothyroidism; and Low back pain. has a past surgical history that includes Testicle surgery () and Colonoscopy. Restrictions  Position Activity Restriction  Other position/activity restrictions: FWBAT; Posterolateral Approach  Subjective   General  Chart Reviewed: Yes  Additional Pertinent Hx: Pt is a 81 y.o. male adm s/p RIGHT TOTAL HIP ARTHROPLASTY on . PMH:  DDD, depression, hypothyroidism  Referring Practitioner: Sonja Angelucci, MD  Subjective  Subjective: Pt up in chair and agreeable to PT intervention. pt recited 3/3 hip precautions. Denies pain.           Orientation     Objective   Bed mobility  Comment: NT- up in chair before and post session  Transfers  Sit to Stand: Stand by assistance  Stand to sit: Stand by assistance  Comment: Hand placement cues   Ambulation  Ambulation?: Yes  Ambulation 1  Surface: level tile  Device: Rolling Walker  Assistance: Stand by assistance  Quality of Gait: Partial step through gait, WBAT in chair     Therapy Time   Individual Concurrent Group Co-treatment   Time In 0927         Time Out 1006         Minutes 44              Second Session Therapy Time:   Individual Concurrent Group Co-treatment   Time In 1230         Time Out 1308         Minutes 38           Timed Code Treatment Minutes:  77    Total Treatment Minutes:  77  If patient is discharged prior to next treatment, this note will serve as the discharge summary.   Phil Truong, PT

## 2018-09-09 NOTE — PROGRESS NOTES
Occupational Therapy  Facility/Department: [facility] 5T ORTHO/NEURO  Daily Treatment Note  NAME: Dionne Capps  : 1945  MRN: [id number]    Date of Service: 2018    Discharge Recommendations: Dionne Capps scored a 21/24 on the AM-PAC ADL Inpatient form. Current research shows that an AM-PAC score of 18 or greater is typically associated with a discharge to the patient's home setting. Based on the patients AM-PAC score and their current ADL deficits, it is recommended that the patient have 2-3 sessions per week of Occupational Therapy at d/c to increase the patients independence. OT Equipment Recommendations  ADL Assistive Devices: Shower Chair with back; Reacher;Sock-Aid Soft;Long-handled Sponge;Long-handled Shoe Horn;Toileting - Raised Toilet Seat with arms    Patient Diagnosis(es): The primary encounter diagnosis was Primary osteoarthritis of both hips. Diagnoses of Primary osteoarthritis of right hip, Osteonecrosis of right hip ([address] and Osteoarthritis of right hip, unspecified osteoarthritis type were also pertinent to this visit. has a past medical history of DDD (degenerative disc disease), lumbar; Depression; Hypothyroidism; and Low back pain. has a past surgical history that includes Testicle surgery () and Colonoscopy. Restrictions  Position Activity Restriction  Other position/activity restrictions: FWBAT; Posterolateral Approach  Subjective   General  Chart Reviewed: Yes  Additional Pertinent Hx: 81 y.o. M to OR  for R THR (posterior approach). PMH:  DDD Lumbar, Depression, Hypothyroid, Low Back Pain. Family / Caregiver Present: No  Referring Practitioner: Dr. Alejandrina Sheriff  Diagnosis: Osteonecrosis R Hip  Subjective  Subjective: Pt sitting up in chair upon entry; agreeable to OT.      Pain Assessment  Patient Currently in Pain:  (endorses soreness, but no real pain )    Orientation  Orientation  Overall Orientation Status: Within Normal Limits     Objective    Treatment included functional transfer training, ADLs, and patient education. ADL  Feeding: Independent  Grooming: Independent (standing at sink to wash hands, brush teeth)  LE Dressing: Setup;Supervision; Increased time to complete;Verbal cueing (seated to don socks w/ sock aide; min cues to avoid hip flexion past 90 deg)        Balance  Sitting Balance: Independent  Standing Balance: Stand by assistance  Standing Balance  Activity: functional mobility in room, restroom, hallway; standing for ADLs, transfers   Sit to stand: Stand by assistance  Stand to sit: Stand by assistance  Functional Mobility  Functional - Mobility Device: Rolling Walker  Assist Level: Stand by assistance  Functional Mobility Comments: Pt tends to slightly pick walker up when turning, says it is to avoid twisting     Transfers  Sit to stand: Stand by assistance  Stand to sit: Stand by assistance  Cognition  Overall Cognitive Status: [facility]            Assessment   Performance deficits / Impairments: Decreased functional mobility ; Decreased ADL status  Assessment: Pt demos improved activity tolerance/transfers from previous session. Pt continues to require min cues for good recall of hip precautions. Pt is hopeful for SNF at d/c. Will continue to follow. Treatment Diagnosis: impaired ADLs and transfers s/p R THR  Patient Education: D/c planning, hip precautions- pt verb understanding  REQUIRES OT FOLLOW UP: Yes  Activity Tolerance  Activity Tolerance: Patient Tolerated treatment well  Safety Devices  Safety Devices in place: Yes  Type of devices: Left in chair;Nurse notified;Call light within reach; Chair alarm in place          Plan   Plan  Times per week: 7  Times per day: Daily  Current Treatment Recommendations: Balance Training, Functional Mobility Training, Safety Education & Training, Self-Care / ADL  If patient discharges prior to next treatment, this note will serve as discharge summary.  Will continue per POC if patient does not discharge.     AM-PAC Score        AM-PAC Inpatient Daily Activity Raw Score: 21  AM-PAC Inpatient ADL T-Scale Score : 44.27  ADL Inpatient CMS 0-100% Score: 32.79  ADL Inpatient CMS G-Code Modifier : CJ    Goals  Short term goals  Time Frame for Short term goals: Discharge  Short term goal 1: 3in1 transfer with SBA - goal not met  Short term goal 2: LB dressing using AE and SBA - goal met 9/8  Short term goal 3: independent knowledge of safe shower stall transfer technique - goal not met  Short term goal 4: verbalize posterior hip precautions w/ no cues - goal met 9/8  Patient Goals   Patient goals : to discharge to SNF to be more independent prior to discharge home (pt/wife babysit and there are multiple tripping hazards per pt)       Therapy Time   Individual Concurrent Group Co-treatment   Time In 0743         Time Out 0810         Minutes 27         Timed Code Treatment Minutes: [address] [facility], OT

## 2018-09-09 NOTE — PROGRESS NOTES
Patient alert and oriented x4, VSS, neuro checks WNL. Patient denies numbness or tingling to RLE, prevena wound vac in place without any drainage. Pt ambulates with x1 assist and the walker. Bed alarm on for safety, call light within reach. Will continue to monitor.

## 2018-09-09 NOTE — PROGRESS NOTES
Lab Results   Component Value Date    WBC 6.8 08/28/2018    RBC 4.08 08/28/2018    HGB 9.3 09/09/2018    HCT 27.0 09/09/2018    MCV 93.7 08/28/2018    MCH 31.6 08/28/2018    MCHC 33.8 08/28/2018    RDW 13.7 08/28/2018     09/09/2018    MPV 7.6 08/28/2018     CMP:    Lab Results   Component Value Date     08/28/2018    K 4.3 08/28/2018     08/28/2018    CO2 25 08/28/2018    BUN 26 08/28/2018    CREATININE 0.8 08/28/2018    GFRAA >60 08/28/2018    GFRAA >60 03/05/2013    AGRATIO 1.5 03/05/2013    LABGLOM >60 08/28/2018    GLUCOSE 102 08/28/2018    PROT 7.6 03/05/2013    LABALBU 4.6 03/05/2013    CALCIUM 9.3 08/28/2018    BILITOT 0.50 03/05/2013    ALKPHOS 53 03/05/2013    AST 22 03/05/2013    ALT 23 03/05/2013         ASSESSMENT AND PLAN      Patient Active Hospital Problem List:   Osteonecrosis of right hip ([facility] [address] (9/6/2018)    Assessment: Stable post op    Plan:  Per orhtopedics   Hypothyroidism ()    Assessment: Stabloe    Plan: This problem is stable. Continue present medications. Blood loss anemia (9/8/2018)    Assessment: Stable    Plan:   Will start iron orally once bowel function returns

## 2018-09-09 NOTE — PLAN OF CARE
Problem: Pain:  Goal: Control of acute pain  Control of acute pain   Outcome: Ongoing  Pt complains of pressure like pain to right hip/surgical site. PRN pain medication administered per request and doctors orders. Pt in bed sleeping, no signs of distress. Will continue to monitor. Problem: Falls - Risk of:  Goal: Will remain free from falls  Will remain free from falls   Outcome: Ongoing  Patient high fall risk and is up with assist x1 and the walker. Patient alert and oriented x4, non skid socks on, bed in lowest position and locked, side rails up x2, call light and belongings within reach, bed alarm on for safety, fall sign posted. Will continue to monitor.

## 2018-09-09 NOTE — FLOWSHEET NOTE
09/09/18 1324   Encounter Summary   Services provided to: Patient   Volunteer Visit (9/9 [address] [place])   Sacraments   Communion Patient received communion

## 2018-09-10 VITALS
OXYGEN SATURATION: 99 % | RESPIRATION RATE: 16 BRPM | WEIGHT: 200 LBS | BODY MASS INDEX: 31.39 KG/M2 | HEIGHT: 67 IN | HEART RATE: 67 BPM | DIASTOLIC BLOOD PRESSURE: 74 MMHG | SYSTOLIC BLOOD PRESSURE: 146 MMHG | TEMPERATURE: 97.7 F

## 2018-09-10 LAB — PLATELET # BLD: 284 K/UL (ref 135–450)

## 2018-09-10 PROCEDURE — 97116 GAIT TRAINING THERAPY: CPT

## 2018-09-10 PROCEDURE — 6360000002 HC RX W HCPCS: Performed by: ORTHOPAEDIC SURGERY

## 2018-09-10 PROCEDURE — 85049 AUTOMATED PLATELET COUNT: CPT

## 2018-09-10 PROCEDURE — 6370000000 HC RX 637 (ALT 250 FOR IP): Performed by: ORTHOPAEDIC SURGERY

## 2018-09-10 PROCEDURE — 99231 SBSQ HOSP IP/OBS SF/LOW 25: CPT | Performed by: INTERNAL MEDICINE

## 2018-09-10 PROCEDURE — 6370000000 HC RX 637 (ALT 250 FOR IP): Performed by: INTERNAL MEDICINE

## 2018-09-10 PROCEDURE — 2580000003 HC RX 258: Performed by: ORTHOPAEDIC SURGERY

## 2018-09-10 PROCEDURE — 97110 THERAPEUTIC EXERCISES: CPT

## 2018-09-10 PROCEDURE — 36415 COLL VENOUS BLD VENIPUNCTURE: CPT

## 2018-09-10 RX ORDER — FERROUS SULFATE 325(65) MG
325 TABLET ORAL EVERY OTHER DAY
Status: DISCONTINUED | OUTPATIENT
Start: 2018-09-10 | End: 2018-09-10 | Stop reason: HOSPADM

## 2018-09-10 RX ADMIN — LEVOTHYROXINE SODIUM 50 MCG: 50 TABLET ORAL at 09:04

## 2018-09-10 RX ADMIN — OXYCODONE HYDROCHLORIDE 5 MG: 5 TABLET ORAL at 05:54

## 2018-09-10 RX ADMIN — Medication 10 ML: at 09:04

## 2018-09-10 RX ADMIN — DOCUSATE SODIUM 100 MG: 100 CAPSULE, LIQUID FILLED ORAL at 09:04

## 2018-09-10 RX ADMIN — CELECOXIB 200 MG: 100 CAPSULE ORAL at 09:04

## 2018-09-10 RX ADMIN — FERROUS SULFATE TAB 325 MG (65 MG ELEMENTAL FE) 325 MG: 325 (65 FE) TAB at 09:59

## 2018-09-10 RX ADMIN — ENOXAPARIN SODIUM 40 MG: 40 INJECTION SUBCUTANEOUS at 09:04

## 2018-09-10 ASSESSMENT — PAIN SCALES - GENERAL
PAINLEVEL_OUTOF10: 0
PAINLEVEL_OUTOF10: 2
PAINLEVEL_OUTOF10: 4
PAINLEVEL_OUTOF10: 0

## 2018-09-10 ASSESSMENT — PAIN DESCRIPTION - ONSET: ONSET: ON-GOING

## 2018-09-10 ASSESSMENT — PAIN DESCRIPTION - FREQUENCY: FREQUENCY: CONTINUOUS

## 2018-09-10 ASSESSMENT — PAIN DESCRIPTION - PAIN TYPE: TYPE: SURGICAL PAIN

## 2018-09-10 ASSESSMENT — PAIN DESCRIPTION - LOCATION: LOCATION: HIP

## 2018-09-10 ASSESSMENT — PAIN DESCRIPTION - DESCRIPTORS: DESCRIPTORS: PRESSURE

## 2018-09-10 ASSESSMENT — PAIN DESCRIPTION - PROGRESSION: CLINICAL_PROGRESSION: NOT CHANGED

## 2018-09-10 ASSESSMENT — PAIN DESCRIPTION - ORIENTATION: ORIENTATION: RIGHT

## 2018-09-10 NOTE — PROGRESS NOTES
Patient alert and oriented x4, VSS, neuro checks WNL, surgical dressing remains CD&I. Wound vac removed 9/9, dry dressing in place to right hip. Patient ambulates well with SBA x1 and the walker. Call light and belongings within reach, bed alarm on for safety. Will continue to monitor.

## 2018-09-10 NOTE — PLAN OF CARE
Problem: Pain:  Goal: Control of acute pain  Control of acute pain   Outcome: Ongoing  Pt complains of pain to RLE/surgical site that feels like pressure. PRN pain medication administered when necessary by request and doctors orders. Pt asleep in bed. Will continue to monitor. Problem: Falls - Risk of:  Goal: Will remain free from falls  Will remain free from falls   Outcome: Ongoing  Patient high fall risk and is up with assist x1 and the walker. Patient alert and oriented x4, non skid socks on, bed in lowest position and locked, side rails up x2, call light and belongings within reach, bed alarm on for safety, fall sign posted. Will continue to monitor.

## 2018-09-10 NOTE — PROGRESS NOTES
Physical Therapy  Facility/Department: [facility] 5T ORTHO/NEURO  Daily Treatment Note  NAME: Deepak Ramirez  : 1945  MRN: [id number]    Date of Service: 9/10/2018    Discharge Recommendations:    Deepak Ramirez scored a 18/24 on the AM-PAC short mobility form. Current research shows that an AM-PAC score of 18 or greater is typically associated with a discharge to the patient's home setting. Based on the patients AM-PAC score and their current functional mobility deficits, it is recommended that the patient have 2-3 sessions per week of Physical Therapy at d/c to increase the patients independence. PT Equipment Recommendations  Other: defer to next level of care    Patient Diagnosis(es): The primary encounter diagnosis was Primary osteoarthritis of both hips. Diagnoses of Primary osteoarthritis of right hip, Osteonecrosis of right hip ([address] and Osteoarthritis of right hip, unspecified osteoarthritis type were also pertinent to this visit. has a past medical history of DDD (degenerative disc disease), lumbar; Depression; Hypothyroidism; and Low back pain. has a past surgical history that includes Testicle surgery () and Colonoscopy. Restrictions  Position Activity Restriction  Other position/activity restrictions: FWBAT; Posterolateral Approach  Subjective   General  Chart Reviewed: Yes  Additional Pertinent Hx: Pt is a 81 y.o. male adm s/p RIGHT TOTAL HIP ARTHROPLASTY on . PMH:  DDD, depression, hypothyroidism  Family / Caregiver Present: No  Referring Practitioner: Julia Lawson MD  Subjective  Subjective: Pt up in chair and agreeable to PT intervention. pt recited 3/3 hip precautions. Denies pain.   Pain Screening  Patient Currently in Pain: Denies  Vital Signs  Patient Currently in Pain: Denies       Orientation  Orientation  Overall Orientation Status: Within Functional Limits  Objective   Bed mobility  Supine to Sit: Supervision (HOB slightly raised, no use of rail )  Transfers  Sit to Stand: Stand by assistance (from EOB)  Stand to sit: Stand by assistance (to chair)  Ambulation  Ambulation?: Yes  WB Status: FWBAT RLE  Ambulation 1  Surface: level tile  Device: Rolling Walker  Assistance: Stand by assistance (progressing to supervision )  Quality of Gait: reciprocal gait pattern, multiple steps to turn to maintain post hip precautions, moderate alis and step length; steady gait with RW  Distance: >500'   Comments: no rest breaks needed  Stairs/Curb  Stairs?: No     Balance  Posture: Good  Sitting - Static: Good  Sitting - Dynamic: Good  Standing - Static: Good (with RW)  Standing - Dynamic: Good (with RW)  Exercises  Quad Sets: independent x 10 reps BLE   Heelslides: independent x 10 reps RLE  Gluteal Sets: independent x 10 reps B LE   Hip Abduction:independent x 10 reps R LE   Knee Long Arc Quad: independent x 10 reps RLE  Ankle Pumps: independent x 10 reps BLE        Assessment   Body structures, Functions, Activity limitations: Decreased functional mobility ; Decreased ROM; Decreased strength  Assessment: Pt making good progress with functional mobility requiring SBA-supervision for bed mobility, transfers and amb. Pt consistently able to verbalize and maintain post. Hip precautions. Pt amb >500 ft with RW without need for rest breaks. Pt planning to d/c to SNF today. Will continue to follow. Treatment Diagnosis: impaired functional mobility s/p R THR  Prognosis: Good  Patient Education: Discussed d/c planning and reviewd post hip precautions. Pt verbalized understanding.    Barriers to Learning: none  REQUIRES PT FOLLOW UP: Yes  Activity Tolerance  Activity Tolerance: Patient Tolerated treatment well     G-Code     OutComes Score     AM-PAC Score  AM-PAC Inpatient Mobility Raw Score : 18  AM-PAC Inpatient T-Scale Score : 43.63  Mobility Inpatient CMS 0-100% Score: 46.58  Mobility Inpatient CMS G-Code Modifier : CK          Goals  Short term goals  Time Frame for Short

## 2018-09-10 NOTE — PROGRESS NOTES
Hospital Medicine  Progress Note    Chief Complaint: Follw up surgery. SUBJECTIVE: Patient is improved. There are not new complaints Pain controlled    OBJECTIVE      Medications    Infusion Medications    dextrose 5% and 0.45% NaCl with KCl 20 mEq 1,000 mL (18 1605)     Scheduled Medications    levothyroxine  50 mcg Oral Daily    sodium chloride flush  10 mL Intravenous 2 times per day    docusate sodium  100 mg Oral BID    enoxaparin  40 mg Subcutaneous Daily    celecoxib  200 mg Oral BID       Physical   Temperature:  Current - Temp: 98.2 °F (36.8 °C); Max - Temp  Av °F (36.7 °C)  Min: 97.7 °F (36.5 °C)  Max: 98.3 °F (36.8 °C)    Respiratory Rate : Resp  Av.3  Min: 15  Max: 17    Pulse Range: Pulse  Av.3  Min: 63  Max: 76    Blood Presuure Range:  Systolic (47UXK), XWN:808 , Min:117 , STV:232   ; Diastolic (16VMX), KMI:62, Min:66, Max:84      Pulse ox Range: SpO2  Av.2 %  Min: 95 %  Max: 99 %    24hr I & O:    Intake/Output Summary (Last 24 hours) at 09/10/18 3320  Last data filed at 09/10/18 0350   Gross per 24 hour   Intake              600 ml   Output              650 ml   Net              -50 ml       Constitutional:  awake, alert, cooperative, no apparent distress, and appears stated age  Eyes:  pupils equal, round and reactive to light  ENT:  normocepalic, without obvious abnormality  NECK:  supple, symmetrical, trachea midline  HEMATOLOGIC/LYMPHATICS:  no cervical lymphadenopathy  LUNGS:  No increased work of breathing, good air exchange, clear to auscultation bilaterally, no crackles or wheezing  CARDIOVASCULAR: regular rate and rhythm, S1, S2 normal, no murmur, click, rub or gallop  ABDOMEN:  soft, non-tender, without masses or organomegaly  MUSCULOSKELETAL:  There is no redness, warmth, or swelling of the joints. Full range of motion noted. Motor strength is 5 out of 5 all extremities bilaterally.   Tone is normal.  SKIN:  normal skin color, texture, turgor    Data CBC:   Lab Results   Component Value Date    WBC 6.8 08/28/2018    RBC 4.08 08/28/2018    HGB 9.3 09/09/2018    HCT 27.0 09/09/2018    MCV 93.7 08/28/2018    MCH 31.6 08/28/2018    MCHC 33.8 08/28/2018    RDW 13.7 08/28/2018     09/10/2018    MPV 7.6 08/28/2018     CMP:    Lab Results   Component Value Date     08/28/2018    K 4.3 08/28/2018     08/28/2018    CO2 25 08/28/2018    BUN 26 08/28/2018    CREATININE 0.8 08/28/2018    GFRAA >60 08/28/2018    GFRAA >60 03/05/2013    AGRATIO 1.5 03/05/2013    LABGLOM >60 08/28/2018    GLUCOSE 102 08/28/2018    PROT 7.6 03/05/2013    LABALBU 4.6 03/05/2013    CALCIUM 9.3 08/28/2018    BILITOT 0.50 03/05/2013    ALKPHOS 53 03/05/2013    AST 22 03/05/2013    ALT 23 03/05/2013         ASSESSMENT AND PLAN      Patient Active Hospital Problem List:   Osteonecrosis of right hip ([facility] [address] (9/6/2018)    Assessment: Stable post op    Plan: PT,Lovenox, IS   Hypothyroidism ()    Assessment: Stable    Plan: This problem is stable. Continue present medications.      Blood loss anemia (9/8/2018)    Assessment: Stable    Plan: Oral iron

## 2018-09-10 NOTE — PLAN OF CARE
Problem: Pain:  Goal: Pain level will decrease  Pain level will decrease   Outcome: Ongoing  Patient denies pain at this time. Will continue to monitor. Problem: Falls - Risk of:  Goal: Will remain free from falls  Will remain free from falls   Outcome: Ongoing  Patient has remained free from falls. Bed is low and locked, bed alarm on, side rails up 2/4, non skid socks on patient. Call light and bedside table are within reach. Patient calls out appropriately. Will continue to monitor.

## 2018-09-28 ENCOUNTER — HOSPITAL ENCOUNTER (OUTPATIENT)
Dept: PHYSICAL THERAPY | Age: 73
Setting detail: THERAPIES SERIES
Discharge: HOME | End: 2018-09-28
Payer: MEDICARE

## 2018-09-28 PROCEDURE — G8979 MOBILITY GOAL STATUS: HCPCS

## 2018-09-28 PROCEDURE — 97162 PT EVAL MOD COMPLEX 30 MIN: CPT

## 2018-09-28 PROCEDURE — 97530 THERAPEUTIC ACTIVITIES: CPT

## 2018-09-28 PROCEDURE — G8978 MOBILITY CURRENT STATUS: HCPCS

## 2018-09-28 PROCEDURE — 97110 THERAPEUTIC EXERCISES: CPT

## 2018-09-28 NOTE — PROGRESS NOTES
Physical Therapy  Initial Assessment  Date: 2018  Patient Name: Kendra Brown  MRN: [id number]  : 1945     Treatment Diagnosis: Decreased functional mobility post right THR    Restrictions  Position Activity Restriction  Other position/activity restrictions: avoid flexion >90, IR, adduction. Subjective   General  Chart Reviewed: Yes  Additional Pertinent Hx: DDD, depression, hypothyroid  Referring Practitioner: Melba Pink MD   visit 10/25  Referral Date : 18  Diagnosis: right THR  PT Visit Information  Onset Date: 18  PT Insurance Information: Anthem golden medicare  Subjective  Subjective: Right THR posterolateral approach , on , discharged to [facility], spent 5 days there. had home PT for 6 visits. Pt left ankle has been a chronic problem, increased pain since Right THR - scheduled for triple arthrodesis in December. Pain Screening  Patient Currently in Pain: Yes (minimal right hip ache left foot is more of the issue now.)  Vital Signs  Patient Currently in Pain: Yes (minimal right hip ache left foot is more of the issue now.)    Vision/Hearing  Vision  Vision: Within Functional Limits (glasses)  Hearing  Hearing: Within functional limits    Orientation  Orientation  Overall Orientation Status: Within Normal Limits    Social/Functional History  Social/Functional History  Lives With: Spouse  Type of Home: House  Home Layout: One level  Home Access: Stairs to enter with rails (4 JONA)  Bathroom Toilet: Handicap height  Bathroom Equipment: Shower chair;Grab bars in shower; Toilet raiser;Grab bars around toilet  Bathroom Accessibility: Accessible  Home Equipment: Rolling walker; [address] Help From: Family  ADL Assistance: Independent  Homemaking Assistance: Independent  Ambulation Assistance: Independent  Transfer Assistance: Independent  Active : Yes  Occupation: Retired  Objective     Observation/Palpation  Observation: left ankle brace,

## 2018-10-01 ENCOUNTER — HOSPITAL ENCOUNTER (OUTPATIENT)
Dept: PHYSICAL THERAPY | Age: 73
Setting detail: THERAPIES SERIES
Discharge: HOME | End: 2018-10-01
Payer: MEDICARE

## 2018-10-01 PROCEDURE — 97110 THERAPEUTIC EXERCISES: CPT

## 2018-10-01 NOTE — FLOWSHEET NOTE
with gait on level and in yard. Patient Requires Follow-up: [x] Yes  [] No    Plan:   [] Continue per plan of care [] Alter current plan (see comments)  [x] Plan of care initiated [] Hold pending MD visit [] Discharge    Plan for Next Session: add reebok/ side step if L ankle job.     Electronically signed by:  Heather Kwok, PTA   [id number]

## 2018-10-02 ENCOUNTER — OFFICE VISIT (OUTPATIENT)
Dept: ORTHOPEDIC SURGERY | Age: 73
End: 2018-10-02
Payer: MEDICARE

## 2018-10-02 ENCOUNTER — APPOINTMENT (OUTPATIENT)
Dept: PHYSICAL THERAPY | Age: 73
End: 2018-10-02
Payer: MEDICARE

## 2018-10-02 VITALS
SYSTOLIC BLOOD PRESSURE: 134 MMHG | HEART RATE: 80 BPM | WEIGHT: 210 LBS | BODY MASS INDEX: 33.75 KG/M2 | DIASTOLIC BLOOD PRESSURE: 73 MMHG | HEIGHT: 66 IN

## 2018-10-02 DIAGNOSIS — G89.29 CHRONIC PAIN OF LEFT ANKLE: Primary | ICD-10-CM

## 2018-10-02 DIAGNOSIS — M21.41 PES PLANUS OF BOTH FEET: ICD-10-CM

## 2018-10-02 DIAGNOSIS — M25.572 CHRONIC PAIN OF LEFT ANKLE: Primary | ICD-10-CM

## 2018-10-02 DIAGNOSIS — M19.072 PRIMARY OSTEOARTHRITIS OF LEFT FOOT: ICD-10-CM

## 2018-10-02 DIAGNOSIS — M21.42 PES PLANUS OF BOTH FEET: ICD-10-CM

## 2018-10-02 DIAGNOSIS — M76.829 POSTERIOR TIBIALIS TENDON INSUFFICIENCY: ICD-10-CM

## 2018-10-02 PROBLEM — M21.40 FLAT FOOT: Status: ACTIVE | Noted: 2018-10-02

## 2018-10-02 PROCEDURE — 99214 OFFICE O/P EST MOD 30 MIN: CPT | Performed by: FAMILY MEDICINE

## 2018-10-02 RX ORDER — IBUPROFEN 800 MG/1
800 TABLET ORAL EVERY 8 HOURS PRN
Qty: 90 TABLET | Refills: 3 | Status: SHIPPED | OUTPATIENT
Start: 2018-10-02

## 2018-10-02 RX ORDER — OXYCODONE AND ACETAMINOPHEN 10; 325 MG/1; MG/1
TABLET ORAL
COMMUNITY
End: 2022-05-31

## 2018-10-02 NOTE — PROGRESS NOTES
Chief Complaint  Foot Pain (OPNP LEFT FOOT)    2nd opinion consultation chronic left ankle and foot pain with altalgia    History of Present Illness:  Musa Posey is a 81 y.o. male  who is a retired white male who is a former  at [employer] who also work for the [employer] who is a patient of Dr. Bala Espinoza who is being seen today for 2nd opinion consultation regarding chronic left foot and ankle pain. He states that he has had a long-standing 20-30 year history of pain in his left foot and ankle. He is seen numerous physicians for this and is currently being treated by Dr. Tray Arguello at [facility].  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 He did have an MRI showing moderate to severe degenerative changes about the hindfoot and midfoot with chronic tenosynovitis and posterior impingement as well. He has not been consistently taking his anti-inflammatories although he has been given pain medications in the past.  He does have pain both at rest and at night and complains of pain in the range of 6-9 out of 10 with standing and walking. He has not been utilizing his boot. He does occasionally utilize orthotics. He has tried numerous rounds of bracing which are no longer effective for him and fell and therapy. He is wondering whether or not definitive surgical intervention is his best alternative prompting today's visit. He is being seen today for orthopedic and sports 2nd opinion consultation and review of his most recent MRI of his foot and ankle from 6/9/2018. Medical History  Patient's medications, allergies, past medical, surgical, social and family histories were reviewed and updated as appropriate.     Review of Systems  Pertinent items are noted in HPI  Review of systems reviewed from Patient ibuprofen 800 mg 1 pill twice daily. He is now 4 weeks out from a hip replacement on the right by Dr. Juliet Slaughter and we'll check to see if potentially a Medrol Wolf might be helpful. He may wish to use his walking boot on the left with the contralateral even up lift on his right foot. Icing and activity modification and continue rehabilitation for his right total hip arthroplasty was recommended. I think we can see him back as needed and I would follow-up as scheduled with Dr. Ubaldo Ordoñez for his left foot triple arthrodesis and tendon lengthening in December 2019. Icing and activity modification was discussed. Use of his pain medications with caution also discussed. He will contact us with questions or concerns. This dictation was performed with a verbal recognition program (DRAGON) and it was checked for errors. It is possible that there are still dictated errors within this office note. If so, please bring any errors to my attention for an addendum. All efforts were made to ensure that this office note is accurate.

## 2018-10-03 ENCOUNTER — HOSPITAL ENCOUNTER (OUTPATIENT)
Dept: PHYSICAL THERAPY | Age: 73
Setting detail: THERAPIES SERIES
Discharge: HOME | End: 2018-10-03
Payer: MEDICARE

## 2018-10-03 PROCEDURE — 97110 THERAPEUTIC EXERCISES: CPT

## 2018-10-03 NOTE — FLOWSHEET NOTE
Amb with cane post PT. Exercise/Equipment Resistance/Repetitions Other comments     Right THR 9/6/18 post/lat   Nustep 6 min level 1 Left ankle OA   GSS incline 20 sec x 3      R only No flex>90, no ADD, no IR   gumdrops  agg L ankle   Standing abd  Standing flexion  Standing extension  Side board - stand on right slide left 0# x 10 R/L  0# x 10 R/L  0# x 10 R/L  X 10          Fitter abd only , f/b 1 thin x 10 each R    Step ups  Steps lat  Steps down 4\" x 10 R  4\" x 10 R           Leg Press add    MHE  Abd, flex add     Knee ext - green 10#   x 10 B agg R knee, decreased wt 10# still agg R knee, therefore D/C        11 bars  reebok S/S  Side step   1 min  10 ' x 4                   Supine  SLR  Clamshell - 1 pillows   2 x 10 R  X 10 R   Ind         Gait To PT w/o AD today Demo good pattern. CP R hip  Pt declined     Other Therapeutic Activities:  Pt was educated on PT POC, Diagnosis, Prognosis, pathomechanics as well as frequency and duration of scheduling future physical therapy appointments. Time was also taken on this day to answer all patient questions and participation in PT. Reviewed appointment policy in detail with patient and patient verbalized understanding. Home Exercise Program:  Patient instructed in the following for HEP:   Patient verbalized/demonstrated understanding and was issued written HEP. Timed Code Treatment Minutes:  35    Total Treatment Minutes:  35    Treatment/Activity Tolerance:  [] Patient tolerated treatment well [] Patient limited by fatigue  [] Patient limited by pain  [] Patient limited by other medical complications  [] Other:     Prognosis: [x] Good [] Fair  [] Poor    Goals:    Short term goals  Time Frame for Short term goals: Discharge  Short term goal 1: Indep with HEP  Short term goal 2: Decrease pain to 0-1/occasional right hip to allow normal self care, light ADL  Short term goal 3:  Increase left hip strength to 4/5 to normalize gait pattern and

## 2018-10-05 ENCOUNTER — HOSPITAL ENCOUNTER (OUTPATIENT)
Dept: PHYSICAL THERAPY | Age: 73
Setting detail: THERAPIES SERIES
Discharge: HOME | End: 2018-10-05
Payer: MEDICARE

## 2018-10-05 PROCEDURE — 97110 THERAPEUTIC EXERCISES: CPT

## 2018-10-05 PROCEDURE — 97530 THERAPEUTIC ACTIVITIES: CPT

## 2018-10-08 ENCOUNTER — HOSPITAL ENCOUNTER (OUTPATIENT)
Dept: PHYSICAL THERAPY | Age: 73
Setting detail: THERAPIES SERIES
Discharge: HOME | End: 2018-10-08
Payer: MEDICARE

## 2018-10-08 PROCEDURE — 97110 THERAPEUTIC EXERCISES: CPT

## 2018-10-10 ENCOUNTER — HOSPITAL ENCOUNTER (OUTPATIENT)
Dept: PHYSICAL THERAPY | Age: 73
Setting detail: THERAPIES SERIES
Discharge: HOME | End: 2018-10-10
Payer: MEDICARE

## 2018-10-10 PROCEDURE — 97110 THERAPEUTIC EXERCISES: CPT | Performed by: CHIROPRACTOR

## 2018-10-12 ENCOUNTER — HOSPITAL ENCOUNTER (OUTPATIENT)
Dept: PHYSICAL THERAPY | Age: 73
Setting detail: THERAPIES SERIES
Discharge: HOME | End: 2018-10-12
Payer: MEDICARE

## 2018-10-12 PROCEDURE — 97110 THERAPEUTIC EXERCISES: CPT

## 2018-10-12 NOTE — FLOWSHEET NOTE
term goal 4: 20 sec tandem bal r/l to allow good stability with gait on level and in yard.          Patient Requires Follow-up: [x] Yes  [] No    Plan:   [] Continue per plan of care [] Alter current plan (see comments)  [x] Plan of care initiated [] Hold pending MD visit [] Discharge    Plan for Next Session: Initiate sl abd as job    Electronically signed by:  Isaias Granados, [id number] [facility]

## 2018-10-15 ENCOUNTER — HOSPITAL ENCOUNTER (OUTPATIENT)
Dept: PHYSICAL THERAPY | Age: 73
Setting detail: THERAPIES SERIES
Discharge: HOME | End: 2018-10-15
Payer: MEDICARE

## 2018-10-15 NOTE — FLOWSHEET NOTE
Physical Therapy  Cancellation/  Patient Name:  Cathy Robles  :  1945   Date:  10/15/2018  Cancelled visits to date: 1  No-shows to date: 0    For today's appointment patient:  [x]  Cancelled  []  Rescheduled appointment  []  No-show     Reason given by patient:  []  Patient ill  []  Conflicting appointment  []  No transportation    []  Conflict with work  []  No reason given  []  Other:     Comments:      Electronically signed by:  Ina Curry

## 2018-10-17 ENCOUNTER — HOSPITAL ENCOUNTER (OUTPATIENT)
Dept: PHYSICAL THERAPY | Age: 73
Setting detail: THERAPIES SERIES
Discharge: HOME | End: 2018-10-17
Payer: MEDICARE

## 2018-10-17 PROCEDURE — 97110 THERAPEUTIC EXERCISES: CPT

## 2018-10-17 NOTE — FLOWSHEET NOTE
Physical Therapy Daily Treatment Note  Date:  10/17/2018    Patient Name:  Swetha Dumont    :  1945  MRN: [id number]    Restrictions/Precautions: Position Activity Restriction  Other position/activity restrictions: avoid flexion >90, IR, adduction. Pertinent Medical History: Additional Pertinent Hx: DDD, depression, hypothyroid    Medical/Treatment Diagnosis Information:  · Right THR 18  · Treatment Diagnosis: Decreased functional mobility post right THR    Insurance/Certification information:  PT Insurance Information: Anthem golden medicare  Physician Information:  Referring Practitioner: Dc Posey MD   visit 10/25  Plan of care signed (Y/N):  Sent     Visit# / total visits:    Pain level: 0/10     G-Code (if applicable):  Visit 1        LEFs  Score: 34  40-59%  Functional Limitation: Mobility: Walking and moving around  Mobility: Walking and Moving Around Current Status (): At least 40 percent but less than 60 percent impaired, limited or restricted  Mobility: Walking and Moving Around Goal Status (): At least 1 percent but less than 20 percent impaired, limited or restricted    History of Injury:  : Right THR posterolateral approach , on , discharged to [facility], spent 5 days there. had home PT for 6 visits. Pt left ankle has been a chronic problem, increased pain since Right THR - scheduled for triple arthrodesis in December. Subjective:  Feels like right hip is doing well, no pain. Chief c/o B feet, L>R. Reports still has knot around incision. Objective: - 10/12 - able to perform clamshell with yellow theraband. Posterior precautions,. Amb with cane post PT.    10/5 off cane + trendelenberg Right, SLS 1-2 sec on right.       Exercise/Equipment Resistance/Repetitions Other comments     Right THR 18 post/lat   Nustep 6 min level 1 Left ankle OA   GSS incline 20 sec x 3      R only   pt preferred B today No flex>90, no Continue per plan of care [] Alter current plan (see comments)  [x] Plan of care initiated [] Hold pending MD visit [] Discharge    Plan for Next Session: Initiate sl abd as job    Electronically signed by:  Nubia Roberto, PTA  [id number]

## 2018-10-19 ENCOUNTER — HOSPITAL ENCOUNTER (OUTPATIENT)
Dept: PHYSICAL THERAPY | Age: 73
Setting detail: THERAPIES SERIES
Discharge: HOME | End: 2018-10-19
Payer: MEDICARE

## 2018-10-19 PROCEDURE — 97110 THERAPEUTIC EXERCISES: CPT

## 2018-10-22 ENCOUNTER — HOSPITAL ENCOUNTER (OUTPATIENT)
Dept: PHYSICAL THERAPY | Age: 73
Setting detail: THERAPIES SERIES
Discharge: HOME | End: 2018-10-22
Payer: MEDICARE

## 2018-10-22 PROCEDURE — G8978 MOBILITY CURRENT STATUS: HCPCS

## 2018-10-22 PROCEDURE — 97530 THERAPEUTIC ACTIVITIES: CPT

## 2018-10-22 PROCEDURE — G8979 MOBILITY GOAL STATUS: HCPCS

## 2018-10-22 PROCEDURE — 97110 THERAPEUTIC EXERCISES: CPT

## 2018-10-22 NOTE — FLOWSHEET NOTE
Continue per plan of care [] Alter current plan (see comments)  [x] Plan of care initiated [] Hold pending MD visit [] Discharge    Plan for Next Session: continue to progress functional strength    Electronically signed by:  Gagandeep Jeffries, [id number]

## 2018-10-24 ENCOUNTER — APPOINTMENT (OUTPATIENT)
Dept: PHYSICAL THERAPY | Age: 73
End: 2018-10-24
Payer: MEDICARE

## 2018-10-26 ENCOUNTER — APPOINTMENT (OUTPATIENT)
Dept: PHYSICAL THERAPY | Age: 73
End: 2018-10-26
Payer: MEDICARE

## 2018-10-29 ENCOUNTER — APPOINTMENT (OUTPATIENT)
Dept: PHYSICAL THERAPY | Age: 73
End: 2018-10-29
Payer: MEDICARE

## 2018-10-31 ENCOUNTER — APPOINTMENT (OUTPATIENT)
Dept: PHYSICAL THERAPY | Age: 73
End: 2018-10-31
Payer: MEDICARE

## 2018-11-02 ENCOUNTER — APPOINTMENT (OUTPATIENT)
Dept: PHYSICAL THERAPY | Age: 73
End: 2018-11-02
Payer: MEDICARE

## 2018-11-05 ENCOUNTER — APPOINTMENT (OUTPATIENT)
Dept: PHYSICAL THERAPY | Age: 73
End: 2018-11-05
Payer: MEDICARE

## 2018-11-07 ENCOUNTER — APPOINTMENT (OUTPATIENT)
Dept: PHYSICAL THERAPY | Age: 73
End: 2018-11-07
Payer: MEDICARE

## 2018-11-09 ENCOUNTER — APPOINTMENT (OUTPATIENT)
Dept: PHYSICAL THERAPY | Age: 73
End: 2018-11-09
Payer: MEDICARE

## 2018-11-14 ENCOUNTER — APPOINTMENT (OUTPATIENT)
Dept: PHYSICAL THERAPY | Age: 73
End: 2018-11-14
Payer: MEDICARE

## 2018-11-16 ENCOUNTER — HOSPITAL ENCOUNTER (OUTPATIENT)
Dept: PHYSICAL THERAPY | Age: 73
Setting detail: THERAPIES SERIES
Discharge: HOME | End: 2018-11-16
Payer: MEDICARE

## 2018-11-16 PROCEDURE — 97110 THERAPEUTIC EXERCISES: CPT

## 2018-11-16 NOTE — FLOWSHEET NOTE
Physical Therapy Daily Treatment Note  Date:  2018    Patient Name:  Remedios Huizar    :  1945  MRN: [id number]    Restrictions/Precautions: Position Activity Restriction  Other position/activity restrictions: avoid flexion >90, IR, adduction. Pertinent Medical History: Additional Pertinent Hx: DDD, depression, hypothyroid    Medical/Treatment Diagnosis Information:  · Right THR 18  · Treatment Diagnosis: Decreased functional mobility post right THR    Insurance/Certification information:  PT Insurance Information: Anthem golden medicare  Physician Information:  Referring Practitioner: Jaylene Bah MD   visit 10/25  Plan of care signed (Y/N):  Sent     Visit# / total visits:   Pain level: 0/10     G-Code (if applicable):  Vist 10           LEFs  Score: 50  40-59%  Functional Limitation: Mobility: Walking and moving around  Mobility: Walking and Moving Around Current Status (): At least 40 percent but less than 60 percent impaired, limited or restricted  Mobility: Walking and Moving Around Goal Status (): At least 1 percent but less than 20 percent impaired, limited or restricted    History of Injury:  : Right THR posterolateral approach , on , discharged to [facility], spent 5 days there. had home PT for 6 visits. Pt left ankle has been a chronic problem, increased pain since Right THR - scheduled for triple arthrodesis in December. Subjective:  - was on vacation did a lot of walking/hiking did well. Did not have any soreness, despite the activity. Objective: - - able to perform sidelying abd. Posterior precautions,. Amb with cane post PT.    10/5 off cane + trendelenberg Right, SLS 1-2 sec on right.     Exercise/Equipment Resistance/Repetitions Other comments     Right THR 18 post/lat   Nustep 6 min level 1 Left ankle OA - fusion sched    GSS incline 20 sec x 3      R only   pt preferred B today No flex>90, no ADD, no IR  Precautions till Dec 6   Gumdrops upside down pavel stance 1 min    Right stance - star tap  SLS X 10 by rail for safety     one min R         Fitter abd only , f/b 1 thin  2 x 10 each R/L     Step ups  Steps lat  Steps down 8\" x 10 R  8 x 10 R  6\" x 15 R Practice in back stairs as steps are 7 -7.5 inches. Leg Press   60#  2  x 10  Seat at 10  30#  2 x 10  Right  Seat 10    MHE  Abd, flex 15# -  flex/ /abd x 10  R     Knee ext     Knee flexion   14 # 2 x 10 right   22#  2 x 10 right     11 bars  reebok S/S f/b  Side step  Fwd and back  Rocker board f/b, s/s   1 min ea  10 ' x  6 yellow TB  10' x 4 yellow TB  1 min each         S-L abd  2 x 10 2 # right. Reviewed importance of HEP with pt. Wall slides 10 against door. Other Therapeutic Activities:  Pt was educated on PT POC, Diagnosis, Prognosis, pathomechanics as well as frequency and duration of scheduling future physical therapy appointments. Time was also taken on this day to answer all patient questions and participation in PT. Reviewed appointment policy in detail with patient and patient verbalized understanding. 10-17-18 instructed in self massage for knot on incision    Home Exercise Program:  Patient instructed in the following for HEP:   Patient verbalized/demonstrated understanding and was issued written HEP. Timed Code Treatment Minutes:   40     Total Treatment Minutes     40    Treatment/Activity Tolerance:  [x] Patient tolerated treatment well [] Patient limited by fatigue  [] Patient limited by pain  [] Patient limited by other medical complications  [] Other:     Prognosis: [x] Good [] Fair  [] Poor    Goals:    Short term goals  Time Frame for Short term goals: Discharge  Short term goal 1: Indep with HEP  Short term goal 2: Decrease pain to 0-1/occasional right hip to allow normal self care, light ADL  Short term goal 3:  Increase left hip strength to 4/5 to normalize gait pattern and eliminate trendelenberg  Short term

## 2018-11-19 ENCOUNTER — HOSPITAL ENCOUNTER (OUTPATIENT)
Dept: PHYSICAL THERAPY | Age: 73
Setting detail: THERAPIES SERIES
Discharge: HOME | End: 2018-11-19
Payer: MEDICARE

## 2018-11-19 PROCEDURE — 97110 THERAPEUTIC EXERCISES: CPT

## 2018-11-19 NOTE — FLOWSHEET NOTE
Physical Therapy Daily Treatment Note  Date:  2018    Patient Name:  Mary Kate Redd    :  1945  MRN: [id number]    Restrictions/Precautions: Position Activity Restriction  Other position/activity restrictions: avoid flexion >90, IR, adduction. Pertinent Medical History: Additional Pertinent Hx: DDD, depression, hypothyroid    Medical/Treatment Diagnosis Information:  · Right THR 18  · Treatment Diagnosis: Decreased functional mobility post right THR    Insurance/Certification information:  PT Insurance Information: Anthem golden medicare  Physician Information:  Referring Practitioner: Mykel Watson MD   visit 10/25  Plan of care signed (Y/N):  Sent     Visit# / total visits:   Pain level: 0/10     G-Code (if applicable):  Vist 10  00/28/         LEFs  Score: 50  40-59%  Functional Limitation: Mobility: Walking and moving around  Mobility: Walking and Moving Around Current Status (): At least 40 percent but less than 60 percent impaired, limited or restricted  Mobility: Walking and Moving Around Goal Status (): At least 1 percent but less than 20 percent impaired, limited or restricted    History of Injury:  : Right THR posterolateral approach , on , discharged to [facility], spent 5 days there. had home PT for 6 visits. Pt left ankle has been a chronic problem, increased pain since Right THR - scheduled for triple arthrodesis in December. Subjective:  - was on vacation did a lot of walking/hiking did well. Did not have any soreness, despite the activity.  - pt notes he does not do any exercise at home. Objective: - - able to perform sidelying abd. Posterior precautions,. Amb with cane post PT.    10/5 off cane + trendelenberg Right, SLS 1-2 sec on right.     Exercise/Equipment Resistance/Repetitions Other comments     Right THR 18 post/lat   Nustep 6 min level 1 Left ankle OA - fusion sched    GSS incline 20 sec x

## 2018-11-21 ENCOUNTER — HOSPITAL ENCOUNTER (OUTPATIENT)
Dept: PHYSICAL THERAPY | Age: 73
Setting detail: THERAPIES SERIES
Discharge: HOME | End: 2018-11-21
Payer: MEDICARE

## 2018-11-21 PROCEDURE — 97110 THERAPEUTIC EXERCISES: CPT | Performed by: CHIROPRACTOR

## 2018-11-26 ENCOUNTER — HOSPITAL ENCOUNTER (OUTPATIENT)
Dept: PHYSICAL THERAPY | Age: 73
Setting detail: THERAPIES SERIES
Discharge: HOME | End: 2018-11-26
Payer: MEDICARE

## 2018-11-26 PROCEDURE — G8979 MOBILITY GOAL STATUS: HCPCS

## 2018-11-26 PROCEDURE — G8980 MOBILITY D/C STATUS: HCPCS

## 2018-11-26 PROCEDURE — 97530 THERAPEUTIC ACTIVITIES: CPT

## 2018-11-26 PROCEDURE — 97110 THERAPEUTIC EXERCISES: CPT

## 2018-11-26 NOTE — FLOWSHEET NOTE
Physical Therapy Daily Treatment Note  Date:  2018    Patient Name:  Erick Gautam    :  1945  MRN: [id number]    Restrictions/Precautions: Position Activity Restriction  Other position/activity restrictions: avoid flexion >90, IR, adduction. Pertinent Medical History: Additional Pertinent Hx: DDD, depression, hypothyroid    Medical/Treatment Diagnosis Information:  · Right THR 18  · Treatment Diagnosis: Decreased functional mobility post right THR    Insurance/Certification information:  PT Insurance Information: Anthem golden medicare  Physician Information:  Referring Practitioner: Chinyere Hicks MD   visit 10/25  Plan of care signed (Y/N):  Sent     Visit# / total visits:   Pain level: 0/10     G-Code (if applicable):  Vist 10  30/83//         LEFs  Score: 50  40-59%  Functional Limitation: Mobility: Walking and moving around  Mobility: Walking and Moving Around Current Status (): At least 40 percent but less than 60 percent impaired, limited or restricted  Mobility: Walking and Moving Around Goal Status (): At least 1 percent but less than 20 percent impaired, limited or restricted    History of Injury:  : Right THR posterolateral approach , on , discharged to [facility], spent 5 days there. had home PT for 6 visits. Pt left ankle has been a chronic problem, increased pain since Right THR - scheduled for triple arthrodesis in December. Subjective: no difficulty with self care or adl,  Left foot is now his limiting factor    Objective:   Ambulates without AD,  Notes he does well until left foot hurts. ROM - on hip precautions will Dec 6. Flexion to 90, abduction 25, knee and ankle WFL.   Strength - hip flexion  4+ /5, abduction  4-/5, knee extension 5/5  knee flexion 5/5    Exercise/Equipment Resistance/Repetitions Other comments     Right THR 18 post/lat   Nustep 6 min level 1 Left ankle OA - fusion sched    GSS incline 20 sec x 3      R only   pt preferred B today No flex>90, no ADD, no IR  Precautions till Dec 6   Gumdrops upside down pavel stance 1 min    Right stance - star tap  SLS X 10 by rail for safety     one min R         Fitter abd only , f/b 1 thin  2 x 10 each R/L     Step ups  Steps lat  Steps down 8\" - 2x10 R  8 x 10 R  6\" - 2x R    Leg Press   60#  2  x 10  Seat at 10  30#  2 x 10  Right  Seat 10    MHE  Abd, flex 15# -  flex/ /abd x 10  R     Knee ext     Knee flexion   15 # -  2 x 10 right   26#  2 x 10 right     11 bars  reebok S/S f/b  Side step  Fwd and back  Rocker board f/b, s/s   1 min ea  10 ' x  6 yellow TB  10' x 4 yellow TB  1 min each         S-L abd  2 x 10 2 # right. Reviewed importance of HEP with pt. Practice nwb gait with walker Up and down 4 steps x 2 with rail, crutch, able to do NWB on left Instructed pt to practice at home before procedure. Other Therapeutic Activities:  Pt was educated on PT POC, Diagnosis, Prognosis, pathomechanics as well as frequency and duration of scheduling future physical therapy appointments. Time was also taken on this day to answer all patient questions and participation in PT. Reviewed appointment policy in detail with patient and patient verbalized understanding. 10-17-18 instructed in self massage for knot on incision    Home Exercise Program:  Patient instructed in the following for HEP:   Patient verbalized/demonstrated understanding and was issued written HEP. Timed Code Treatment Minutes:   40    Total Treatment Minutes     40    Treatment/Activity Tolerance: Only limited by Left foot pain.   [x] Patient tolerated treatment well [] Patient limited by fatigue  [] Patient limited by pain  [] Patient limited by other medical complications  [] Other:     Prognosis: [x] Good [] Fair  [] Poor    Goals:   Met 11/26  Short term goals  Time Frame for Short term goals: Discharge  Short term goal 1: Indep with HEP  Short term goal 2: Decrease pain to 0-1/occasional

## 2018-11-28 ENCOUNTER — APPOINTMENT (OUTPATIENT)
Dept: PHYSICAL THERAPY | Age: 73
End: 2018-11-28
Payer: MEDICARE

## 2018-11-30 ENCOUNTER — APPOINTMENT (OUTPATIENT)
Dept: PHYSICAL THERAPY | Age: 73
End: 2018-11-30
Payer: MEDICARE

## 2019-04-24 ENCOUNTER — APPOINTMENT (OUTPATIENT)
Dept: PHYSICAL THERAPY | Age: 74
End: 2019-04-24
Payer: MEDICARE

## 2019-04-26 ENCOUNTER — HOSPITAL ENCOUNTER (OUTPATIENT)
Dept: PHYSICAL THERAPY | Age: 74
Setting detail: THERAPIES SERIES
Discharge: HOME | End: 2019-04-26
Payer: MEDICARE

## 2019-04-26 PROCEDURE — 97110 THERAPEUTIC EXERCISES: CPT

## 2019-04-26 PROCEDURE — 97162 PT EVAL MOD COMPLEX 30 MIN: CPT

## 2019-04-26 PROCEDURE — 97116 GAIT TRAINING THERAPY: CPT

## 2019-04-26 PROCEDURE — 97530 THERAPEUTIC ACTIVITIES: CPT

## 2019-04-26 NOTE — PROGRESS NOTES
Physical Therapy  Initial Assessment  Date: 2019  Patient Name: Buzz Alston  MRN: [id number]  : 1945     Treatment Diagnosis: Decreased functional mobility 2/2 left ankle fusion/ prior THR    Restrictions - medium fall risk 2/2 decreased balance. Subjective   General  Chart Reviewed: Yes  Additional Pertinent Hx: DDd, depression,  hypothyrodism, LBP,  THR 2018, left ankle joint injection 10/24/18  Left ankle fusion Dec 2018  Referring Practitioner: Aiden Oconnell  Diagnosis: Right hip replacement/pain  General Comment  Comments: Pt played \"horsey\" with 2 yo grandchildren and feels he did strain his hip a bit, has been noting increased limp since then. PT Visit Information  Onset Date: 18  PT Insurance Information: [employer]  Subjective  Subjective: Right THR 18, had home PT and Out patient PT  -  - Currently feels that is ROM and strength could be better  Had a left ankle fusion in December,  was using the rollabout and putting a lot of weight hopping on the right leg, and noted increased pain. Pain Screening  Patient Currently in Pain: Yes(0/10 at rest - gets up to a 5/10 with a lot of activity.)  Vital Signs  Patient Currently in Pain: Yes(0/10 at rest - gets up to a 5/10 with a lot of activity.)      Objective  Observation/Palpation  Posture: (+ LLD right shorter approx 1/4-3/8 inch)  Palpation: no specific TTP right hip    AROM RLE (degrees)  RLE General AROM: flexion 92, abduction  43,  IR/ER NT    Strength RLE  Comment: flexion, abd, 4+/5, IR/ER 4/5     Sensation  Overall Sensation Status: [facility]     Ambulation  Ambulation?: Yes  Ambulation 1  Quality of Gait: antalgic gait due to LLD, left ankle fusion assymmetrical pattern decreased left stance time. + [place] without AD.   Improved stability with heel lift and cane, smoother pattern noted  Balance  Tandem Stance R Leg: 3  Tandem Stance L Le  Single Leg Stance R Le  Single Leg Stance L Le Left foot/ankle NT - currently being seen at [facility] for left foot. Assessment   Conditions Requiring Skilled Therapeutic Intervention  Body structures, Functions, Activity limitations: Decreased functional mobility ; Decreased strength;Decreased endurance;Decreased balance  Assessment: Pt post right THR, left ankle fusion . Prior function - limited by pain. Current function limited gait, ADL endurance  Treatment Diagnosis: Decreased functional mobility 2/2 left ankle fusion/ prior THR  Patient Education: role of PT, anatomy  Barriers to Learning: none noted  REQUIRES PT FOLLOW UP: Yes  Treatment Initiated : POC  Activity Tolerance  Activity Tolerance: Patient Tolerated treatment well         Plan   Plan  Times per week: 2x/week x 8 weeks  Current Treatment Recommendations: Strengthening, ROM, Aquatics, Gait Training, Functional Mobility Training, Balance Training    OutComes Score  LEFS Total Score: 16    Goals  Short term goals  Time Frame for Short term goals: 2 weeks  Short term goal 1: get cane and heel lift, and use them   Long term goals  Time Frame for Long term goals : 6 weeks  Long term goal 1: normal gait pattern without trendelenberg with or without SPC  Long term goal 2: Decrease pain to 2-3/10 max on VAS to allow normal light activity. Long term goal 3: Normalize LE rom to allow pt to don shoes and socks easily.   Patient Goals   Patient goals : \"to be able to walk and not have pain     Timed Code Treatment Minutes:   45    Total Treatment Minutes:  [address] [facility], [id number]

## 2019-04-26 NOTE — PLAN OF CARE
Outpatient Physical Therapy  [x] [facility]    Phone: [phone]   Fax: [phone]   [] [facility]  Phone: [phone]              Fax: [phone]  [] [facility]   Phone: [phone]   Fax: [phone]     To: Referring Practitioner: Linda Schofield      Patient: Ghulam Rivera   : 1945   MRN: [id number]  Evaluation Date: 2019      Diagnosis Information:  ·  Dx - post right THR  · Decreased functional mobility post right THR   ·       Physical Therapy Certification    Dear Dr. Linda Schofield  The following patient has been evaluated for physical therapy services and for therapy to continue, Medicare requires monthly physician review of the treatment plan. Please review the attached evaluation and/or summary of the patient's plan of care, and verify that you agree therapy should continue by signing the attached document and sending it back to our office. Plan of Care/Treatment to date:  [x] Therapeutic Exercise    [x] Modalities:  [x] Therapeutic Activity     [] Ultrasound  [] Electrical Stimulation  [x] Gait Training      [] Cervical Traction [] Lumbar Traction  [] Neuromuscular Re-education    [] Cold/hotpack [] Iontophoresis   [x] Instruction in HEP     Other:  [x] Manual Therapy      []             [x] Aquatic Therapy      []           ? Frequency/Duration:  # Days per week: [] 1 day # Weeks: [] 1 week [] 5 weeks     [x] 2 days? [] 2 weeks [] 6 weeks     [] 3 days   [] 3 weeks [] 7 weeks     [] 4 days   [] 4 weeks [x] 8 weeks    Rehab Potential: [] Excellent [x] Good [] Fair  [] Poor       Electronically signed by:  Tanisha Solares, PT [id number]      If you have any questions or concerns, please don't hesitate to call.   Thank you for your referral.      Physician Signature:________________________________Date:__________________  By signing above, therapists plan is approved by physician

## 2019-04-26 NOTE — FLOWSHEET NOTE
32 Weeks pregnant and had testing done for her health insurance. Her results came back not good. (blood pressure, tryglycerides and cholesterol)  She was told she should call her provider and let you know. Physical Therapy Daily Treatment Note  Date:  2019    Patient Name:  Ghulam Rivera    :  1945  MRN: [id number]    Restrictions/Precautions:    mediuim fall risk -2/2 dec balance. Pertinent Medical History: Additional Pertinent Hx: DDd, depression,  hypothyrodism, LBP,  THR 2018, left ankle joint injection 10/24/18  Left ankle fusion Dec 2018    Medical/Treatment Diagnosis Information:  · Diagnosis: Right hip replacement/pain  · Treatment Diagnosis: Decreased functional mobility 2/2 left ankle fusion/ prior THR    Insurance/Certification information:   [facility] Medicare  Physician Information:  Referring Practitioner: Janes 4 of care signed (Y/N):  Sent     Visit# / total visits:    Pain level: 0/10     Functional Assessment: at eval  Test: LEFs  Score: 16  eval        History of Injury  Right THR 18, had home PT and Out patient PT  -  - Currently feels that is ROM and strength could be better  Had a left ankle fusion in December,  was using the rollabout and putting a lot of weight hopping on the right leg, and noted increased pain. Subjective:   Pain right hip , sore with activity, Notes worse since left ankle fusion. Objective:   Antalgic gait, LLD - right 1/4 inch shorter, Dec balance        Exercise/Equipment Resistance/Repetitions Other comments        Gait -  spc for safety and offloading Pt notes he felt more stable and comfortable with SPC        Heel lift Place in right shoe Dec lateral trunk movement, pt felt more comfortable. - will order from [facility]. [facility]     Review of [facility], current PT          Pool tour, policies and procedures  Pt expresses understanding. Other Therapeutic Activities:  Pt was educated on PT POC, Diagnosis, Prognosis, pathomechanics as well as frequency and duration of scheduling future physical therapy appointments.  Time was also taken on this day to answer all patient questions and participation in PT. Reviewed appointment policy in detail with patient and patient verbalized understanding. Home Exercise Program:  Patient instructed in the following for HEP:   Patient verbalized/demonstrated understanding and was issued written HEP. Timed Code Treatment Minutes:  45    Total Treatment Minutes:  60    Treatment/Activity Tolerance:  [] Patient tolerated treatment well [] Patient limited by fatigue  [x] Patient limited by pain  [] Patient limited by other medical complications  [] Other:     Prognosis: [x] Good [] Fair  [] Poor    Goals:    Short term goals  Time Frame for Short term goals: 2 weeks  Short term goal 1: get cane and heel lift, and use them       Long term goals  Time Frame for Long term goals : 6 weeks  Long term goal 1: normal gait pattern without trendelenberg with or without SPC  Long term goal 2: Decrease pain to 2-3/10 max on VAS to allow normal light activity. Long term goal 3: Normalize LE rom to allow pt to don shoes and socks easily. Patient Requires Follow-up: [x] Yes  [] No    Plan:   [] Continue per plan of care [] Alter current plan (see comments)  [x] Plan of care initiated [] Hold pending MD visit [] Discharge    Plan for Next Session:  Aquatic therapy to normalize gait/ balance.     Electronically signed by:  Ashleigh Zelaya, [facility] James

## 2019-04-29 ENCOUNTER — APPOINTMENT (OUTPATIENT)
Dept: PHYSICAL THERAPY | Age: 74
End: 2019-04-29
Payer: MEDICARE

## 2019-04-30 ENCOUNTER — HOSPITAL ENCOUNTER (OUTPATIENT)
Dept: PHYSICAL THERAPY | Age: 74
Setting detail: THERAPIES SERIES
Discharge: HOME | End: 2019-04-30
Payer: MEDICARE

## 2019-04-30 PROCEDURE — 97113 AQUATIC THERAPY/EXERCISES: CPT

## 2019-05-02 ENCOUNTER — HOSPITAL ENCOUNTER (OUTPATIENT)
Dept: PHYSICAL THERAPY | Age: 74
Setting detail: THERAPIES SERIES
Discharge: HOME | End: 2019-05-02
Payer: MEDICARE

## 2019-05-02 PROCEDURE — 97150 GROUP THERAPEUTIC PROCEDURES: CPT

## 2019-05-02 PROCEDURE — 97113 AQUATIC THERAPY/EXERCISES: CPT

## 2019-05-02 NOTE — FLOWSHEET NOTE
charline  [] Patient limited by pain  [] Patient limited by other medical complications  [x] Other: Margaret 40 minutes of aquatic therapy without increased pain    Prognosis: [] Good [] Fair  [] Poor    Patient Requires Follow-up: [] Yes  [] No    Plan:   [x] Continue per plan of care [] Alter current plan (see comments)  [] Plan of care initiated [] Hold pending MD visit [] Discharge    Plan for Next Session:  With emphasis on stabilization, good posture and exercise technique, gradually progress spinal stabilization exercises to increase strength, flexibility and endurance and to decrease pain and improve function.   Add: tandem stance, H>K, SLB    Electronically signed by: Mari Aguirre, PTA [id number]

## 2019-05-03 ENCOUNTER — APPOINTMENT (OUTPATIENT)
Dept: PHYSICAL THERAPY | Age: 74
End: 2019-05-03
Payer: MEDICARE

## 2019-05-06 ENCOUNTER — APPOINTMENT (OUTPATIENT)
Dept: PHYSICAL THERAPY | Age: 74
End: 2019-05-06
Payer: MEDICARE

## 2019-05-07 ENCOUNTER — HOSPITAL ENCOUNTER (OUTPATIENT)
Dept: PHYSICAL THERAPY | Age: 74
Setting detail: THERAPIES SERIES
Discharge: HOME | End: 2019-05-07
Payer: MEDICARE

## 2019-05-07 PROCEDURE — 97113 AQUATIC THERAPY/EXERCISES: CPT

## 2019-05-07 PROCEDURE — 97150 GROUP THERAPEUTIC PROCEDURES: CPT

## 2019-05-07 NOTE — FLOWSHEET NOTE
Physical Therapy Aquatic Flow Sheet   Date:  2019    Patient Name:  Pito Salinas    :  1945    Restrictions/Precautions:   Medium fall risk - 2/2 dec balance  Pertinent Medical History: DDD, depression, hypothyroidism, LBP, THR 18,  L ankle fusion Dec 2018    Diagnosis:  R hip replacement/pain   Treatment Diagnosis:  Decreased functional mobility 2/2 L ankle fusion and R THR    Insurance/Certification information:  [employer] Medicare  Referring Physician: Janes 4 of care signed (Y/N):      Visit# / total visits:    Pain level: 0/10      Progress Note: []  Yes  [x]  No  Next due by: Visit #10:      History of Injury: R THR 18, had home PT and outpt PT  -  - Currently feels that his ROM and strength could be better. Had a L ankle fusion in Dec, was using the rollabout and putting a lot of weight hopping on the R leg, and noted increased pain    Subjective:   Sees MD tomorrow. Is going to ask him whether he should be doing both land and aquatic therapy. Had therapy for his ankle this morning at a different clinic, so ankle is sore and swollen. No hip pain at present.     Objective:   Observation:  See eval   Test measurements:      Key  B= Belt DB= Dumbells T= Theratube   G=Gloves N= Noodles W= Weights   P= Paddles WW=Water Walker K= Kickboard        Transfers:   Steps  (ind)      % Immersion:  75            Ambulation:  Laps Exercises UE:      Forwards  4 + 2 Shoulder Shrugs     Lateral  2 Shoulder circles     Marching    Scapular Retraction      Retro   Rolling      [place]  Push Downs      IR/ER      Punching    Stretching:  Rowing    Gastroc/Soleus  [id number] Elbow Flex/Ext    Hamstring  [id number] Shldr Flex/Ext     Knee flex stretch   Shldr Abd/Add    Piriformis   Horiz Abd/Add     Hip flexor   [id number] Arm Circles     SKTC   PNF Diagonals    DKTC  UE oscillations f/b, s/s    ITB   Wall Push-ups    Quad  Figure 8's    Mid back   Buoy pull/push downs    UT  Tband rows Rhom  Tband lats    Post Shoulder  Lumbar Rot w/ paddles    Pec   Small ball pull downs                   diagonals             Cervical:       AROM Flex       AROM Extension     LEs exercises:   AROM Side Bending    Marching  10 AROM Rotation    Heel Raises  10 Chin tucks    Toe Raises  10 Chin nods     Squats  10      Hamstring Curls  10      Hip Flexion  10 Balance: Hip Abduction  10 SLS  30sec R/L   Hip Circles  10 Tandem stance  30sec R/L   TA set   NBOS eyes open    Glut Set  10 NBOS eyes closed    Hip Extension  Hand to Opposite Knee  10   Hip Adduction    Box Step     Hip IR   Noodle Stance     Hip ER  Stop/Go Gait    Fig 8's  Switch Gait                Seated:  Functional:    Ankle Pumps  10 Step up forward    Ankle circles  10 Step up lateral    Knee flex & ext  10 Step down    Hip Abd & Adduction  10 [place] squats    Bicycle   10 Crate Lifts    Add Set with ball  10 Lunges forward    LX stab with med ball throws  Lunges lateral    Ankle INV  Lunges retro    Ankle EV  Lower ab curl with noodle      Upper ab curl with ball      Med ball straight lifts      Med ball diagonal lifts      Hydrorider          Noodle:      Leg Press  Deep Water:    Noodle hang at wall  Jog    Noodle hang deep water  Jumping Jacks    Noodle Bicycle  Heel to toe      Hand to opposite knee      Cross country skier      Rocking Horse      **Aquatic group therapy is the presence of more than 1 patient in the pool, at the same time. During that time each patient receives individualized instruction designed for their specific diagnosis. Group Aquatic Timed Minutes:  25    Individual Aquatic Timed Minutes:  20    Treatment/Activity Tolerance:  [x] Patient tolerated treatment well [] Patient limited by fatique  [] Patient limited by pain  [] Patient limited by other medical complications  [x] Other: No increased pain after aquatic therapy.  Land therapy generally bothers his hip    Prognosis: [] Good [] Fair  [] Poor    Patient Requires Follow-up: [] Yes  [] No    Plan:   [x] Continue per plan of care [] Alter current plan (see comments)  [] Plan of care initiated [] Hold pending MD visit [] Discharge    Plan for Next Session:  With emphasis on stabilization, good posture and exercise technique, gradually progress spinal stabilization exercises to increase strength, flexibility and endurance and to decrease pain and improve function.   Add: marching, increase seated reps, leg press    Electronically signed by: Hamida Crain, PTA [id number]

## 2019-05-09 ENCOUNTER — HOSPITAL ENCOUNTER (OUTPATIENT)
Dept: PHYSICAL THERAPY | Age: 74
Setting detail: THERAPIES SERIES
Discharge: HOME | End: 2019-05-09
Payer: MEDICARE

## 2019-05-09 PROCEDURE — 97150 GROUP THERAPEUTIC PROCEDURES: CPT

## 2019-05-09 PROCEDURE — 97113 AQUATIC THERAPY/EXERCISES: CPT

## 2019-05-09 NOTE — FLOWSHEET NOTE
Physical Therapy Aquatic Flow Sheet   Date:  2019    Patient Name:  Fay Nair    :  1945    Restrictions/Precautions:   Medium fall risk - 2/2 dec balance  Pertinent Medical History: DDD, depression, hypothyroidism, LBP, THR 18,  L ankle fusion Dec 2018    Diagnosis:  R hip replacement/pain   Treatment Diagnosis:  Decreased functional mobility 2/2 L ankle fusion and R THR    Insurance/Certification information:  [employer] Medicare  Referring Physician: Janes 4 of care signed (Y/N):      Visit# / total visits:    Pain level: 1-2/10      Progress Note: []  Yes  [x]  No  Next due by: Visit #10:      History of Injury: R THR 18, had home PT and outpt PT  -  - Currently feels that his ROM and strength could be better. Had a L ankle fusion in Dec, was using the rollabout and putting a lot of weight hopping on the R leg, and noted increased pain    Subjective:   Pt c/o sporadic hip soreness.  Saw doctor who told him to continue with both the land therapy for his ankle and aquatic therapy for his foot    Objective:   Observation:  See eval   Test measurements:      Key  B= Belt DB= Dumbells T= Theratube   G=Gloves N= Noodles W= Weights   P= Paddles WW=Water Walker K= Kickboard        Transfers:   Steps  (ind)      % Immersion:  75            Ambulation:  Laps Exercises UE:      Forwards  4 + 2 Shoulder Shrugs     Lateral  3 Shoulder circles     Marching  2 Scapular Retraction      Retro   Rolling      [place]  Push Downs      IR/ER      Punching    Stretching:  Rowing    Gastroc/Soleus  [id number] Elbow Flex/Ext    Hamstring  [id number] Shldr Flex/Ext     Knee flex stretch   Shldr Abd/Add    Piriformis   Horiz Abd/Add     Hip flexor   [id number] Arm Circles     SKTC   PNF Diagonals    DKTC  UE oscillations f/b, s/s    ITB   Wall Push-ups    Quad  Figure 8's    Mid back   Buoy pull/push downs    UT  Tband rows    Rhom  Tband lats    Post Shoulder  Lumbar Rot w/ paddles    Pec   Small ball pull downs                   diagonals             Cervical:       AROM Flex       AROM Extension     LEs exercises:   AROM Side Bending    Marching  10 AROM Rotation    Heel Raises  10 Chin tucks    Toe Raises  10 Chin nods     Squats  10      Hamstring Curls  10      Hip Flexion  10 Balance: Hip Abduction  10 SLS  30sec R/L   Hip Circles  10 Tandem stance  30sec R/L   TA set   NBOS eyes open    Glut Set  10 NBOS eyes closed    Hip Extension  Hand to Opposite Knee  10   Hip Adduction    Box Step     Hip IR   Noodle Stance     Hip ER  Stop/Go Gait    Fig 8's  Switch Gait                Seated:  Functional:    Ankle Pumps  20 Step up forward    Ankle circles  10 Step up lateral    Knee flex & ext  20 Step down    Hip Abd & Adduction  20 [place] squats    Bicycle   20 Crate Lifts    Add Set with ball  10 Lunges forward    LX stab with med ball throws  Lunges lateral    Ankle INV  Lunges retro    Ankle EV  Lower ab curl with noodle      Upper ab curl with ball      Med ball straight lifts      Med ball diagonal lifts      Hydrorider          Noodle:      Leg Press  10 Deep Water:    Noodle hang at wall  Jog    Noodle hang deep water  Jumping Jacks    Noodle Bicycle  Heel to toe      Hand to opposite knee      Cross country skier      Rocking Horse      **Aquatic group therapy is the presence of more than 1 patient in the pool, at the same time. During that time each patient receives individualized instruction designed for their specific diagnosis.   Group Aquatic Timed Minutes:  25    Individual Aquatic Timed Minutes:  20    Treatment/Activity Tolerance:  [x] Patient tolerated treatment well [] Patient limited by fatique  [] Patient limited by pain  [] Patient limited by other medical complications  [] Other:     Prognosis: [] Good [] Fair  [] Poor    Patient Requires Follow-up: [] Yes  [] No    Plan:   [x] Continue per plan of care [] Alter current plan (see comments)  [] Plan of care initiated [] Hold pending MD visit [] Discharge    Plan for Next Session:  With emphasis on stabilization, good posture and exercise technique, gradually progress spinal stabilization exercises to increase strength, flexibility and endurance and to decrease pain and improve function.   Add: step ups  Pt going out of town until the end of the month and will then resume therapy May 28  Electronically signed by: Peg Winston, PTA [id number]

## 2019-05-10 ENCOUNTER — APPOINTMENT (OUTPATIENT)
Dept: PHYSICAL THERAPY | Age: 74
End: 2019-05-10
Payer: MEDICARE

## 2019-05-13 ENCOUNTER — APPOINTMENT (OUTPATIENT)
Dept: PHYSICAL THERAPY | Age: 74
End: 2019-05-13
Payer: MEDICARE

## 2019-05-17 ENCOUNTER — APPOINTMENT (OUTPATIENT)
Dept: PHYSICAL THERAPY | Age: 74
End: 2019-05-17
Payer: MEDICARE

## 2019-05-20 ENCOUNTER — APPOINTMENT (OUTPATIENT)
Dept: PHYSICAL THERAPY | Age: 74
End: 2019-05-20
Payer: MEDICARE

## 2019-05-22 ENCOUNTER — APPOINTMENT (OUTPATIENT)
Dept: PHYSICAL THERAPY | Age: 74
End: 2019-05-22
Payer: MEDICARE

## 2019-05-28 ENCOUNTER — HOSPITAL ENCOUNTER (OUTPATIENT)
Dept: PHYSICAL THERAPY | Age: 74
Setting detail: THERAPIES SERIES
Discharge: HOME | End: 2019-05-28
Payer: MEDICARE

## 2019-05-28 PROCEDURE — 97113 AQUATIC THERAPY/EXERCISES: CPT

## 2019-05-28 PROCEDURE — 97150 GROUP THERAPEUTIC PROCEDURES: CPT

## 2019-05-28 NOTE — FLOWSHEET NOTE
lats    Post Shoulder  Lumbar Rot w/ paddles    Pec   Small ball pull downs                   diagonals             Cervical:       AROM Flex       AROM Extension     LEs exercises:   AROM Side Bending    Marching  10 AROM Rotation    Heel Raises  10 Chin tucks    Toe Raises  10 Chin nods     Squats  10      Hamstring Curls  10      Hip Flexion  10 Balance: Hip Abduction  10 SLS  30sec R/L   Hip Circles  10 Tandem stance  30sec R/L   TA set   NBOS eyes open    Glut Set  10 NBOS eyes closed    Hip Extension  Hand to Opposite Knee  10   Hip Adduction    Box Step     Hip IR   Noodle Stance     Hip ER  Stop/Go Gait    Fig 8's  Switch Gait                Seated:  Functional:    Ankle Pumps  20 Step up forward  10   Ankle circles  10 Step up lateral    Knee flex & ext  20 Step down    Hip Abd & Adduction  20 [place] squats    Bicycle   20 Crate Lifts    Add Set with ball  10 Lunges forward    LX stab with med ball throws  Lunges lateral    Ankle INV  Lunges retro    Ankle EV  Lower ab curl with noodle      Upper ab curl with ball      Med ball straight lifts      Med ball diagonal lifts      Hydrorider          Noodle:      Leg Press  10 Deep Water:    Noodle hang at wall  Jog    Noodle hang deep water  Jumping Jacks    Noodle Bicycle  Heel to toe      Hand to opposite knee      Cross country skier      Rocking Horse      **Aquatic group therapy is the presence of more than 1 patient in the pool, at the same time. During that time each patient receives individualized instruction designed for their specific diagnosis.   Group Aquatic Timed Minutes:  30    Individual Aquatic Timed Minutes:  20    Treatment/Activity Tolerance:  [x] Patient tolerated treatment well [] Patient limited by fatique  [] Patient limited by pain  [] Patient limited by other medical complications  [x] Other: just feels the pain down the front of the R thigh    Prognosis: [] Good [] Fair  [] Poor    Patient Requires Follow-up: [] Yes  [] No    Plan:   [x] Continue per plan of care [] Alter current plan (see comments)  [] Plan of care initiated [] Hold pending MD visit [] Discharge    Plan for Next Session:  With emphasis on stabilization, good posture and exercise technique, gradually progress spinal stabilization exercises to increase strength, flexibility and endurance and to decrease pain and improve function.   Add: lateral step ups, increase seated reps    Electronically signed by: Rita Mendoza, PTA [id number]

## 2019-05-29 ENCOUNTER — APPOINTMENT (OUTPATIENT)
Dept: PHYSICAL THERAPY | Age: 74
End: 2019-05-29
Payer: MEDICARE

## 2019-05-30 ENCOUNTER — HOSPITAL ENCOUNTER (OUTPATIENT)
Dept: PHYSICAL THERAPY | Age: 74
Setting detail: THERAPIES SERIES
Discharge: HOME | End: 2019-05-30
Payer: MEDICARE

## 2019-05-30 PROCEDURE — 97113 AQUATIC THERAPY/EXERCISES: CPT

## 2019-05-30 PROCEDURE — 97150 GROUP THERAPEUTIC PROCEDURES: CPT

## 2019-05-30 NOTE — FLOWSHEET NOTE
Physical Therapy Aquatic Flow Sheet   Date:  2019    Patient Name:  Sabrina Huang    :  1945    Restrictions/Precautions:   Medium fall risk - 2/2 dec balance  Pertinent Medical History: DDD, depression, hypothyroidism, LBP, THR 18,  L ankle fusion Dec 2018    Diagnosis:  R hip replacement/pain   Treatment Diagnosis:  Decreased functional mobility 2/2 L ankle fusion and R THR    Insurance/Certification information:  [employer]  Referring Physician: Janes 4 of care signed (Y/N):      Visit# / total visits:    Pain level: 1-2/10 Foot                          1-2/10 R hip/thigh     Progress Note: []  Yes  [x]  No  Next due by: Visit #10:      History of Injury: R THR 18, had home PT and outpt PT  -  - Currently feels that his ROM and strength could be better. Had a L ankle fusion in Dec, was using the rollabout and putting a lot of weight hopping on the R leg, and noted increased pain    Subjective:  No c/o with pool exercises.   Objective:   Observation:  See eval   Test measurements:      Key  B= Belt DB= Dumbells T= Theratube   G=Gloves N= Noodles W= Weights   P= Paddles WW=Water Walker K= Kickboard        Transfers:   Steps  (ind)      % Immersion:  75            Ambulation:  Laps Exercises UE:      Forwards  6 Shoulder Shrugs     Lateral  4 Shoulder circles     Marching  2 Scapular Retraction      Retro   Rolling      Cariocas  Push Downs      IR/ER      Punching    Stretching:  Rowing    Gastroc/Soleus  8s24tfv Elbow Flex/Ext    Hamstring  8d26vtm Shldr Flex/Ext     Knee flex stretch   Shldr Abd/Add    Piriformis   Horiz Abd/Add     Hip flexor   6m72nno R Arm Circles     SKTC   PNF Diagonals    DKTC  UE oscillations f/b, s/s    ITB   Wall Push-ups    Quad  0d88rul R Figure 8's    Mid back   Buoy pull/push downs    UT  Tband rows    Rhom  Tband lats    Post Shoulder  Lumbar Rot w/ paddles    Pec   Small ball pull downs                   diagonals Cervical:       AROM Flex       AROM Extension     LEs exercises:   AROM Side Bending    Marching  10 AROM Rotation    Heel Raises  10 Chin tucks    Toe Raises  10 Chin nods     Squats  10      Hamstring Curls  10      Hip Flexion  10 Balance: Hip Abduction  10 SLS  30sec R/L   Hip Circles  10 Tandem stance  30sec R/L   TA set   NBOS eyes open    Glut Set  10 NBOS eyes closed    Hip Extension  Hand to Opposite Knee  10   Hip Adduction    Box Step     Hip IR   Noodle Stance     Hip ER  Stop/Go Gait    Fig 8's  Switch Gait                Seated:  Functional:    Ankle Pumps  30 Step up forward  10   Ankle circles  10 Step up lateral 10 R/L   Knee flex & ext  30 Step down    Hip Abd & Adduction  30 [place] squats    Bicycle   30 Crate Lifts    Add Set with ball  10 Lunges forward    LX stab with med ball throws  Lunges lateral    Ankle INV  Lunges retro    Ankle EV  Lower ab curl with noodle      Upper ab curl with ball      Med ball straight lifts      Med ball diagonal lifts      Hydrorider          Noodle:      Leg Press  15 Deep Water:    Noodle hang at wall  Jog    Noodle hang deep water  Jumping Jacks    Noodle Bicycle  Heel to toe      Hand to opposite knee      Cross country skier      Rocking Horse      **Aquatic group therapy is the presence of more than 1 patient in the pool, at the same time. During that time each patient receives individualized instruction designed for their specific diagnosis.   Group Aquatic Timed Minutes:  40    Individual Aquatic Timed Minutes:  20    Treatment/Activity Tolerance:  [x] Patient tolerated treatment well [] Patient limited by fatique  [] Patient limited by pain  [] Patient limited by other medical complications  [x] Other: just feels the pain down the front of the R thigh    Prognosis: [] Good [] Fair  [] Poor    Patient Requires Follow-up: [] Yes  [] No    Plan:   [x] Continue per plan of care [] Alter current plan (see comments)  [] Plan of care initiated [] Hold pending MD visit [] Discharge    Plan for Next Session:  With emphasis on stabilization, good posture and exercise technique, gradually progress spinal stabilization exercises to increase strength, flexibility and endurance and to decrease pain and improve function.   Add: per plan    Electronically signed by: Zuleika Lui PTA [id number]

## 2019-05-31 ENCOUNTER — APPOINTMENT (OUTPATIENT)
Dept: PHYSICAL THERAPY | Age: 74
End: 2019-05-31
Payer: MEDICARE

## 2019-06-03 ENCOUNTER — APPOINTMENT (OUTPATIENT)
Dept: PHYSICAL THERAPY | Age: 74
End: 2019-06-03
Payer: MEDICARE

## 2019-06-04 ENCOUNTER — HOSPITAL ENCOUNTER (OUTPATIENT)
Dept: PHYSICAL THERAPY | Age: 74
Setting detail: THERAPIES SERIES
Discharge: HOME | End: 2019-06-04
Payer: MEDICARE

## 2019-06-04 PROCEDURE — 97113 AQUATIC THERAPY/EXERCISES: CPT

## 2019-06-04 PROCEDURE — 97150 GROUP THERAPEUTIC PROCEDURES: CPT

## 2019-06-04 NOTE — FLOWSHEET NOTE
Follow-up: [] Yes  [] No    Plan:   [x] Continue per plan of care [] Alter current plan (see comments)  [] Plan of care initiated [] Hold pending MD visit [] Discharge    Plan for Next Session:  With emphasis on stabilization, good posture and exercise technique, gradually progress spinal stabilization exercises to increase strength, flexibility and endurance and to decrease pain and improve function.   Add:  Large noodle with leg press    Electronically signed by: Michael Javed, PTA [id number]

## 2019-06-06 ENCOUNTER — HOSPITAL ENCOUNTER (OUTPATIENT)
Dept: PHYSICAL THERAPY | Age: 74
Setting detail: THERAPIES SERIES
Discharge: HOME | End: 2019-06-06
Payer: MEDICARE

## 2019-06-06 PROCEDURE — 97113 AQUATIC THERAPY/EXERCISES: CPT

## 2019-06-06 PROCEDURE — 97150 GROUP THERAPEUTIC PROCEDURES: CPT

## 2019-06-06 NOTE — FLOWSHEET NOTE
Physical Therapy Aquatic Flow Sheet   Date:  2019    Patient Name:  Pito Salinas    :  1945    Restrictions/Precautions:   Medium fall risk - 2/2 dec balance  Pertinent Medical History: DDD, depression, hypothyroidism, LBP, THR 18,  L ankle fusion Dec 2018    Diagnosis:  R hip replacement/pain   Treatment Diagnosis:  Decreased functional mobility 2/2 L ankle fusion and R THR    Insurance/Certification information:  [employer]  Referring Physician: Janes 4 of care signed (Y/N):      Visit# / total visits:    Pain level: 1-2/10 Foot                          -2/10 R hip/thigh     Progress Note: []  Yes  [x]  No  Next due by: Visit #10:      History of Injury: R THR 18, had home PT and outpt PT  -  - Currently feels that his ROM and strength could be better. Had a L ankle fusion in Dec, was using the rollabout and putting a lot of weight hopping on the R leg, and noted increased pain    Subjective:   Has increased flexibility, but still feels rigid.  Saw doctor yesterday, but still does not know what the cause of his thigh pain is    Objective:   Observation:  See eval   Test measurements:      Key  B= Belt DB= Dumbells T= Theratube   G=Gloves N= Noodles W= Weights   P= Paddles WW=Water Walker K= Kickboard        Transfers:   Steps  (ind)      % Immersion:  75            Ambulation:  Laps Exercises UE:      Forwards  6 Shoulder Shrugs     Lateral  4 Shoulder circles     Marching  2 Scapular Retraction      Retro  2 Rolling      Cariocas  Push Downs      IR/ER      Punching    Stretching:  Rowing    Gastroc/Soleus  4d51kbj Elbow Flex/Ext    Hamstring  2x50drn Shldr Flex/Ext     Knee flex stretch   Shldr Abd/Add    Piriformis   Horiz Abd/Add     Hip flexor   9q46qru R Arm Circles     SKTC   PNF Diagonals    DKTC  UE oscillations f/b, s/s    ITB   Wall Push-ups    Quad   Figure 8's    Mid back   Buoy pull/push downs    UT  Tband rows    Rhom  Tband lats    Post Shoulder  Lumbar Rot w/ paddles    Pec   Small ball pull downs                   diagonals             Cervical:       AROM Flex       AROM Extension     LEs exercises:   AROM Side Bending    Marching  10 AROM Rotation    Heel Raises  10 Chin tucks    Toe Raises  10 Chin nods     Squats  10      Hamstring Curls  10      Hip Flexion  10 Balance: Hip Abduction  10 SLS  30sec R/L   Hip Circles  10 Tandem stance  30sec R/L   TA set   NBOS eyes open    Glut Set   NBOS eyes closed    Hip Extension  10 Hand to Opposite Knee  10   Hip Adduction    Box Step     Hip IR   Noodle Stance     Hip ER  Stop/Go Gait    Fig 8's  Switch Gait                Seated:  Functional:    Ankle Pumps  30 Step up forward  10   Ankle circles  10 Step up lateral  10 R/L   Knee flex & ext  30 Step down  10   Hip Abd & Adduction  30 [place] squats    Bicycle   30 Crate Lifts    Add Set with ball  10 Lunges forward    LX stab with med ball throws  Lunges lateral    Ankle INV  Lunges retro    Ankle EV  Lower ab curl with noodle      Upper ab curl with ball      Med ball straight lifts      Med ball diagonal lifts      Hydrorider          Noodle:      Leg Press  20 (large noodle) Deep Water:    Noodle hang at wall  Jog    Noodle hang deep water  Jumping Jacks    Noodle Bicycle  Heel to toe      Hand to opposite knee      Cross country skier      Rocking Horse      **Aquatic group therapy is the presence of more than 1 patient in the pool, at the same time. During that time each patient receives individualized instruction designed for their specific diagnosis. Group Aquatic Timed Minutes:  40    Individual Aquatic Timed Minutes:  20    Treatment/Activity Tolerance:  [x] Patient tolerated treatment well [] Patient limited by fatique  [] Patient limited by pain  [] Patient limited by other medical complications  [x] Other: decreased pain after aquatics. Still descending steps 1 at a time.  Ascending steps varies, depending on how confident he

## 2019-06-07 ENCOUNTER — APPOINTMENT (OUTPATIENT)
Dept: PHYSICAL THERAPY | Age: 74
End: 2019-06-07
Payer: MEDICARE

## 2019-06-10 ENCOUNTER — APPOINTMENT (OUTPATIENT)
Dept: PHYSICAL THERAPY | Age: 74
End: 2019-06-10
Payer: MEDICARE

## 2019-06-11 ENCOUNTER — HOSPITAL ENCOUNTER (OUTPATIENT)
Dept: PHYSICAL THERAPY | Age: 74
Setting detail: THERAPIES SERIES
Discharge: HOME | End: 2019-06-11
Payer: MEDICARE

## 2019-06-11 PROCEDURE — 97113 AQUATIC THERAPY/EXERCISES: CPT

## 2019-06-11 PROCEDURE — 97150 GROUP THERAPEUTIC PROCEDURES: CPT

## 2019-06-11 PROCEDURE — 97530 THERAPEUTIC ACTIVITIES: CPT

## 2019-06-11 NOTE — FLOWSHEET NOTE
Physical Therapy Daily Treatment Note  Date:  2019    Patient Name:  Alexandrea Godinez    :  1945  MRN: [id number]    Restrictions/Precautions:    mediuim fall risk -/2 dec balance. Pertinent Medical History: Additional Pertinent Hx: DDd, depression,  hypothyrodism, LBP,  THR 2018, left ankle joint injection 10/24/18  Left ankle fusion Dec 2018    Medical/Treatment Diagnosis Information:  · Diagnosis: Right hip replacement/pain  · Treatment Diagnosis: Decreased functional mobility 2/2 left ankle fusion/ prior THR    Insurance/Certification information:   [facility] Medicare  Physician Information:  Referring Practitioner: Janes 4 of care signed (Y/N):  Sent     Visit# / total visits: 10/16  1/2 hour late  Pain level: 1-2/10 on average    Functional Assessment: at eval  Test: LEFs  Score: 16  eval        History of Injury  Right THR 18, had home PT and Out patient PT  -  - Currently feels that is ROM and strength could be better  Had a left ankle fusion in December,  was using the rollabout and putting a lot of weight hopping on the right leg, and noted increased pain. Subjective:   Pain right hip , sore with activity, Notes worse since left ankle fusion.  - gets occasional 8-10 pain, moves then with difficulty. Notes it happens normally with walking, 1-2 x day, may be random,  Recommended that pt use cane to offload right leg. To MD - xrays of LS DDD, nothing remarkeable per MD    Objective:   Antalgic gait, LLD - right 1/4 inch shorter, Dec balance        Exercise/Equipment Resistance/Repetitions Other comments        Gait -  No longer using the cane Recommended using to offload hip        Has orthotics in both shoes Has felt that they help to a certain extent.                            Other Therapeutic Activities:  Pt was educated on PT POC, Diagnosis, Prognosis, pathomechanics as well as frequency and duration of scheduling future physical therapy

## 2019-06-11 NOTE — FLOWSHEET NOTE
Physical Therapy Aquatic Flow Sheet   Date:  2019    Patient Name:  Srinivas Coronado    :  1945    Restrictions/Precautions:   Medium fall risk - 2/2 dec balance  Pertinent Medical History: DDD, depression, hypothyroidism, LBP, THR 18,  L ankle fusion Dec 2018    Diagnosis:  R hip replacement/pain   Treatment Diagnosis:  Decreased functional mobility 2/2 L ankle fusion and R THR    Insurance/Certification information:  [employer] Medicare  Referring Physician: Janes 4 of care signed (Y/N):      Visit# / total visits:  10/12  Pain level: 1-2/10 Foot                          -6/10 R hip/thigh     Progress Note: []  Yes  [x]  No  Next due by: Visit #10:      History of Injury: R THR 18, had home PT and outpt PT  -  - Currently feels that his ROM and strength could be better. Had a L ankle fusion in Dec, was using the rollabout and putting a lot of weight hopping on the R leg, and noted increased pain    Subjective: Intermittent shooting pain front, lateral hip. Saw doctor and started him on muscle relaxers.  Started taking them this morning    Objective:   Observation:  See eval   Test measurements:      Key  B= Belt DB= Dumbells T= Theratube   G=Gloves N= Noodles W= Weights   P= Paddles WW=Water Walker K= Kickboard        Transfers:   Steps  (ind)      % Immersion:  75            Ambulation:  Laps Exercises UE:      Forwards  6 Shoulder Shrugs     Lateral  4 Shoulder circles     Marching  2 Scapular Retraction      Retro  2 Rolling      Cariocas  Push Downs      IR/ER      Punching    Stretching:  Rowing    Gastroc/Soleus  7h85avb Elbow Flex/Ext    Hamstring  4q24zzr Shldr Flex/Ext     Knee flex stretch   Shldr Abd/Add    Piriformis   Horiz Abd/Add     Hip flexor   8x42usi R Arm Circles     SKTC   PNF Diagonals    DKTC  UE oscillations f/b, s/s    ITB   Wall Push-ups    Quad   Figure 8's    Mid back   Buoy pull/push downs    UT  Tband rows    Rhom  Tband lats    Post Shoulder Lumbar Rot w/ paddles    Pec   Small ball pull downs                   diagonals             Cervical:       AROM Flex       AROM Extension     LEs exercises:   AROM Side Bending    Marching  10 AROM Rotation    Heel Raises  10 Chin tucks    Toe Raises  10 Chin nods     Squats  10      Hamstring Curls  10      Hip Flexion  10 Balance: Hip Abduction  10 SLS  30sec R/L   Hip Circles  10 Tandem stance  30sec R/L   TA set   NBOS eyes open    Glut Set   NBOS eyes closed    Hip Extension  10 Hand to Opposite Knee  10   Hip Adduction    Box Step     Hip IR/ER  10 Noodle Stance     Hip pendulums  10 Stop/Go Gait    Fig 8's  Switch Gait                Seated:  Functional:    Ankle Pumps  30 Step up forward  10   Ankle circles  10 Step up lateral  10    Knee flex & ext  30 Step down  10   Hip Abd & Adduction  30 [place] squats    Bicycle   30 Crate Lifts    Add Set with ball  10 Lunges forward    LX stab with med ball throws  Lunges lateral    Ankle INV  Lunges retro    Ankle EV  Lower ab curl with noodle      Upper ab curl with ball      Med ball straight lifts      Med ball diagonal lifts      Hydrorider          Noodle:      Leg Press  20 (large noodle) Deep Water:    Noodle hang at wall  Jog    Noodle hang deep water  Jumping Jacks    Noodle Bicycle  Heel to toe      Hand to opposite knee      Cross country skier      Rocking Horse      **Aquatic group therapy is the presence of more than 1 patient in the pool, at the same time. During that time each patient receives individualized instruction designed for their specific diagnosis.   Group Aquatic Timed Minutes:  40    Individual Aquatic Timed Minutes:  20    Treatment/Activity Tolerance:  [x] Patient tolerated treatment well [] Patient limited by fatique  [] Patient limited by pain  [] Patient limited by other medical complications  [x] Other: Feels like he stretched something out good during session today    Prognosis: [] Good [] Fair  [] Poor    Patient

## 2019-06-12 ENCOUNTER — APPOINTMENT (OUTPATIENT)
Dept: PHYSICAL THERAPY | Age: 74
End: 2019-06-12
Payer: MEDICARE

## 2019-06-13 ENCOUNTER — HOSPITAL ENCOUNTER (OUTPATIENT)
Dept: PHYSICAL THERAPY | Age: 74
Setting detail: THERAPIES SERIES
Discharge: HOME | End: 2019-06-13
Payer: MEDICARE

## 2019-06-13 PROCEDURE — 97150 GROUP THERAPEUTIC PROCEDURES: CPT

## 2019-06-13 PROCEDURE — 97113 AQUATIC THERAPY/EXERCISES: CPT

## 2019-06-13 NOTE — FLOWSHEET NOTE
Physical Therapy Aquatic Flow Sheet   Date:  2019    Patient Name:  Selin Cedeno    :  1945    Restrictions/Precautions:   Medium fall risk - 2/2 dec balance  Pertinent Medical History: DDD, depression, hypothyroidism, LBP, THR 18,  L ankle fusion Dec 2018    Diagnosis:  R hip replacement/pain   Treatment Diagnosis:  Decreased functional mobility 2/2 L ankle fusion and R THR    Insurance/Certification information:  [employer] Medicare  Referring Physician: Janes 4 of care signed (Y/N):      Visit# / total visits:    Pain level: 1-2/10 Foot                          3-4/10 R hip/thigh     Progress Note: []  Yes  [x]  No  Next due by: Visit #10:      History of Injury: R THR 18, had home PT and outpt PT  -  - Currently feels that his ROM and strength could be better. Had a L ankle fusion in Dec, was using the rollabout and putting a lot of weight hopping on the R leg, and noted increased pain    Subjective:   Just achy today, R thigh/ant hip. [place]  Hasn't noticed a difference with taking the muscle relaxers     Objective:   Observation:  See eval   Test measurements:      Key  B= Belt DB= Dumbells T= Theratube   G=Gloves N= Noodles W= Weights   P= Paddles WW=Water Walker K= Kickboard        Transfers:   Steps  (ind)      % Immersion:  75            Ambulation:  Laps Exercises UE:      Forwards  6 Shoulder Shrugs     Lateral  4 Shoulder circles     Marching  2 Scapular Retraction      Retro  2 Rolling      Cariocas  Push Downs      IR/ER      Punching    Stretching:  Rowing    Gastroc/Soleus  4n40yyo Elbow Flex/Ext    Hamstring  9i49ewo Shldr Flex/Ext     Knee flex stretch   Shldr Abd/Add    Piriformis   Horiz Abd/Add     Hip flexor   8x90zpm R Arm Circles     SKTC   PNF Diagonals    DKTC  UE oscillations f/b, s/s    ITB   Wall Push-ups    Quad   Figure 8's    Mid back   Buoy pull/push downs    UT  Tband rows    Rhom  Tband lats Post Shoulder  Lumbar Rot w/ paddles    Pec   Small ball pull downs                   diagonals             Cervical:       AROM Flex       AROM Extension     LEs exercises:   AROM Side Bending    Marching  10 AROM Rotation    Heel Raises  10 Chin tucks    Toe Raises  10 Chin nods     Squats  10      Hamstring Curls  10      Hip Flexion  10 Balance: Hip Abduction  10 SLS  30sec R/L   Hip Circles  10 Tandem stance  30sec R/L   TA set   NBOS eyes open    Glut Set   NBOS eyes closed    Hip Extension  10 Hand to Opposite Knee  10   Hip Adduction    Box Step     Hip IR/ER  10 Noodle Stance  30sec   Hip pendulums  10 Stop/Go Gait    Fig 8's  10 Switch Gait                Seated:  Functional:    Ankle Pumps  30 Step up forward  10   Ankle circles  10 Step up lateral  10    Knee flex & ext  30 Step down  10   Hip Abd & Adduction  30 [place] squats    Bicycle   30 Crate Lifts    Add Set with ball  10 Lunges forward    LX stab with med ball throws  Lunges lateral    Ankle INV  Lunges retro    Ankle EV  Lower ab curl with noodle      Upper ab curl with ball      Med ball straight lifts      Med ball diagonal lifts      Hydrorider          Noodle:      Leg Press  20 (large noodle) Deep Water:    Noodle hang at wall  Jog    Noodle hang deep water  Jumping Jacks    Noodle Bicycle  Heel to toe      Hand to opposite knee      Cross country skier      Rocking Horse      **Aquatic group therapy is the presence of more than 1 patient in the pool, at the same time. During that time each patient receives individualized instruction designed for their specific diagnosis.   Group Aquatic Timed Minutes:  40    Individual Aquatic Timed Minutes:  20    Treatment/Activity Tolerance:  [x] Patient tolerated treatment well [] Patient limited by fatique  [] Patient limited by pain  [] Patient limited by other medical complications  [x] Other: wants to be able to descend steps with the ball of his foot first, not his heel  Prognosis: [] Good [] Fair  [] Poor    Patient Requires Follow-up: [] Yes  [] No    Plan:   [x] Continue per plan of care [] Alter current plan (see comments)  [] Plan of care initiated [] Hold pending MD visit [] Discharge    Plan for Next Session:  With emphasis on stabilization, good posture and exercise technique, gradually progress spinal stabilization exercises to increase strength, flexibility and endurance and to decrease pain and improve function.   Add:  UE exercises for balance and stability     Electronically signed by: Lindy Ellison, PTA [id number]

## 2019-06-25 ENCOUNTER — HOSPITAL ENCOUNTER (OUTPATIENT)
Dept: PHYSICAL THERAPY | Age: 74
Setting detail: THERAPIES SERIES
Discharge: HOME | End: 2019-06-25
Payer: MEDICARE

## 2019-06-25 PROCEDURE — 97113 AQUATIC THERAPY/EXERCISES: CPT

## 2019-06-25 PROCEDURE — 97150 GROUP THERAPEUTIC PROCEDURES: CPT

## 2019-06-25 NOTE — FLOWSHEET NOTE
Physical Therapy Aquatic Flow Sheet   Date:  2019    Patient Name:  Alexandrea Godinez    :  1945    Restrictions/Precautions:   Medium fall risk - 2/2 dec balance  Pertinent Medical History: DDD, depression, hypothyroidism, LBP, THR 18,  L ankle fusion Dec 2018    Diagnosis:  R hip replacement/pain   Treatment Diagnosis:  Decreased functional mobility 2/2 L ankle fusion and R THR    Insurance/Certification information:  [facility]  Referring Physician: Janes 4 of care signed (Y/N):      Visit# / total visits:    Pain level: 1-2/10 Foot                          3-4/10 R hip/thigh     Progress Note: []  Yes  [x]  No  Next due by: Visit #10:      History of Injury: R THR 18, had home PT and outpt PT  -  - Currently feels that his ROM and strength could be better. Had a L ankle fusion in Dec, was using the rollabout and putting a lot of weight hopping on the R leg, and noted increased pain    Subjective:   States that the therapy has been helpful to both hip hip and his ankle. Hip pain is intermittent; gets it a lot with sit>stand.  Still having swelling in L LE, but not as bad as it had been    Objective:   Observation:  See eval   Test measurements:      Key  B= Belt DB= Dumbells T= Theratube   G=Gloves N= Noodles W= Weights   P= Paddles WW=Water Walker K= Kickboard        Transfers:   Steps  (ind)      % Immersion:  75            Ambulation:  Laps Exercises UE:      Forwards  6 Shoulder Shrugs     Lateral  4 Shoulder circles  10    Marching  2 Scapular Retraction  10    Retro  2 Rolling  10    Cariocas  Push Downs  10     IR/ER  10     Punching  10   Stretching:  Rowing  10   Gastroc/Soleus  5z41srn Elbow Flex/Ext  10   Hamstring  3t08bmz Shldr Flex/Ext  10   Knee flex stretch   Shldr Abd/Add  10   Piriformis   Horiz Abd/Add  10   Hip flexor   6t30wqq R Arm Circles  10   SKTC   PNF Diagonals    DKTC  UE oscillations f/b, s/s    ITB   Wall Push-ups    Quad   Figure 8's    Mid back   Buoy pull/push downs    UT  Tband rows    Rhom  Tband lats    Post Shoulder  Lumbar Rot w/ paddles    Pec   Small ball pull downs                   diagonals             Cervical:       AROM Flex       AROM Extension     LEs exercises:   AROM Side Bending    Marching  10 AROM Rotation    Heel Raises  10 Chin tucks    Toe Raises  10 Chin nods     Squats  10      Hamstring Curls  10      Hip Flexion  10 Balance: Hip Abduction  10 SLS  30sec R/L   Hip Circles  10 Tandem stance  30sec R/L   TA set   NBOS eyes open    Glut Set   NBOS eyes closed    Hip Extension  10 Hand to Opposite Knee  10   Hip Adduction    Box Step     Hip IR/ER  10 Noodle Stance  30sec   Hip pendulums  10 Stop/Go Gait    Fig 8's  10 Switch Gait                Seated:  Functional:    Ankle Pumps  30 Step up forward  10   Ankle circles  10 Step up lateral  10    Knee flex & ext  30 Step down  10   Hip Abd & Adduction  30 [place] squats    Bicycle   30 Crate Lifts    Add Set with ball  10 Lunges forward    LX stab with med ball throws  Lunges lateral    Ankle INV  Lunges retro    Ankle EV  Lower ab curl with noodle      Upper ab curl with ball      Med ball straight lifts      Med ball diagonal lifts      Hydrorider          Noodle:      Leg Press  20 (large noodle) Deep Water:    Noodle hang at wall  Jog    Noodle hang deep water  Jumping Jacks    Noodle Bicycle  Heel to toe      Hand to opposite knee      Cross country skier      Rocking Horse      **Aquatic group therapy is the presence of more than 1 patient in the pool, at the same time. During that time each patient receives individualized instruction designed for their specific diagnosis.   Group Aquatic Timed Minutes:  50    Individual Aquatic Timed Minutes:  20    Treatment/Activity Tolerance:  [x] Patient tolerated treatment well [] Patient limited by fatique  [] Patient limited by pain  [] Patient limited by other medical complications  [x] Other: wants to be able to descend steps with the ball of his foot first, not his heel  Prognosis: [] Good [] Fair  [] Poor    Patient Requires Follow-up: [] Yes  [] No    Plan:   [x] Continue per plan of care [] Alter current plan (see comments)  [] Plan of care initiated [] Hold pending MD visit [] Discharge    Plan for Next Session:  With emphasis on stabilization, good posture and exercise technique, gradually progress spinal stabilization exercises to increase strength, flexibility and endurance and to decrease pain and improve function.   Add:   REBEKAH HR    Electronically signed by: Lindy Ellison, PTA [id number]

## 2019-06-27 ENCOUNTER — HOSPITAL ENCOUNTER (OUTPATIENT)
Dept: PHYSICAL THERAPY | Age: 74
Setting detail: THERAPIES SERIES
Discharge: HOME | End: 2019-06-27
Payer: MEDICARE

## 2019-06-27 PROCEDURE — 97150 GROUP THERAPEUTIC PROCEDURES: CPT

## 2019-06-27 PROCEDURE — 97113 AQUATIC THERAPY/EXERCISES: CPT

## 2019-06-27 NOTE — FLOWSHEET NOTE
Physical Therapy Aquatic Flow Sheet   Date:  2019    Patient Name:  Fatuma Tello    :  1945    Restrictions/Precautions:   Medium fall risk - 2/2 dec balance  Pertinent Medical History: DDD, depression, hypothyroidism, LBP, THR 18,  L ankle fusion Dec 2018    Diagnosis:  R hip replacement/pain   Treatment Diagnosis:  Decreased functional mobility 2/2 L ankle fusion and R THR    Insurance/Certification information:  [employer] Medicare  Referring Physician: Janes 4 of care signed (Y/N):      Visit# / total visits:    Pain level: 3-4/10 Foot                          3-4/10 R hip/thigh     Progress Note: []  Yes  [x]  No  Next due by: Visit #10:      History of Injury: R THR 18, had home PT and outpt PT  -  - Currently feels that his ROM and strength could be better. Had a L ankle fusion in Dec, was using the rollabout and putting a lot of weight hopping on the R leg, and noted increased pain    Subjective:     Both hip and ankle pain is intermittent, but gets ankle pain more frequently than hip pain      Objective:   Observation:  See eval   Test measurements:      Key  B= Belt DB= Dumbells T= Theratube   G=Gloves N= Noodles W= Weights   P= Paddles WW=Water Walker K= Kickboard        Transfers:   Steps  (ind)      % Immersion:  75            Ambulation:  Laps Exercises UE:      Forwards  6 Shoulder Shrugs     Lateral  4 Shoulder circles    Marching  2 Scapular Retraction    Retro  2 Rolling    Cariocas  Push Downs     IR/ER     Punching   Stretching:  Rowing   Gastroc/Soleus  2q36lxa Elbow Flex/Ext   Hamstring  2z84uky Shldr Flex/Ext   Knee flex stretch   Shldr Abd/Add   Piriformis   Horiz Abd/Add   Hip flexor   6x53ttq R Arm Circles   SKTC   PNF Diagonals    DKTC  UE oscillations f/b, s/s    ITB   Wall Push-ups    Quad   Figure 8's    Mid back   Buoy pull/push downs    UT  Tband rows    Rhom  Tband lats    Post Shoulder  Lumbar Rot w/ paddles    Pec   Small ball pull downs                   diagonals             Cervical:       AROM Flex       AROM Extension     LEs exercises:   AROM Side Bending    Marching  10 AROM Rotation    Heel Raises  10  S/L R/L Chin tucks    Toe Raises  10 Chin nods     Squats  10      Hamstring Curls  10      Hip Flexion  10 Balance: Hip Abduction  10 SLS  30sec R/L   Hip Circles  10 Tandem stance  30sec R/L   TA set   NBOS eyes open    Glut Set   NBOS eyes closed    Hip Extension  10 Hand to Opposite Knee  10   Hip Adduction    Box Step     Hip IR/ER  10 Noodle Stance  30sec   Hip pendulums  10 Stop/Go Gait    Fig 8's  10 Switch Gait                Seated:  Functional:    Ankle Pumps  30 Step up forward  10   Ankle circles  10 Step up lateral  10    Knee flex & ext  30 Step down  10   Hip Abd & Adduction  30 [place] squats    Bicycle   30 Crate Lifts    Add Set with ball  10 Lunges forward    LX stab with med ball throws  Lunges lateral    Ankle INV  Lunges retro    Ankle EV  Lower ab curl with noodle      Upper ab curl with ball      Med ball straight lifts      Med ball diagonal lifts      Hydrorider          Noodle:      Leg Press  20 (large noodle) Deep Water:    Noodle hang at wall  Jog    Noodle hang deep water  Jumping Jacks    Noodle Bicycle  Heel to toe      Hand to opposite knee      Cross country skier      Rocking Horse      **Aquatic group therapy is the presence of more than 1 patient in the pool, at the same time. During that time each patient receives individualized instruction designed for their specific diagnosis.   Group Aquatic Timed Minutes:  40    Individual Aquatic Timed Minutes:  20    Treatment/Activity Tolerance:  [x] Patient tolerated treatment well [] Patient limited by fatique  [] Patient limited by pain  [] Patient limited by other medical complications  [x] Other: UE ex not done due to time constraint  Prognosis: [] Good [] Fair  [] Poor    Patient Requires Follow-up: [] Yes  [] No    Plan:   [x] Continue per plan of care [] Alter current plan (see comments)  [] Plan of care initiated [] Hold pending MD visit [] Discharge    Plan for Next Session:  With emphasis on stabilization, good posture and exercise technique, gradually progress spinal stabilization exercises to increase strength, flexibility and endurance and to decrease pain and improve function.   Add:   Box step    Electronically signed by: Misha Amaya, PTA [id number]

## 2019-07-02 ENCOUNTER — HOSPITAL ENCOUNTER (OUTPATIENT)
Dept: PHYSICAL THERAPY | Age: 74
Setting detail: THERAPIES SERIES
Discharge: HOME | End: 2019-07-02
Payer: MEDICARE

## 2019-07-02 PROCEDURE — 97113 AQUATIC THERAPY/EXERCISES: CPT

## 2019-07-02 PROCEDURE — 97150 GROUP THERAPEUTIC PROCEDURES: CPT

## 2019-07-02 NOTE — FLOWSHEET NOTE
Tband lats    Post Shoulder  Lumbar Rot w/ paddles    Pec   Small ball pull downs                   diagonals             Cervical:       AROM Flex       AROM Extension     LEs exercises:   AROM Side Bending    Marching  10 AROM Rotation    Heel Raises  10  S/L R/L Chin tucks    Toe Raises  10 Chin nods     Squats  10      Hamstring Curls  10      Hip Flexion  10 Balance: Hip Abduction  10 SLS  30sec R/L   Hip Circles  10 Tandem stance  30sec R/L   TA set   NBOS eyes open    Glut Set   NBOS eyes closed    Hip Extension  10 Hand to Opposite Knee  10   Hip Adduction    Box Step  3x R/L   Hip IR/ER Noodle Stance  30sec   Hip pendulums Stop/Go Gait    Fig 8's Switch Gait                Seated:  Functional:    Ankle Pumps  30 Step up forward  10   Ankle circles  10 Step up lateral  10    Knee flex & ext  30 Step down  10   Hip Abd & Adduction  30 [place] squats    Bicycle   30 Crate Lifts    Add Set with ball  10 Lunges forward    LX stab with med ball throws  Lunges lateral    Ankle INV  Lunges retro    Ankle EV  Lower ab curl with noodle      Upper ab curl with ball      Med ball straight lifts      Med ball diagonal lifts      Hydrorider          Noodle:      Leg Press  20 (large noodle) Deep Water:    Noodle hang at wall  Jog    Noodle hang deep water  Jumping Jacks    Noodle Bicycle  Heel to toe      Hand to opposite knee      Cross country skier      Rocking Horse      **Aquatic group therapy is the presence of more than 1 patient in the pool, at the same time. During that time each patient receives individualized instruction designed for their specific diagnosis.   Group Aquatic Timed Minutes:  40    Individual Aquatic Timed Minutes:  30    Treatment/Activity Tolerance:  [x] Patient tolerated treatment well [] Patient limited by fatique  [] Patient limited by pain  [] Patient limited by other medical complications  [] Other:   Prognosis: [] Good [] Fair  [] Poor    Patient Requires Follow-up: [] Yes  []

## 2019-07-09 ENCOUNTER — HOSPITAL ENCOUNTER (OUTPATIENT)
Dept: PHYSICAL THERAPY | Age: 74
Setting detail: THERAPIES SERIES
Discharge: HOME | End: 2019-07-09
Payer: MEDICARE

## 2019-07-09 PROCEDURE — 97113 AQUATIC THERAPY/EXERCISES: CPT

## 2019-07-09 PROCEDURE — 97150 GROUP THERAPEUTIC PROCEDURES: CPT

## 2019-07-09 NOTE — FLOWSHEET NOTE
diagonals             Cervical:       AROM Flex       AROM Extension     LEs exercises:   AROM Side Bending    Marching  10 AROM Rotation    Heel Raises  10  S/L R/L Chin tucks    Toe Raises  10 Chin nods     Squats  10      Hamstring Curls  10      Hip Flexion  10 Balance: Hip Abduction  10 SLS  30sec R/L   Hip Circles  10 Tandem stance  30sec R/L   TA set   NBOS eyes open    Glut Set   NBOS eyes closed    Hip Extension  10 Hand to Opposite Knee  10   Hip Adduction    Box Step  3x R/L   Hip IR/ER Noodle Stance  60sec   Hip pendulums Stop/Go Gait    Fig 8's Switch Gait                Seated:  Functional:    Ankle Pumps  30 Step up forward  10   Ankle circles  10 Step up lateral  10    Knee flex & ext  30 Step down  10   Hip Abd & Adduction  30 [place] squats    Bicycle   30 Crate Lifts  10   Add Set with ball  10 Lunges forward    LX stab with med ball throws  Lunges lateral    Ankle INV  Lunges retro    Ankle EV  Lower ab curl with noodle      Upper ab curl with ball      Med ball straight lifts      Med ball diagonal lifts      Hydrorider          Noodle:      Leg Press  20 (large noodle) Deep Water:    Noodle hang at wall  Jog    Noodle hang deep water  Jumping Jacks    Noodle Bicycle  Heel to toe      Hand to opposite knee      Cross country skier      Rocking Horse      **Aquatic group therapy is the presence of more than 1 patient in the pool, at the same time. During that time each patient receives individualized instruction designed for their specific diagnosis.   Group Aquatic Timed Minutes:  40    Individual Aquatic Timed Minutes:  20    Treatment/Activity Tolerance:  [x] Patient tolerated treatment well [] Patient limited by fatique  [] Patient limited by pain  [] Patient limited by other medical complications  [x] Other: discomfort leading with toes going down stairs  Prognosis: [] Good [] Fair  [] Poor    Patient Requires Follow-up: [] Yes  [] No    Plan:   [x] Continue per plan of care []

## 2019-07-11 ENCOUNTER — HOSPITAL ENCOUNTER (OUTPATIENT)
Dept: PHYSICAL THERAPY | Age: 74
Setting detail: THERAPIES SERIES
Discharge: HOME | End: 2019-07-11
Payer: MEDICARE

## 2019-07-11 NOTE — FLOWSHEET NOTE
Physical Therapy  Cancellation/No-show Note  Patient Name:  Ana Freed  :  1945   Date:  2019  Cancelled visits to date: 1  No-shows to date: 0    For today's appointment patient:  [x]  Cancelled  []  Rescheduled appointment  []  No-show     Reason given by patient:  []  Patient ill  []  Conflicting appointment  []  No transportation    []  Conflict with work  [x]  No reason given  []  Other:     Comments:      Electronically signed by:  Javier Mandel, PTA [id number]

## 2019-07-16 ENCOUNTER — HOSPITAL ENCOUNTER (OUTPATIENT)
Dept: PHYSICAL THERAPY | Age: 74
Setting detail: THERAPIES SERIES
Discharge: HOME | End: 2019-07-16
Payer: MEDICARE

## 2019-07-16 PROCEDURE — 97113 AQUATIC THERAPY/EXERCISES: CPT

## 2019-07-16 PROCEDURE — 97150 GROUP THERAPEUTIC PROCEDURES: CPT

## 2019-07-16 NOTE — FLOWSHEET NOTE
Physical Therapy Aquatic Flow Sheet   Date:  2019    Patient Name:  Gregory Junior    :  1945    Restrictions/Precautions:   Medium fall risk - 2/2 dec balance  Pertinent Medical History: DDD, depression, hypothyroidism, LBP, THR 18,  L ankle fusion Dec 2018    Diagnosis:  R hip replacement/pain   Treatment Diagnosis:  Decreased functional mobility 2/2 L ankle fusion and R THR    Insurance/Certification information:  [employer] Medicare  Referring Physician: Janes 4 of care signed (Y/N):      Visit# / total visits:   +   Pain level: 0/10 Foot                          0/10 R hip/thigh     Progress Note: []  Yes  [x]  No  Next due by: Visit #10:      History of Injury: R THR 18, had home PT and outpt PT  -  - Currently feels that his ROM and strength could be better.  Had a L ankle fusion in Dec, was using the rollabout and putting a lot of weight hopping on the R leg, and noted increased pain    Subjective:    Not having any pain today       Objective:   Observation:  See eval   Test measurements:      Key  B= Belt DB= Dumbells T= Theratube   G=Gloves N= Noodles W= Weights   P= Paddles WW=Water Walker K= Kickboard        Transfers:   Steps  (ind)      % Immersion:  75            Ambulation:  Laps Exercises UE:      Forwards  6 Shoulder Shrugs     Lateral  4 Shoulder circles    Marching  2 Scapular Retraction    Retro  2 Rolling    Cariocas  Push Downs     IR/ER     Punching   Stretching:  Rowing   Gastroc/Soleus  6r07dmg Elbow Flex/Ext   Hamstring  9o10ptx Shldr Flex/Ext   Knee flex stretch   Shldr Abd/Add   Piriformis   Horiz Abd/Add   Hip flexor   3b68mim R Arm Circles   SKTC   PNF Diagonals   DKTC  UE oscillations f/b, s/s    ITB   Wall Push-ups    Quad   Figure 8's    Mid back   Buoy pull/push downs    UT  Tband rows    Rhom  Tband lats    Post Shoulder  Lumbar Rot w/ paddles    Pec   Small ball pull downs                   diagonals             Cervical:       AROM Flex       AROM Extension     LEs exercises:   AROM Side Bending    Marching  10 AROM Rotation    Heel Raises  10  S/L R/L Chin tucks    Toe Raises  10 Chin nods     Squats  10      Hamstring Curls  10      Hip Flexion  10 Balance: Hip Abduction  10 SLS  30sec R/L   Hip Circles  10 Tandem stance  30sec R/L   TA set   NBOS eyes open    Glut Set   NBOS eyes closed    Hip Extension  10 Hand to Opposite Knee  10   Hip Adduction    Box Step  3x R/L   Hip IR/ER Noodle Stance  60sec   Hip pendulums Stop/Go Gait    Fig 8's Switch Gait                Seated:  Functional:    Ankle Pumps  30 Step up forward  10   Ankle circles  10 Step up lateral  10    Knee flex & ext  30 Step down  10   Hip Abd & Adduction  30 [place] squats    Bicycle   30 Crate Lifts  10   Add Set with ball  10 Lunges forward  5 R/L   LX stab with med ball throws  Lunges lateral  5 R/L   Ankle INV  Lunges retro    Ankle EV  Lower ab curl with noodle      Upper ab curl with ball      Med ball straight lifts      Med ball diagonal lifts      Hydrorider          Noodle:      Leg Press  20 (large noodle) Deep Water:    Noodle hang at wall  Jog    Noodle hang deep water  Jumping Jacks    Noodle Bicycle  Heel to toe      Hand to opposite knee      Cross country skier      Rocking Horse      **Aquatic group therapy is the presence of more than 1 patient in the pool, at the same time. During that time each patient receives individualized instruction designed for their specific diagnosis.   Group Aquatic Timed Minutes:  50    Individual Aquatic Timed Minutes:  20    Treatment/Activity Tolerance:  [x] Patient tolerated treatment well [] Patient limited by fatique  [] Patient limited by pain  [] Patient limited by other medical complications  [x] Other: Still unable to lead with toes L foot when descending stairs    Prognosis: [] Good [] Fair  [] Poor    Patient Requires Follow-up: [] Yes  [] No    Plan:   [] Continue per plan of care [] Alter current plan (see comments)  [] Plan of care initiated [] Hold pending MD visit [x] Discharge    Plan for Next Session:  D/C with HEP and 30 day pass to fitness center  Electronically signed by: Jose Arroyo, PTA [id number]

## 2022-05-31 ENCOUNTER — OFFICE VISIT (OUTPATIENT)
Dept: ORTHOPEDIC SURGERY | Age: 77
End: 2022-05-31
Payer: MEDICARE

## 2022-05-31 VITALS — BODY MASS INDEX: 32.96 KG/M2 | WEIGHT: 210 LBS | HEIGHT: 67 IN

## 2022-05-31 DIAGNOSIS — M54.50 LUMBAR PAIN: Primary | ICD-10-CM

## 2022-05-31 DIAGNOSIS — M51.36 LUMBAR DEGENERATIVE DISC DISEASE: ICD-10-CM

## 2022-05-31 DIAGNOSIS — M70.61 GREATER TROCHANTERIC BURSITIS OF RIGHT HIP: ICD-10-CM

## 2022-05-31 DIAGNOSIS — M43.06 LUMBAR SPONDYLOLYSIS: ICD-10-CM

## 2022-05-31 DIAGNOSIS — M76.31 IT BAND SYNDROME, RIGHT: ICD-10-CM

## 2022-05-31 PROCEDURE — 20610 DRAIN/INJ JOINT/BURSA W/O US: CPT | Performed by: FAMILY MEDICINE

## 2022-05-31 PROCEDURE — 99203 OFFICE O/P NEW LOW 30 MIN: CPT | Performed by: FAMILY MEDICINE

## 2022-05-31 PROCEDURE — L0625 LO FLEX L1-BELOW L5 PRE OTS: HCPCS | Performed by: FAMILY MEDICINE

## 2022-05-31 RX ORDER — IBUPROFEN 800 MG/1
800 TABLET ORAL
Qty: 90 TABLET | Refills: 3 | Status: SHIPPED | OUTPATIENT
Start: 2022-05-31

## 2022-05-31 RX ORDER — BUPIVACAINE HYDROCHLORIDE 2.5 MG/ML
1 INJECTION, SOLUTION INFILTRATION; PERINEURAL ONCE
Status: COMPLETED | OUTPATIENT
Start: 2022-05-31 | End: 2022-05-31

## 2022-05-31 RX ORDER — BETAMETHASONE SODIUM PHOSPHATE AND BETAMETHASONE ACETATE 3; 3 MG/ML; MG/ML
6 INJECTION, SUSPENSION INTRA-ARTICULAR; INTRALESIONAL; INTRAMUSCULAR; SOFT TISSUE ONCE
Status: COMPLETED | OUTPATIENT
Start: 2022-05-31 | End: 2022-05-31

## 2022-05-31 RX ORDER — LIDOCAINE HYDROCHLORIDE 10 MG/ML
1 INJECTION, SOLUTION INFILTRATION; PERINEURAL ONCE
Status: COMPLETED | OUTPATIENT
Start: 2022-05-31 | End: 2022-05-31

## 2022-05-31 RX ADMIN — LIDOCAINE HYDROCHLORIDE 1 ML: 10 INJECTION, SOLUTION INFILTRATION; PERINEURAL at 11:24

## 2022-05-31 RX ADMIN — BUPIVACAINE HYDROCHLORIDE 2.5 MG: 2.5 INJECTION, SOLUTION INFILTRATION; PERINEURAL at 11:23

## 2022-05-31 RX ADMIN — BETAMETHASONE SODIUM PHOSPHATE AND BETAMETHASONE ACETATE 6 MG: 3; 3 INJECTION, SUSPENSION INTRA-ARTICULAR; INTRALESIONAL; INTRAMUSCULAR; SOFT TISSUE at 11:22

## 2022-05-31 NOTE — PROGRESS NOTES
Chief Complaint  Lower Back Pain (OPNP LUMBAR/R HIP)      Initial consultation new onset right lower back and gluteal pain with left lateral hip pain    History of Present Illness:  Sam Simmons is a 78 y.o. male who is a retired white male who is a former  at [employer] who also had worked for the [employer] who [facility] who is being seen today in kind consultation from Bhumi Houston PA-C for evaluation of newer onset of pain to his right lower back gluteal region and lateral aspect of his right hip. He is status post right hip arthroplasty by Dr. Perla amador in 2018 and has had residual weakness to his left hip and leg since that time. He states that on roughly 5 the weekend of 5/15/2022, they were doing some frequent walking and aquatics exercises while vacationing in [facility] and did take some fairly steep 2 to 4 mile hikes and shortly thereafter began to notice pain into his lower back right gluteal region lateral aspect of his hip. There is no history of definitive fall or trauma but he has had a fairly consistent achy pain to his right gluteal region and lateral aspect of his hip with sharper 8 out of 10 pain with positional changes and walking. He is not really complaining of distal right leg numbness or tingling or achiness. He did see Bhumi Houston PA-C recently in the office last week and placed him on a Medrol pack which was ineffective prompting today's consultation. He has not had any type of physical therapy and does deny neurogenic bowel or bladder symptoms. He is being seen today for orthopedic and sports consultation with imaging.      Pain Assessment  Location of Pain: Back  Location Modifiers: Right,Posterior  Severity of Pain: 8  Quality of Pain: Aching,Dull,Sharp  Duration of Pain: Persistent  Frequency of Pain: Constant  Limiting Behavior: Yes  Relieving Factors: Rest  Result of Injury: No  Are there other pain locations you wish to document?: No       Medical History  Patient's medications, allergies, past medical, surgical, social and family histories were reviewed and updated as appropriate. Review of Systems  Pertinent items are noted in HPI  Review of systems reviewed from Patient History Form dated on 5/31/2022 and available in the patient's chart under the Media tab. Vital Signs  There were no vitals filed for this visit. General Exam:     Constitutional: Patient is adequately groomed with no evidence of malnutrition  DTRs: Deep tendon reflexes are intact  Mental Status: The patient is oriented to time, place and person. The patient's mood and affect are appropriate. Lymphatic: The lymphatic examination bilaterally reveals all areas to be without enlargement or induration. Vascular: Examination reveals no swelling or calf tenderness. Peripheral pulses are palpable and 2+. Neurological: The patient has good coordination. There is no weakness or sensory deficit. Lumbar/Sacral Spine Examination  Inspection: There is no high-grade deformity or substantial soft tissue swelling. He is fairly tight. Palpation: Does have clinical tenderness over the lower lumbar paraspinals lower lumbar facets right greater than left with central gluteal and lateral gluteal tenderness on the right. He is also tender of the IT band and greater trochanteric bursa on the right. Rang of Motion: He can forward flex to about 50-60. He does have limitations in extension. h extension to the right does produce some lower back and right gluteal discomfort. 25% reduction in lateral bending rotation. Strength:  There does not appear to be focal lower extremity weakness although he does have 4 to 4+ out of 5 strength loss with hip flexion and abduction on the right      Special Tests: His straight leg raising and logroll testing both appear to be negative although he does have a trace positive Saul's and tenderness over the trochanteric bursa on the right. Skin: There are no rashes, ulcerations or lesions. Distal motor sensory and vascular exams intact. Gait: Mild antalgia. Reflexes:  Symmetrically preserved     Additional Comments:     Additional Examinations:  Contralateral Exam: Examination of the left hip reveals intact skin. The patient demonstrates full painless range of motion with regards to flexion, abduction, internal and external rotation. There is not tenderness about the greater trochanter. There is a negative straight leg raise against resistance. Strength is 5/5 thorough out all planes. Right Lower Extremity: Examination of the right lower extremity does not show any tenderness, deformity or injury. Range of motion is unremarkable. There is no gross instability. There are no rashes, ulcerations or lesions. Strength and tone are normal.  Left Lower Extremity: Examination of the left lower extremity does not show any tenderness, deformity or injury. Range of motion is unremarkable. There is no gross instability. There are no rashes, ulcerations or lesions. Strength and tone are normal.      Diagnostic Test Findings: AP and lateral lumbar spine films were obtained today and does show multilevel lumbar degenerative disc disease with facet arthropathy and loss of lumbar lordosis. No evidence of high-grade acute compression injury. Assessment: #1.  2-week status post on rehabilitated aggravation mechanical low back pain with underlying lumbar degenerative disc disease spondylolysis and suspected gluteal tendinopathy with reactive right greater trochanteric bursitis and right hip IT band 4 years status post right total hip arthroplasty    Impression:  Encounter Diagnoses   Name Primary?     Lumbar pain Yes    Lumbar degenerative disc disease     Lumbar spondylolysis     Greater trochanteric bursitis of right hip     It band syndrome, right        Office Procedures:  Orders Placed This Encounter   Procedures    XR LUMBAR SPINE (2-3 VIEWS)     Standing Status:   Future     Number of Occurrences:   1     Standing Expiration Date:   5/31/2023     Order Specific Question:   Reason for exam:     Answer:   pain    Ambulatory referral to Physical Therapy     Referral Priority:   Routine     Referral Type:   Eval and Treat     Referral Reason:   Specialty Services Required     Number of Visits Requested:   1    WA ARTHROCENTESIS ASPIR&/INJ MAJOR JT/BURSA W/O US    Bird and Gumaro Extensor Lumbosacral Support Brace (Warm and Form)     Patient was prescribed a Bird and Gumaro Extensor Lumbosacral Support Brace with a pocket. This orthosis is required for the following reasons:    Reduce pain by restricting mobility of the trunk  Facilitate healing following an injury to the spine or related soft tissues  Support weak spinal muscles    The patient was educated and fit by a healthcare professional with expert knowledge and specialization in brace application while under the direct supervision of the treating physician. Verbal and written instructions for the use of and application of this item were provided. They were instructed to contact the office immediately should the brace result in increased pain, decreased sensation, increased swelling or worsening of the condition. Treatment Plan:  Treatment options were discussed with Zulma Solis. We did review his plain films and exam findings. He became symptomatic with pain in his right lower back and gluteal region as well as lateral hip after returning from vacation to [facility] in which they were doing walking and hiking as well as aquatic exercises. There may be some overlap in his symptoms as I do think he does have some degree of greater trochanteric bursitis which was injected today on the right using 1 cc of Celestone, 1 cc Marcaine, 1 cc of Xylocaine.   We did place him in a warm-and-form belt we will send in physical therapy for his lumbar spine and his right hip gluteals and hip strengthening and flexibility exercises. We placed him on ibuprofen 800 mg 3 times daily we will take this consistently we will see him back in a few weeks for follow-up. We will consider lumbar imaging and/or hip imaging should remain symptomatic. He will contact us in the interim with questions or concerns. CC: Dr. Nitesh Darling and Maddie Smith PA-C      This dictation was performed with a verbal recognition program [facility]) and it was checked for errors. It is possible that there are still dictated errors within this office note. If so, please bring any errors to my attention for an addendum. All efforts were made to ensure that this office note is accurate.

## 2022-06-01 ENCOUNTER — HOSPITAL ENCOUNTER (OUTPATIENT)
Dept: PHYSICAL THERAPY | Age: 77
Setting detail: THERAPIES SERIES
Discharge: HOME | End: 2022-06-01
Payer: MEDICARE

## 2022-06-01 PROCEDURE — 97110 THERAPEUTIC EXERCISES: CPT | Performed by: PHYSICAL THERAPIST

## 2022-06-01 PROCEDURE — 97161 PT EVAL LOW COMPLEX 20 MIN: CPT | Performed by: PHYSICAL THERAPIST

## 2022-06-01 PROCEDURE — 97112 NEUROMUSCULAR REEDUCATION: CPT | Performed by: PHYSICAL THERAPIST

## 2022-06-01 NOTE — FLOWSHEET NOTE
Seated forward flexion     Cat/camel     Hip flexor stretch half kneeling     Hip flexor stretch supine          Neural mobilization:          Strengthening/stabilization:     Supine SLRs 10 x 3 Added 6/1   Hip ABD isometric 10 sec x 10 Added 6/1   bridge 10 x 3 Added 6/1   Pelvic tilts     TrA contraction     TrA with ball squeeze     TrA contraction with alt marches     TrA with alt 90/90 heel taps     TrA with alt LE extn     Prone alt UE lift     Prone alt UE/LE lift     Prone towel squeeze with B leg lift      Prone B hip extn with knees out and feet together     Prone leaning over edge of table hip extn     Supine B hip abd with TB     clams     Standing clam with back foot against wall     Bridge- DL     Bridge with march     SL bridge     Bridge on [facility] on [place]     sidelying hip abd     Prone hip extn          Quadruped position with knee on airex with opposite hip lift     Bird/dog     Quadruped with hip abd     Quadruped with hip circles     Quadruped with foot against ball on wall     Opposite UE to LE isometric core     Leg drop hold     Saman System with glute set     Half kneeling on airex glute set + stabilization exercise     Lunge position hold with stabilization exercise     Hip abduction isometric into wall standing     Tandem stance + stabilization exercise     SLS     High march with opposite hip stabilization standing     TrA/mutlifidi walk outs     TrA/multifidi push outs     Scapular retraction     Seated SB marches     Prone Ts     squats     Leg press     Lateral band walks     SB pikes     SB knee tucks     SB roll outs     planks     Side planks          Manual treatment:          sidelying lumbopelvic roll Prone IASTM Sidelying STM - hypervolt              Therapeutic Exercise and NMR EXR  [x] (94117) Provided verbal/tactile cueing for activities related to strengthening, flexibility, endurance, ROM  for improvements in proximal hip and core control with self care, mobility, lifting and ambulation. [x] (15914) Provided verbal/tactile cueing for activities related to improving balance, coordination, kinesthetic sense, posture, motor skill, proprioception  to assist with core control in self care, mobility, lifting, and ambulation. Therapeutic Activities:    [x] (41094 or 81127) Provided verbal/tactile cueing for activities related to improving balance, coordination, kinesthetic sense, posture, motor skill, proprioception and motor activation to allow for proper function  with self care and ADLs  [] (35150) Provided training and instruction to the patient for proper core and proximal hip recruitment and positioning with ambulation re-education     Home Exercise Program:    [x] (19476) Reviewed/Progressed HEP activities related to strengthening, flexibility, endurance, ROM of core, proximal hip and LE for functional self-care, mobility, lifting and ambulation   [] (23482) Reviewed/Progressed HEP activities related to improving balance, coordination, kinesthetic sense, posture, motor skill, proprioception of core, proximal hip and LE for self care, mobility, lifting, and ambulation      Manual Treatments:  PROM / STM / Oscillations-Mobs:  G-I, II, III, IV (PA's, Inf., Post.)  [] (70117) Provided manual therapy to mobilize proximal hip and LS spine soft tissue/joints for the purpose of modulating pain, promoting relaxation,  increasing ROM, reducing/eliminating soft tissue swelling/inflammation/restriction, improving soft tissue extensibility and allowing for proper ROM for normal function with self care, mobility, lifting and ambulation. Modalities:     [] GAME READY (VASO)- for significant edema, swelling, pain control.      Charges:  Timed Code Treatment Minutes: 40'    Total Treatment Minutes: 79'       [x] EVAL (LOW) 29335 (typically 20 minutes face-to-face)  [] EVAL (MOD) 73682 (typically 30 minutes face-to-face)  [] EVAL (HIGH) 31165 (typically 45 minutes face-to-face)  [] RE-EVAL     [x] LK(62482) x 1    [] IONTO  [x] NMR (82625) x  1   [] VASO  [] Manual (01460) x     [] Other:  [] TA x      [] Mech Traction (47310)  [] ES(attended) (20115)      [] ES (un) (94553):         ASSESSMENT:  See eval 6/1    Goals:  Patient stated goal: alleviate current pain and leg and lower body strengthening     [] Progressing: [] Met: [] Not Met: [] Adjusted    Therapist goals for Patient:   Short Term Goals: To be achieved in: 2 weeks  1. Independent in HEP and progression per patient tolerance, in order to prevent re-injury. [] Progressing: [] Met: [] Not Met: [] Adjusted   2. Patient will have a decrease in pain to facilitate improvement in movement, function, and ADLs as indicated by Functional Deficits. [] Progressing: [] Met: [] Not Met: [] Adjusted    Long Term Goals: To be achieved in: 4-6 weeks  1. Patient will score greater than or equal to 49/100 to assist with reaching prior level of function. [] Progressing: [] Met: [] Not Met: [] Adjusted  2. Patient will demonstrate increased R hip IR and ER AROM to greater than or equal to 30 deg to allow for proper joint functioning as indicated by patients Functional Deficits. [] Progressing: [] Met: [] Not Met: [] Adjusted  3. Patient will demonstrate an increase in R hip (flex, ABD, and ext) strength to 4+/5 to allow for proper functional mobility as indicated by patients Functional Deficits. [] Progressing: [] Met: [] Not Met: [] Adjusted  4. Patient will return to ambulating stairs without increased symptoms or restriction. [] Progressing: [] Met: [] Not Met: [] Adjusted  5. Patient will report returning to independent workout routine without any pain. [] Progressing: [] Met: [] Not Met: [] Adjusted     Overall Progression Towards Functional goals/ Treatment Progress Update:  [] Patient is progressing as expected towards functional goals listed. [] Progression is slowed due to complexities/Impairments listed.   [] Progression has been slowed due to co-morbidities. [x] Plan just implemented, too soon to assess goals progression <30days   [] Goals require adjustment due to lack of progress  [] Patient is not progressing as expected and requires additional follow up with physician  [] Other    Prognosis for POC: [x] Good [] Fair  [] Poor      Patient requires continued skilled intervention: [x] Yes  [] No      Treatment/Activity Tolerance:  [x] Patient tolerated treatment well [] Patient limited by fatique  [] Patient limited by pain  [] Patient limited by other medical complications  [] Other:     Patient education: Patient education on PT and plan of care including diagnosis, prognosis, treatment goals and options. Patient agrees with discussed POC and treatment and is aware of rehab process. 6/1      PLAN: See eval 6/1  [] Continue per plan of care [] Alter current plan (see comments above)  [x] Plan of care initiated [] Hold pending MD visit [] Discharge      Electronically signed by:  Caro Fletcher PT, DPT     Note: If patient does not return for scheduled/ recommended follow up visits, this note will serve as a discharge from care along with most recent update on progress.

## 2022-06-01 NOTE — PLAN OF CARE
[facility], [address] [place], [address]  Phone: [phone]   Fax: [phone]      Amber Daniel    Dear Dr. Tomás Comer,    We had the pleasure of evaluating the following patient for physical therapy services at [address] [place] [address]. A summary of our findings can be found in the initial assessment below. This includes our plan of care. If you have any questions or concerns regarding these findings, please do not hesitate to contact me at the office phone number checked above. Thank you for the referral.       Physician Signature:_______________________________Date:__________________  By signing above (or electronic signature), therapists plan is approved by physician      Patient: Yanet Garcia   : 1945   MRN: [id number]  Referring Physician: Dr. Tomás Comer      Evaluation Date: 2022      Medical Diagnosis Information:  Diagnosis: Lumbar pain - M54.50; Lumbar degenerative disc disease - M51.36, Lumbar spondylolysis - M43.06, Greater trochanteric bursitis of right hip - M70.61;  It band syndrome, right - M76.31   Treatment Diagnosis: decreased ROM, strength, balance, and high-level iADLs                                         Insurance information: PT Insurance Information: [facility]     C-SSRS Triggered by Intake questionnaire (Past 2 wk assessment):   [x] No, Questionnaire did not trigger screening.   [] Yes, Patient intake triggered further evaluation      [] C-SSRS Screening completed  [] PCP notified via Plan of Care  [] Emergency services notified     Precautions/ Contra-indications:   Latex Allergy:  [x]NO      []YES  Preferred Language for Healthcare:   [x]English       []other:    SUBJECTIVE: Patient stated complaint: Pt stated he went on a trip in the past month where they went on a 4 mile hike after doing 4 days of water exercises and felt like this was the cause of the discomfort a few days after that made the pt seek assistance. Pt stated he had some shooting and stabbing pain in his R hip and stays in his R hip/buttock area. States he saw PCP who referred him for ortho consult. He went to see Dr. Eduardo Umaña, who ordered a knee brace, which is helping, and gave the patient a cortisone shot. States he was taking 800 mg ibuprofen 3 times a day. States he has done minimal physical activity since the beginning of COVID and has done pool exercise. CLOF: Pt states he has to do stairs bringing feet together, getting up off the floor is very difficult. 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 First steps in the morning he states he is off balance. This has been going on since 2018 when he had foot sx to insert a plate to correct foot position. L ankle has been sore since sx. States he uses grab bars in the shower, and is able to get through a long car ride and is tolerable to prolonged position. States he wants to return to regular exercise, but overall has been able to keep a routine throughout the last couple years. States he has difficulty doing squats. Red Flags  () numbness and tingling  () bilateral radicular symptoms  () bowel and/or bladder changes  () Saddle Anesthesia  () Ataxia  () night pain  () rapid changes in weight of >10 lbs  () Hx of cancer  () Recent trauma  () Hx of corticosteroid use  () Fever      Relevant Medical History: R total hip - 2018, L foot triple surgery - 2018       Functional Disability Index/G-Codes:   FOTO Lumbar Spine  6/1 - 43/100       Pain Scale: 8/10 at its worst. 3/10 right now. Easing factors: Occasional heat and ice helps a little bit. Has a cortisone shot and 800 mg ibuprofen 2x per day. Provocative factors: Bending over, stairs, getting off from floor.      Type: [x]Constant   []Intermittent  []Radiating []Localized []other:     Numbness/Tingling: States he has \"weird walking on a piano sensation in the morning\"     Occupation/School: Retired    Social hx/hobbies: States he likes to work around [employer]. States he can over do yard work and has to take breaks. States he enjoys going out to eat with his wife. Living Status/Prior Level of Function: Independent with ADLs and IADLs, states he uses grab bars in the shower, and is able to get through a long car ride and is tolerable to prolonged position. States he wants to return to regular exercise, but overall has been able to keep a routine throughout the last couple years. 3 stairs primary entrance, 2 regular flights of stairs in the home.      OBJECTIVE:       Standing Exam Normal Abnormal N/A Comments   Toe walk         Heel Walk       Side bending Normal      Pelvic Height       Fwd Bend- (aberrant juttering or innominate mvmt) Normal      Extension Normal      Trendelenburg  Abnormal  Weight shift   SLS/SLS with rotation  B   R>L B <5 sec                 Seated Exam Normal Abnormal N/A Comments   Pelvic Height       Seated Rotation Normal      Seated flexion       B hip IR                     Supine Exam Normal Abnormal N/A Comments   Hip flexion [facility] WFL      Abduction [facility]     ER [facility]  WFL      IR Limited Limited     CATHIE/Da Normal   B   FAIR [place]       FADIR Normal       SLR Normal       Crossed SLR Normal       Prone Exam Normal Abnormal N/A Comments   Prone knee bend       Prone hip IR       B Achilles reflex/Pheasant       PA/Spring       Prone Instability test       Sacral Spring/thrust                       ROM LEFT RIGHT Comments   Hip Flexion 93 97    Hip Abd 34 + hip ER 30    Hip ER 35 18    Hip IR 23 21    Hip Extension              Strength LEFT RIGHT Comments   Multifidus      Transverse Ab   See Below   Hip Flexors 4 4    Hip Abductors 4 4-    Hip Extensors 4 4    Knee Flex 4+ 4+    Knee Ext 4+ 4+                   TA Muscle Contraction Scale    Criteria Score  Quality of Contraction   Not Present [] 0   Rapid, Superificial [] 1   Just Perceptible [x] 2     Gentle, Slow [] 3    Substitution   Resting  [] 0   Moderate to Strong [x] 1    Subtle Perceptible [] 2   None [] 3    Symmetry of Contraction   Unilateral  [] 0   Bilateral/Asymmetrical  [] 1   Symmetrical  [x] 2    Breathing     Inability/Difficulty Breathing during contraction [x] 0   Able to hold contraction while Breathing [] 1    Holding   Able to Hold Contraction <10 s [] 0   Able to Hold Contraction >10 s [x] 1      _6_/10  Adapted from Vlad Neri al, Copyright 2009        Myotomes Normal Abnormal Comments   Hip flexion (L1-L2) Normal     Knee extension (L2-L4) Normal     Dorsiflexion (L4-L5) Normal     Great Toe Ext (L5) Normal     Ankle Eversion (S1-S2) Normal     Ankle PF(S1-S2)  Abnormal Plate L 1732       Dermatomes Normal Abnormal Comments   inguinal area (L1)  X     anterior mid-thigh (L2) X     distal ant thigh/med knee (L3) X     medial lower leg and foot (L4) X     lateral lower leg and foot (L5) X     posterior calf (S1) X     medial calcaneus (S2) X         Neural dynamic tension testing Normal Abnormal Comments   Slump Test  - Degree of knee flexion:       SLR       0-30 Normal      30-70 Normal      Femoral nerve (L2-4)        Joint mobility:    []Normal    []Hypo   []Hyper    Palpation: no TTP     Functional Mobility/Transfers:     Posture:     Gait: (include devices/WB status)         Classification driving today's treatment approach and dosing:  - Prevailing signs and symptoms consistent with:  []Symptom Modulation Approach   [] \"Directional preference subgroup\"    []Flexion Based      []age >50      []imaging evidence of lumbar stenosis     []preference for flexion     []Extension Based     []symptoms distal to buttock     []symptoms centralize with lumbar extension     []symptoms peripheralize with lumbar flexion     []directional preference for extension    []Lateral Shift     []visible frontal plane deviation     []directional preference for lateral translation   [] \"Manipulation subgroup\"  (3/4 meets manipulation criteria)     []symptoms less than 16 days    []FABQ less than 19    []Hypomobility of lumbar spine    []IR of at least 1 hip >35 degrees    Other Factors to consider:    []no symptoms distal to the knee    []no red flags and minimal yellow flags    []no contraindications to manipulation   [] \"Traction subgroup\"      []signs and symptoms of nerve root compression (radiculopathy)     []unable to centralize distal symptoms with movement    []pain or numbness in the lumbar spine, buttock, and/or LE    []peripheralization with extension movements    []+ SLR or + crossed SLR (symptoms less than 70 degrees)  [x]Movement Control Approach   [x]\"Stabilization subgroup\"    []younger age (less than 40)     []greater general flexibility (post-partum, SLR >90)    []abberant movements, painful arc, or Brayan's sign with flex/ext ROM    []positive prone instability test  []Functional Optimization Approach   []\"unspecified subgroup\"    []lower disability scores     []lower fear avoidance scores     []longer duration of symptoms (chronic)    []prior episodes of low back pain    []older age                       [x] Patient history, allergies, meds reviewed. Medical chart reviewed. See intake form. Review Of Systems (ROS):  [x]Performed Review of systems (Integumentary, CardioPulmonary, Neurological) by intake and observation. Intake form has been scanned into medical record. Patient has been instructed to contact their primary care physician regarding ROS issues if not already being addressed at this time.       Co-morbidities/Complexities (which will affect course of rehabilitation):  []None           Arthritic conditions   []Rheumatoid arthritis (M05.9)  []Osteoarthritis (M19.91)   Cardiovascular conditions   []Hypertension (I10)  []Hyperlipidemia (E78.5)  []Angina pectoris (I20)  []Atherosclerosis (I70)   Musculoskeletal conditions   []Disc pathology   []Congenital spine pathologies   []Prior surgical intervention  []Osteoporosis (M81.8)  []Osteopenia (M85.8)   Endocrine conditions   []Hypothyroid (E03.9)  []Hyperthyroid Gastrointestinal conditions   []Constipation (H05.82)   Metabolic conditions   []Morbid obesity (E66.01)  []Diabetes type 1(E10.65) or 2 (E11.65)   []Neuropathy (G60.9)     Pulmonary conditions   []Asthma (J45)  []Coughing   []COPD (J44.9)   Psychological Disorders  []Anxiety (F41.9)  []Depression (F32.9)   []Other:   [x]Other:  R ROSALIE         Barriers to/and or personal factors that will affect rehab potential:              []Age  []Sex              []Motivation/Lack of Motivation                        []Co-Morbidities              []Cognitive Function, education/learning barriers              []Environmental, home barriers              []profession/work barriers  [x]past PT/medical experience  []other:  Justification: Pt has a h/o of R ROSALIE, which may affect rehab potential.     Falls Risk Assessment (30 days):   [x] Falls Risk assessed and no intervention required.   [] Falls Risk assessed and Patient requires intervention due to being higher risk   TUG score (>12s at risk):     [] Falls education provided, including         ASSESSMENT:   Functional Impairments:     []Noted lumbar/proximal hip hypomobility   []Noted lumbosacral and/or generalized hypermobility   []Decreased Lumbosacral/hip/LE functional ROM   [x]Decreased core/proximal hip strength and neuromuscular control    [x]Decreased LE functional strength    []Abnormal reflexes/sensation/myotomal/dermatomal deficits  [x]Reduced balance/proprioceptive control    []other:      Functional Activity Limitations (from functional questionnaire and intake)   [x]Reduced ability to tolerate prolonged functional positions   []Reduced ability or difficulty with changes of positions or transfers between positions   []Reduced ability to maintain good posture and demonstrate good body mechanics with sitting, bending, and lifting   []Reduced ability to sleep   [] Reduced ability or tolerance with driving and/or computer work   [x]Reduced ability to perform lifting, reaching, carrying tasks   [x]Reduced ability to squat   []Reduced ability to forward bend   [x]Reduced ability to ambulate prolonged functional periods/distances/surfaces   [x]Reduced ability to ascend/descend stairs   []other:       Participation Restrictions   []Reduced participation in self care activities   [x]Reduced participation in home management activities   []Reduced participation in work activities   [x]Reduced participation in social activities. []Reduced participation in sport/recreational activities. Classification:   [x]Signs/symptoms consistent with Lumbar instability/stabilization subgroup. []Signs/symptoms consistent with Lumbar mobilization/manipulation subgroup, myotomes and dermatomes intact. Meets manipulation criteria. []Signs/symptoms consistent with Lumbar direction specific/centralization subgroup   []Signs/symptoms consistent with Lumbar traction subgroup     []Signs/symptoms consistent with lumbar facet dysfunction   []Signs/symptoms consistent with lumbar stenosis type dysfunction   []Signs/symptoms consistent with nerve root involvement including myotome & dermatome dysfunction   []Signs/symptoms consistent with post-surgical status including: decreased ROM, strength and function.    []signs/symptoms consistent with pathology which may benefit from Dry needling     []other:      Prognosis/Rehab Potential:      []Excellent   [x]Good    []Fair   []Poor    Tolerance of evaluation/treatment:    [x]Excellent   [x]Good    []Fair   []Poor    Physical Therapy Evaluation Complexity Justification  [x] A history of present problem with:  [] no personal factors and/or comorbidities that impact the plan of care;  [x]1-2 personal factors and/or comorbidities that impact the plan of care  []3 personal factors and/or comorbidities that impact the plan of care  [x] An examination of body systems using standardized tests and measures addressing any of the following: body structures and functions (impairments), activity limitations, and/or participation restrictions;:  [] a total of 1-2 or more elements   [] a total of 3 or more elements   [x] a total of 4 or more elements   [x] A clinical presentation with:  [x] stable and/or uncomplicated characteristics   [] evolving clinical presentation with changing characteristics  [] unstable and unpredictable characteristics;   [x] Clinical decision making of [x] low, [] moderate, [] high complexity using standardized patient assessment instrument and/or measurable assessment of functional outcome. [x] EVAL (LOW) 62251 (typically 20 minutes face-to-face)  [] EVAL (MOD) 51106 (typically 30 minutes face-to-face)  [] EVAL (HIGH) 83427 (typically 45 minutes face-to-face)  [] RE-EVAL     Reviewed insurance benefits for physical therapy in an outpatient hospital based setting with the patient, including deductible and allowable visit number. PLAN: Begin PT focusing on: proximal hip mobilizations, LB mobs, LB core activation, proximal hip activation, and HEP    Frequency/Duration: 1-2 days per week for 4-6 Weeks:  Interventions:  [x]  Therapeutic exercise including: strength training, ROM, for LE, Glutes and core   [x]  NMR activation and proprioception for glutes , LE and Core   [x]  Manual therapy as indicated for Hip complex, LE and spine to include: Dry Needling/IASTM, STM, PROM, Gr I-IV mobilizations, manipulation. [x]  Modalities as needed that may include: thermal agents, E-stim, Biofeedback, US, iontophoresis as indicated  [x]  Patient education on joint protection, postural re-education, activity modification, progression of HEP.     HEP instruction:(see scanned forms) Access Code: [email or web address]    GOALS:  Patient stated goal: alleviate current pain and leg and lower body strengthening     [] Progressing: [] Met: [] Not Met: [] Adjusted    Therapist goals for Patient:   Short Term Goals: To be achieved in: 2 weeks  1. Independent in HEP and progression per patient tolerance, in order to prevent re-injury. [] Progressing: [] Met: [] Not Met: [] Adjusted   2. Patient will have a decrease in pain to facilitate improvement in movement, function, and ADLs as indicated by Functional Deficits. [] Progressing: [] Met: [] Not Met: [] Adjusted    Long Term Goals: To be achieved in: 4-6 weeks  1. Patient will score greater than or equal to 49/100 to assist with reaching prior level of function. [] Progressing: [] Met: [] Not Met: [] Adjusted  2. Patient will demonstrate increased R hip IR and ER AROM to greater than or equal to 30 deg to allow for proper joint functioning as indicated by patients Functional Deficits. [] Progressing: [] Met: [] Not Met: [] Adjusted  3. Patient will demonstrate an increase in R hip (flex, ABD, and ext) strength to 4+/5 to allow for proper functional mobility as indicated by patients Functional Deficits. [] Progressing: [] Met: [] Not Met: [] Adjusted  4. Patient will return to ambulating stairs without increased symptoms or restriction. [] Progressing: [] Met: [] Not Met: [] Adjusted  5. Patient will report returning to independent workout routine without any pain.      [] Progressing: [] Met: [] Not Met: [] Adjusted      Electronically signed by:  Ledy Graff, PT, DPT

## 2022-06-14 ENCOUNTER — HOSPITAL ENCOUNTER (OUTPATIENT)
Dept: PHYSICAL THERAPY | Age: 77
Setting detail: THERAPIES SERIES
Discharge: HOME | End: 2022-06-14
Payer: MEDICARE

## 2022-06-14 PROCEDURE — 97112 NEUROMUSCULAR REEDUCATION: CPT

## 2022-06-14 PROCEDURE — 97110 THERAPEUTIC EXERCISES: CPT

## 2022-06-14 NOTE — FLOWSHEET NOTE
[address] 79, [address] [place], [address]  Phone: [phone]   Fax: [phone]    Physical Therapy Treatment Note/ Progress Report:           Date:  2022    Patient Name:  Deillah Bautista    :  1945  MRN: [id number]  Restrictions/Precautions:    Medical/Treatment Diagnosis Information:  Diagnosis: Lumbar pain - M54.50; Lumbar degenerative disc disease - M51.36, Lumbar spondylolysis - M43.06, Greater trochanteric bursitis of right hip - M70.61; It band syndrome, right - M76.31  Treatment Diagnosis: decreased ROM, strength, balance, and high-level iADLs  Insurance/Certification information:  PT Insurance Information: [facility] [employer] [facility]  Physician Information:  Dr. Nasir Esteban  Has the plan of care been signed (Y/N):        []  Yes  [x]  No     Date of Patient follow up with Physician: 2022      Is this a Progress Report:     []  Yes  [x]  No        Progress report/ Recertification will be due (10 Rx or 30 days whichever is less):2022      Visit # Insurance Allowable Auth Required   2 BMN []  Yes []  No        Functional Scale:   FOTO Lumbar Spine   - 43/100         Latex Allergy:  [x]NO      []YES  Preferred Language for Healthcare:   [x]English       []other:      Pain level: 1-2/10      SUBJECTIVE:  Pt states chief complaint is posterolateral R hip pain. Pt was able to travel and drive to [place] without increased symptoms in low back and hip, however, when exiting car and initiating movement pt had increased pain. Notes improvements with ascending stairs, with increased endurance performing reciprocal navigation, but still requires step to pattern when descending stairs 2/2 lateral hip pain.      OBJECTIVE: See eval    Observation:    Test measurements:      RESTRICTIONS/PRECAUTIONS: none      Exercises/Interventions:   Exercise Resistance/Repetitions Other comments   Stretching:      Hamstring stretch - supine 30 sec x 4 Added 6/1   Piriformis stretch- figure 4 30 sec x 3 R/L  Added 6/14    Knee to opposite shoulder     Supine lower thoracic-lumbar rotation     Sidelying thoracic rotation (open book)     Knee to chest     Standing IT band/side bend stretch Supine ITB with strap   30 sec x3 R/L  Added 6/14    Seated on heels low back stretch     Prone prop on elbow     Prone extn to outstretched hands     Standing lumbar extn     Single knee to chest     Double knee to chest     Seated forward flexion     Cat/camel     Hip flexor stretch half kneeling     Hip flexor stretch supine          Neural mobilization:          Strengthening/stabilization:     Supine SLRs 10 x 3 Added 6/1   Hip ABD isometric 10 sec x 10 Added 6/1   bridge 10 x 3 Added 6/1 6/14-Emphasis on glute squeeze    Glute set iso  10 sec x 10  Added 6/14    Pelvic tilts     TrA contraction     TrA with ball squeeze     TrA contraction with alt marches     TrA with alt 90/90 heel taps     TrA with alt LE extn     Prone alt UE lift     Prone alt UE/LE lift  +   Prone towel squeeze with B leg lift      Prone B hip extn with knees out and feet together     Prone leaning over edge of table hip extn 10x2 R/L  Added 6/14-Bolster on table    Supine B hip abd with TB     clams 10x2 R only  Added 6/14    EOB seated Rev Clam  10x 2 B  Added 6/14-Ball Between knees    Standing clam with back foot against wall     Bridge- DL     Bridge with march      bridge     Bridge on [facility] on [place]     sidelying hip abd     Prone hip extn          Quadruped position with knee on airex with opposite hip lift     Bird/dog     Quadruped with hip abd     Quadruped with hip circles     Quadruped with foot against ball on wall     Opposite UE to LE isometric core     Leg drop hold     Basye System with glute set     Half kneeling on airex glute set + stabilization exercise     Lunge position hold with stabilization exercise     Hip abduction isometric into wall standing     Tandem stance + stabilization exercise     SLS     High march with opposite hip stabilization standing     TrA/mutlifidi walk outs     TrA/multifidi push outs Pallof Press Black Band   10x2 R/L  Added 6/14-Base    Scapular retraction     Seated SB marches     Prone Ts     squats     Leg press     Lateral band walks     SB pikes     SB knee tucks     SB roll outs     planks     Side planks          Manual treatment:          sidelying lumbopelvic roll Prone IASTM Sidelying STM - hypervolt              Therapeutic Exercise and NMR EXR  [x] (08290) Provided verbal/tactile cueing for activities related to strengthening, flexibility, endurance, ROM  for improvements in proximal hip and core control with self care, mobility, lifting and ambulation. [x] (77939) Provided verbal/tactile cueing for activities related to improving balance, coordination, kinesthetic sense, posture, motor skill, proprioception  to assist with core control in self care, mobility, lifting, and ambulation.      Therapeutic Activities:    [x] (55308 or 16142) Provided verbal/tactile cueing for activities related to improving balance, coordination, kinesthetic sense, posture, motor skill, proprioception and motor activation to allow for proper function  with self care and ADLs  [] (34254) Provided training and instruction to the patient for proper core and proximal hip recruitment and positioning with ambulation re-education     Home Exercise Program:    [x] (61321) Reviewed/Progressed HEP activities related to strengthening, flexibility, endurance, ROM of core, proximal hip and LE for functional self-care, mobility, lifting and ambulation   [] (27863) Reviewed/Progressed HEP activities related to improving balance, coordination, kinesthetic sense, posture, motor skill, proprioception of core, proximal hip and LE for self care, mobility, lifting, and ambulation      Manual Treatments:  PROM / STM / Oscillations-Mobs:  G-I, II, III, IV (PA's, Inf., Post.)  [] (68580) Provided manual therapy to mobilize proximal hip and LS spine soft tissue/joints for the purpose of modulating pain, promoting relaxation,  increasing ROM, reducing/eliminating soft tissue swelling/inflammation/restriction, improving soft tissue extensibility and allowing for proper ROM for normal function with self care, mobility, lifting and ambulation. Modalities:     [] GAME READY (VASO)- for significant edema, swelling, pain control. Charges:  Timed Code Treatment Minutes: 40'    Total Treatment Minutes: 43'       [] EVAL (LOW) 83238 (typically 20 minutes face-to-face)  [] EVAL (MOD) 33660 (typically 30 minutes face-to-face)  [] EVAL (HIGH) 10182 (typically 45 minutes face-to-face)  [] RE-EVAL     [x] KW(42129) x 1    [] IONTO  [x] NMR (62946) x  2   [] VASO  [] Manual (27958) x     [] Other:  [] TA x      [] Mech Traction (04059)  [] ES(attended) (66650)      [] ES (un) (65577):         ASSESSMENT:  See eval 6/1    Goals:  Patient stated goal: alleviate current pain and leg and lower body strengthening     [] Progressing: [] Met: [] Not Met: [] Adjusted    Therapist goals for Patient:   Short Term Goals: To be achieved in: 2 weeks  1. Independent in HEP and progression per patient tolerance, in order to prevent re-injury. [] Progressing: [] Met: [] Not Met: [] Adjusted   2. Patient will have a decrease in pain to facilitate improvement in movement, function, and ADLs as indicated by Functional Deficits. [] Progressing: [] Met: [] Not Met: [] Adjusted    Long Term Goals: To be achieved in: 4-6 weeks  1. Patient will score greater than or equal to 49/100 to assist with reaching prior level of function. [] Progressing: [] Met: [] Not Met: [] Adjusted  2. Patient will demonstrate increased R hip IR and ER AROM to greater than or equal to 30 deg to allow for proper joint functioning as indicated by patients Functional Deficits.     [] Progressing: [] Met: [] Not Met: [] Adjusted  3. Patient will demonstrate an increase in R hip (flex, ABD, and ext) strength to 4+/5 to allow for proper functional mobility as indicated by patients Functional Deficits. [] Progressing: [] Met: [] Not Met: [] Adjusted  4. Patient will return to ambulating stairs without increased symptoms or restriction. [] Progressing: [] Met: [] Not Met: [] Adjusted  5. Patient will report returning to independent workout routine without any pain. [] Progressing: [] Met: [] Not Met: [] Adjusted     Overall Progression Towards Functional goals/ Treatment Progress Update:  [] Patient is progressing as expected towards functional goals listed. [] Progression is slowed due to complexities/Impairments listed. [] Progression has been slowed due to co-morbidities. [x] Plan just implemented, too soon to assess goals progression <30days   [] Goals require adjustment due to lack of progress  [] Patient is not progressing as expected and requires additional follow up with physician  [] Other    Prognosis for POC: [x] Good [] Fair  [] Poor      Patient requires continued skilled intervention: [x] Yes  [] No      Treatment/Activity Tolerance:  [x] Patient tolerated treatment well [] Patient limited by fatique  [] Patient limited by pain  [] Patient limited by other medical complications  [] Other: Pt tolerated increased exercise Rx this date without increase in low back or posterolateral hip pain. Cueing and education was provided to improve LE postures and achieving appropriate glute facilitation through strengthening. Pt displays B LE ER compensation during SLR and verbal cueing provided to improve LE alignment. At end of session, pt advised to assess tolerance to exercise Rx over the next couple of days and tolerance/presentation will be reviewed at [facility] to determine continued POC. Patient education: Patient education on PT and plan of care including diagnosis, prognosis, treatment goals and options.  Patient agrees with discussed POC and treatment and is aware of rehab process. 6/1      PLAN: See eval 6/1  [x] Continue per plan of care [] Alter current plan (see comments above)  [] Plan of care initiated [] Hold pending MD visit [] Discharge      Electronically signed by:  JOSEPH Stout,[id number]    Note: If patient does not return for scheduled/ recommended follow up visits, this note will serve as a discharge from care along with most recent update on progress.

## 2022-06-16 ENCOUNTER — HOSPITAL ENCOUNTER (OUTPATIENT)
Dept: PHYSICAL THERAPY | Age: 77
Setting detail: THERAPIES SERIES
End: 2022-06-16
Payer: MEDICARE

## 2022-06-17 ENCOUNTER — HOSPITAL ENCOUNTER (OUTPATIENT)
Dept: PHYSICAL THERAPY | Age: 77
Setting detail: THERAPIES SERIES
Discharge: HOME | End: 2022-06-17
Payer: MEDICARE

## 2022-06-17 NOTE — FLOWSHEET NOTE
[facility]     Physical Therapy  Cancellation/No-show Note  Patient Name:  Bassam Joens  :  1945   Date:  2022  Cancelled visits to date: 1  No-shows to date: 0    For today's appointment patient:  [x]  Cancelled  []  Rescheduled appointment  []  No-show     Reason given by patient:  []  Patient ill  []  Conflicting appointment  [x]  No transportation    []  Conflict with work  []  No reason given  []  Other:     Comments:      Electronically signed by:  AIME Goldstein   Therapist was present, directed the patient's care, made skilled judgement, and was responsible for assessment and treatment of the patient. Patient was in agreement to be treated by student physical therapist with licensed physical therapist present.        Rossy Torres, PT, PT

## 2022-06-20 ENCOUNTER — HOSPITAL ENCOUNTER (OUTPATIENT)
Dept: PHYSICAL THERAPY | Age: 77
Setting detail: THERAPIES SERIES
Discharge: HOME | End: 2022-06-20
Payer: MEDICARE

## 2022-06-20 PROCEDURE — 97112 NEUROMUSCULAR REEDUCATION: CPT | Performed by: PHYSICAL THERAPIST

## 2022-06-20 PROCEDURE — 97110 THERAPEUTIC EXERCISES: CPT | Performed by: PHYSICAL THERAPIST

## 2022-06-20 PROCEDURE — 97530 THERAPEUTIC ACTIVITIES: CPT | Performed by: PHYSICAL THERAPIST

## 2022-06-20 NOTE — FLOWSHEET NOTE
[facility] 79, [address] [place], [address]  Phone: [phone]   Fax: [phone]    Physical Therapy Treatment Note/ Progress Report:       Date:  2022    Patient Name:  Hafsa Payne    :  1945  MRN: [id number]  Restrictions/Precautions:    Medical/Treatment Diagnosis Information:  Diagnosis: Lumbar pain - M54.50; Lumbar degenerative disc disease - M51.36, Lumbar spondylolysis - M43.06, Greater trochanteric bursitis of right hip - M70.61; It band syndrome, right - M76.31  Treatment Diagnosis: decreased ROM, strength, balance, and high-level iADLs  Insurance/Certification information:  PT Insurance Information: [facility]  Physician Information:  Dr. Kristi Mccann  Has the plan of care been signed (Y/N):        []  Yes  [x]  No     Date of Patient follow up with Physician: 2022      Is this a Progress Report:     []  Yes  [x]  No        Progress report/ Recertification will be due (10 Rx or 30 days whichever is less):2022      Visit # Insurance Allowable Auth Required   3 BMN []  Yes []  No        Functional Scale:   FOTO Lumbar Spine   - 43/100         Latex Allergy:  [x]NO      []YES  Preferred Language for Healthcare:   [x]English       []other:      Pain level: 2/10 6/20     SUBJECTIVE:  Pt states he needs to get back on his daily exercises. He states he was good about them on vacation, but not since being back. He states he was feeling better while on vacation and doing his HEP daily. He states he has pain in the morning, that recedes as the day goes on. He is taking medication.        OBJECTIVE: See eval    Observation:    Test measurements:      RESTRICTIONS/PRECAUTIONS: none      Exercises/Interventions:   Exercise Resistance/Repetitions Other comments   Bike 5'  Added    Stretching:      Hamstring stretch - supine 30 sec x 3 R/L Added    Piriformis stretch- figure 4 30 sec x 3 R/L  Added  Knee to opposite shoulder     Supine lower thoracic-lumbar rotation     Sidelying thoracic rotation (open book)     Knee to chest     Standing IT band/side bend stretch Supine ITB with strap   30 sec x3 R/L  Added 6/14    Seated on heels low back stretch     Prone prop on elbow     Prone extn to outstretched hands     Standing lumbar extn     Single knee to chest     Double knee to chest     Seated forward flexion     Cat/camel     Hip flexor stretch half kneeling     Hip flexor stretch supine          Neural mobilization:          Strengthening/stabilization:     Supine SLRs 10 x 3 2# ^6/20   Hip ABD isometric 10 x 2 ^6/20   bridge 10 x 3 Added 6/1 6/14-Emphasis on glute squeeze    Glute set iso  Pelvic tilts     TrA contraction     TrA with ball squeeze     TrA contraction with alt marches     TrA with alt 90/90 heel taps     TrA with alt LE extn     Prone alt UE lift     Prone alt UE/LE lift  +   Prone towel squeeze with B leg lift      Prone B hip extn with knees out and feet together     Prone leaning over edge of table hip extn 10x2 R/L  Added 6/14-Bolster on table    Supine B hip abd with TB     clams 10x2 R only  Added 6/14    EOB seated Rev Clam  Standing clam with back foot against wall     Bridge- DL     Bridge with march     SL bridge     Bridge on [facility] on [place]     sidelying hip abd     Prone hip extn          Quadruped position with knee on airex with opposite hip lift     Bird/dog     Quadruped with hip abd     Quadruped with hip circles     Quadruped with foot against ball on wall     Opposite UE to LE isometric core     Leg drop hold     Saman System with glute set     Half kneeling on airex glute set + stabilization exercise     Lunge position hold with stabilization exercise     Hip abduction isometric into wall standing     Tandem stance + stabilization exercise     SLS     High march with opposite hip stabilization standing     TrA/mutlifidi walk outs     TrA/multifidi push outs Pallof Press Black Band   10x2 R/L  Added 6/14-Base    Scapular retraction     Seated SB marches     Prone Ts     squats     Leg press     Lateral band walks     SB pikes     SB knee tucks     SB roll outs     planks     Side planks          Manual treatment:          sidelying lumbopelvic roll Prone IASTM Sidelying STM - hypervolt              Therapeutic Exercise and NMR EXR  [x] (70571) Provided verbal/tactile cueing for activities related to strengthening, flexibility, endurance, ROM  for improvements in proximal hip and core control with self care, mobility, lifting and ambulation. [x] (92371) Provided verbal/tactile cueing for activities related to improving balance, coordination, kinesthetic sense, posture, motor skill, proprioception  to assist with core control in self care, mobility, lifting, and ambulation.      Therapeutic Activities:    [x] (60508 or 38081) Provided verbal/tactile cueing for activities related to improving balance, coordination, kinesthetic sense, posture, motor skill, proprioception and motor activation to allow for proper function  with self care and ADLs  [] (17381) Provided training and instruction to the patient for proper core and proximal hip recruitment and positioning with ambulation re-education     Home Exercise Program:    [x] (70843) Reviewed/Progressed HEP activities related to strengthening, flexibility, endurance, ROM of core, proximal hip and LE for functional self-care, mobility, lifting and ambulation   [] (52159) Reviewed/Progressed HEP activities related to improving balance, coordination, kinesthetic sense, posture, motor skill, proprioception of core, proximal hip and LE for self care, mobility, lifting, and ambulation      Access Code: [email or web address]    Manual Treatments:  PROM / STM / Oscillations-Mobs:  G-I, II, III, IV (PA's, Inf., Post.)  [] (24328) Provided manual therapy to mobilize proximal hip and LS spine soft tissue/joints for the purpose of modulating pain, promoting relaxation,  increasing ROM, reducing/eliminating soft tissue swelling/inflammation/restriction, improving soft tissue extensibility and allowing for proper ROM for normal function with self care, mobility, lifting and ambulation. Modalities:  Declined 6/20   [] GAME READY (VASO)- for significant edema, swelling, pain control. Charges:  Timed Code Treatment Minutes: 48'    Total Treatment Minutes: 61'       [] EVAL (LOW) 34371 (typically 20 minutes face-to-face)  [] EVAL (MOD) 09961 (typically 30 minutes face-to-face)  [] EVAL (HIGH) 02613 (typically 45 minutes face-to-face)  [] RE-EVAL     [x] QF(20453) x 1    [] IONTO  [x] NMR (79574) x 1   [] VASO  [] Manual (81862) x     [] Other:  [x] TA x 1      [] Mech Traction (59989)  [] ES(attended) (69630)      [] ES (un) (74381):         ASSESSMENT:    Goals:  Patient stated goal: alleviate current pain and leg and lower body strengthening     [] Progressing: [] Met: [] Not Met: [] Adjusted    Therapist goals for Patient:   Short Term Goals: To be achieved in: 2 weeks  1. Independent in HEP and progression per patient tolerance, in order to prevent re-injury. [] Progressing: [] Met: [] Not Met: [] Adjusted   2. Patient will have a decrease in pain to facilitate improvement in movement, function, and ADLs as indicated by Functional Deficits. [] Progressing: [] Met: [] Not Met: [] Adjusted    Long Term Goals: To be achieved in: 4-6 weeks  1. Patient will score greater than or equal to 49/100 to assist with reaching prior level of function. [] Progressing: [] Met: [] Not Met: [] Adjusted  2. Patient will demonstrate increased R hip IR and ER AROM to greater than or equal to 30 deg to allow for proper joint functioning as indicated by patients Functional Deficits. [] Progressing: [] Met: [] Not Met: [] Adjusted  3.  Patient will demonstrate an increase in R hip (flex, ABD, and ext) strength to 4+/5 to allow for proper functional mobility as indicated by patients Functional Deficits. [] Progressing: [] Met: [] Not Met: [] Adjusted  4. Patient will return to ambulating stairs without increased symptoms or restriction. [] Progressing: [] Met: [] Not Met: [] Adjusted  5. Patient will report returning to independent workout routine without any pain. [] Progressing: [] Met: [] Not Met: [] Adjusted     Overall Progression Towards Functional goals/ Treatment Progress Update:  [] Patient is progressing as expected towards functional goals listed. [] Progression is slowed due to complexities/Impairments listed. [] Progression has been slowed due to co-morbidities. [x] Plan just implemented, too soon to assess goals progression <30days   [] Goals require adjustment due to lack of progress  [] Patient is not progressing as expected and requires additional follow up with physician  [] Other    Prognosis for POC: [x] Good [] Fair  [] Poor      Patient requires continued skilled intervention: [x] Yes  [] No      Treatment/Activity Tolerance:  [x] Patient tolerated treatment well [] Patient limited by fatique  [] Patient limited by pain  [] Patient limited by other medical complications  [] Other: Pt job session well. He job the addition of the bike. He job the progression of SLRs without any pain. He required min VCs to improve form with anti-rotation pallof press. HEP was updated. 6/20    Patient education: Patient education on PT and plan of care including diagnosis, prognosis, treatment goals and options. Patient agrees with discussed POC and treatment and is aware of rehab process.  6/1      PLAN: See eval 6/1  [x] Continue per plan of care [] Alter current plan (see comments above)  [] Plan of care initiated [] Hold pending MD visit [] Discharge      Electronically signed by:  Adrian Pitts, PT, DPT     Note: If patient does not return for scheduled/ recommended follow up visits, this note will serve as a discharge from care along with most recent update on progress.

## 2022-06-23 ENCOUNTER — APPOINTMENT (OUTPATIENT)
Dept: PHYSICAL THERAPY | Age: 77
End: 2022-06-23
Payer: MEDICARE

## 2022-06-23 ENCOUNTER — HOSPITAL ENCOUNTER (OUTPATIENT)
Dept: PHYSICAL THERAPY | Age: 77
Setting detail: THERAPIES SERIES
Discharge: HOME | End: 2022-06-23
Payer: MEDICARE

## 2022-06-23 PROCEDURE — 97110 THERAPEUTIC EXERCISES: CPT | Performed by: PHYSICAL THERAPIST

## 2022-06-23 PROCEDURE — 97112 NEUROMUSCULAR REEDUCATION: CPT | Performed by: PHYSICAL THERAPIST

## 2022-06-23 PROCEDURE — 97530 THERAPEUTIC ACTIVITIES: CPT | Performed by: PHYSICAL THERAPIST

## 2022-06-23 NOTE — FLOWSHEET NOTE
[facility] [address], [address] [place], [address]  Phone: [phone]   Fax: [phone]    Physical Therapy Treatment Note/ Progress Report:       Date:  2022    Patient Name:  Yanet Garcia    :  1945  MRN: [id number]  Restrictions/Precautions:    Medical/Treatment Diagnosis Information:  Diagnosis: Lumbar pain - M54.50; Lumbar degenerative disc disease - M51.36, Lumbar spondylolysis - M43.06, Greater trochanteric bursitis of right hip - M70.61; It band syndrome, right - M76.31  Treatment Diagnosis: decreased ROM, strength, balance, and high-level iADLs  Insurance/Certification information:  PT [facility] Information[facility]  Physician Information:  Dr. Tomás Comer  Has the plan of care been signed (Y/N):        []  Yes  [x]  No     Date of Patient follow up with Physician: 2022      Is this a Progress Report:     []  Yes  [x]  No        Progress report/ Recertification will be due (10 Rx or 30 days whichever is less):2022      Visit # Insurance Allowable Auth Required   4 BMN []  Yes []  No        Functional Scale:   FOTO Lumbar Spine   - 43/100         Latex Allergy:  [x]NO      []YES  Preferred Language for Healthcare:   [x]English       []other:      Pain level: 0 /10      SUBJECTIVE:  Pt states he was sore after HEP yesterday, but feeling good this morning. He states no pain. He states after last session, he felt good without any soreness.         OBJECTIVE: See eval    Observation:    Test measurements:      RESTRICTIONS/PRECAUTIONS: none      Exercises/Interventions:   Exercise Resistance/Repetitions Other comments   Bike 5'  Added    Stretching:      Hamstring stretch - supine 30 sec x 3 R/L Added    Piriformis stretch- figure 4 30 sec x 3 R/ Added     Knee to opposite shoulder     Supine lower thoracic-lumbar rotation     Sidelying thoracic rotation (open book)     Knee to chest Standing IT band/side bend stretch Supine ITB with strap   30 sec x3 R/L  Added 6/14    Seated on heels low back stretch     Prone prop on elbow     Prone extn to outstretched hands     Standing lumbar extn     Single knee to chest     Double knee to chest     Seated forward flexion     Cat/camel     Hip flexor stretch half kneeling     Hip flexor stretch supine          Neural mobilization:          Strengthening/stabilization:     Supine SLRs 10 x 3 2# ^6/20   Hip ABD isometric 10 x 2 ^6/20   bridge 10 x 3 Added 6/1 6/14-Emphasis on glute squeeze    Glute set iso  Pelvic tilts     TrA contraction     TrA with ball squeeze     TrA contraction with alt marches     TrA with alt 90/90 heel taps     TrA with alt LE extn     Prone alt UE lift     Prone alt UE/LE lift  +   Prone towel squeeze with B leg lift      Prone B hip extn with knees out and feet together     Prone leaning over edge of table hip extn 10x2 R/L  Added 6/14-Bolster on table    Supine B hip abd with TB     clams 10x2 R only  Added 6/14    EOB seated Rev Clam  Standing clam with back foot against wall     Bridge- DL     Bridge with march     SL bridge     Bridge on [facility] [place]     sidelying hip abd     Prone hip extn          Quadruped position with knee on airex with opposite hip lift     Bird/dog     Quadruped with hip abd     Quadruped with hip circles     Quadruped with foot against ball on wall     Opposite UE to LE isometric core     Leg drop hold     Saman System with glute set     Half kneeling on airex glute set + stabilization exercise     Lunge position hold with stabilization exercise     Hip abduction isometric into wall standing     Tandem stance + stabilization exercise     SLS     High march with opposite hip stabilization standing     TrA/mutlifidi walk outs     TrA/multifidi push outs Pallof Press Black Band   10x3 R/L  Added 6/14-Base    Scapular retraction     Seated SB marches     Prone Ts     squats     Leg press 10 x 3 swelling/inflammation/restriction, improving soft tissue extensibility and allowing for proper ROM for normal function with self care, mobility, lifting and ambulation. Modalities:  Declined 6/23   [] GAME READY (VASO)- for significant edema, swelling, pain control. Charges:  Timed Code Treatment Minutes: 39'    Total Treatment Minutes: 54'      [] EVAL (LOW) 28026 (typically 20 minutes face-to-face)  [] EVAL (MOD) 84967 (typically 30 minutes face-to-face)  [] EVAL (HIGH) 35067 (typically 45 minutes face-to-face)  [] RE-EVAL     [x] HM(80936) x 1    [] IONTO  [x] NMR (76209) x 1   [] VASO  [] Manual (28287) x     [] Other:  [x] TA x 1      [] Mech Traction (94223)  [] ES(attended) (19405)      [] ES (un) (96096):         ASSESSMENT:    Goals:  Patient stated goal: alleviate current pain and leg and lower body strengthening     [] Progressing: [] Met: [] Not Met: [] Adjusted    Therapist goals for Patient:   Short Term Goals: To be achieved in: 2 weeks  1. Independent in HEP and progression per patient tolerance, in order to prevent re-injury. [] Progressing: [] Met: [] Not Met: [] Adjusted   2. Patient will have a decrease in pain to facilitate improvement in movement, function, and ADLs as indicated by Functional Deficits. [] Progressing: [] Met: [] Not Met: [] Adjusted    Long Term Goals: To be achieved in: 4-6 weeks  1. Patient will score greater than or equal to 49/100 to assist with reaching prior level of function. [] Progressing: [] Met: [] Not Met: [] Adjusted  2. Patient will demonstrate increased R hip IR and ER AROM to greater than or equal to 30 deg to allow for proper joint functioning as indicated by patients Functional Deficits. [] Progressing: [] Met: [] Not Met: [] Adjusted  3. Patient will demonstrate an increase in R hip (flex, ABD, and ext) strength to 4+/5 to allow for proper functional mobility as indicated by patients Functional Deficits.    [] Progressing: [] Met: [] Not Met: [] Adjusted  4. Patient will return to ambulating stairs without increased symptoms or restriction. [] Progressing: [] Met: [] Not Met: [] Adjusted  5. Patient will report returning to independent workout routine without any pain. [] Progressing: [] Met: [] Not Met: [] Adjusted     Overall Progression Towards Functional goals/ Treatment Progress Update:  [] Patient is progressing as expected towards functional goals listed. [] Progression is slowed due to complexities/Impairments listed. [] Progression has been slowed due to co-morbidities. [x] Plan just implemented, too soon to assess goals progression <30days   [] Goals require adjustment due to lack of progress  [] Patient is not progressing as expected and requires additional follow up with physician  [] Other    Prognosis for POC: [x] Good [] Fair  [] Poor      Patient requires continued skilled intervention: [x] Yes  [] No      Treatment/Activity Tolerance:  [x] Patient tolerated treatment well [] Patient limited by fatique  [] Patient limited by pain  [] Patient limited by other medical complications  [] Other: Pt job session well. He reported pain with attempted progression of sidelying SLRs. He did job the addition of leg press and hamstring curls. He denied feeling a stretch with figure 4 stretch on LLE, so only continued on the RLE. Will re-assess next session. 6/23    Patient education: Patient education on PT and plan of care including diagnosis, prognosis, treatment goals and options. Patient agrees with discussed POC and treatment and is aware of rehab process.  6/1      PLAN: See eval 6/1  [x] Continue per plan of care [] Alter current plan (see comments above)  [] Plan of care initiated [] Hold pending MD visit [] Discharge      Electronically signed by:  Nina Clements, PT, DPT     Note: If patient does not return for scheduled/ recommended follow up visits, this note will serve as a discharge from care along with most recent update on progress.

## 2022-06-27 ENCOUNTER — HOSPITAL ENCOUNTER (OUTPATIENT)
Dept: PHYSICAL THERAPY | Age: 77
Setting detail: THERAPIES SERIES
Discharge: HOME | End: 2022-06-27
Payer: MEDICARE

## 2022-06-27 PROCEDURE — 97110 THERAPEUTIC EXERCISES: CPT | Performed by: PHYSICAL THERAPIST

## 2022-06-27 PROCEDURE — 97112 NEUROMUSCULAR REEDUCATION: CPT | Performed by: PHYSICAL THERAPIST

## 2022-06-27 PROCEDURE — 97530 THERAPEUTIC ACTIVITIES: CPT | Performed by: PHYSICAL THERAPIST

## 2022-06-27 NOTE — PLAN OF CARE
Information:  Diagnosis: Lumbar pain - M54.50; Lumbar degenerative disc disease - M51.36, Lumbar spondylolysis - M43.06, Greater trochanteric bursitis of right hip - M70.61; It band syndrome, right - M76.31  Treatment Diagnosis: decreased ROM, strength, balance, and high-level iADLs  Insurance/Certification information:  PT Insurance Information: [facility]  Physician Information:  Dr. Vitaly Richard  Has the plan of care been signed (Y/N):        []  Yes  [x]  No     Date of Patient follow up with Physician: 28 June 2022      Is this a Progress Report:     [x]  Yes  []  No        Progress report/ Recertification will be due (10 Rx or 30 days whichever is less): 25 July 2022      Visit # Insurance Allowable Auth Required   5 BMN []  Yes []  No        Functional Scale:   FOTO Lumbar Spine  6/1 - 43/100 6/27 - 57/100         Latex Allergy:  [x]NO      []YES  Preferred Language for Healthcare:   [x]English       []other:      Pain level: 1  /10 6/27    SUBJECTIVE:  Pt states he was sore after last session after leg press. He has felt good since last session.     6/27    OBJECTIVE: 6/27   Observation:    Test measurements:         Standing Exam Normal Abnormal N/A Comments   Toe walk             Heel Walk           Side bending Normal         Pelvic Height           Fwd Bend- (aberrant juttering or innominate mvmt) Normal         Extension Normal         Trendelenburg   Abnormal   Weight shift   SLS/SLS with rotation   B    R>L B <5 sec                           Seated Exam Normal Abnormal N/A Comments   Pelvic Height           Seated Rotation Normal         Seated flexion           B hip IR                                   Supine Exam Normal Abnormal N/A Comments   [facility]ip flexion [facility]        Abduction [facility]  WFL       ER [facility]  WF        IR Limited Limited       CATHIE/Da Normal     B   FA[place]          FADIR Normal          SLR Normal          Crossed SLR Normal          Prone Exam Normal Abnormal N/A Comments   Prone knee bend           Prone hip IR           B Achilles reflex/Pheasant           PA/Spring           Prone Instability test           Sacral Spring/thrust                                      ROM LEFT RIGHT Comments   Hip Flexion 95 98     Hip Abd 43 + hip ER 39     Hip ER 35 18     Hip IR 23 21     Hip Extension                      Strength LEFT RIGHT Comments   Multifidus         Transverse Ab     See Below   Hip Flexors 4+ 4+     Hip Abductors 4 4-     Hip Extensors 4 4     Knee Flex 4+ 4+     Knee Ext 4+ 4+                            TA Muscle Contraction Scale     Criteria                                                                                  Score  Quality of Contraction                                                                                                 Not Present     []? 0                                                                                                 Rapid, Superificial       []? 1                                                                                                 Just Perceptible          [x]? 2                                                                                                                   Gentle, Slow    []? 3     Substitution                                                                                                 Resting            []? 0                                                                                                 Moderate to Strong     []? 1                                                                                                  Subtle Perceptible       [x]? 2                                                                                                 None    []?  3     Symmetry of Contraction                                                                                                 Unilateral         []? 0 Bilateral/Asymmetrical            []? 1                                                                                                 Symmetrical    [x]? 2     Breathing                                                                                                 Inability/Difficulty Breathing during contraction                                                           [x]? 0                                                                                                 Able to hold contraction while Breathing                                                                        [x]? 1     Holding                                                                                                 Able to Hold Contraction <10 s                                                                                      []? 0                                                                                                 Able to Hold Contraction >10 s                                                                                      [x]?  1                                                                                                                _8_/10  Adapted from [employer], Copyright 2009       Joint mobility:               []?Normal                      []?Hypo              []?Hyper     Palpation: no TTP      Functional Mobility/Transfers:      Posture:      Gait: (include devices/WB status)       RESTRICTIONS/PRECAUTIONS: none      Exercises/Interventions:   Exercise Resistance/Repetitions Other comments   Bike 5'  Added 6/20   Stretching:      Hamstring stretch - supine 30 sec x 3 R/L Added 6/1   Piriformis stretch- figure 4 30 sec x 3 R/ Added 6/14    Knee to opposite shoulder     Supine lower thoracic-lumbar rotation     Sidelying thoracic rotation (open book)     Knee to chest     Standing IT band/side bend stretch Supine ITB with strap   30 sec x3 R/L  Added 6/14    Seated on heels low back stretch Prone prop on elbow     Prone extn to outstretched hands     Standing lumbar extn     Single knee to chest     Double knee to chest     Seated forward flexion     Cat/camel     Hip flexor stretch half kneeling     Hip flexor stretch supine          Neural mobilization:          Strengthening/stabilization:     Supine SLRs 10 x 3 2# ^6/20   Hip ABD isometric 10 x 2 ^6/20   bridge 10 x 3 Added 6/1 6/14-Emphasis on glute squeeze    Glute set iso  Pelvic tilts     TrA contraction     TrA with ball squeeze     TrA contraction with alt marches     TrA with alt 90/90 heel taps     TrA with alt LE extn     Prone alt UE lift     Prone alt UE/LE lift 10 x 3  Added 6/27   Prone towel squeeze with B leg lift      Prone B hip extn with knees out and feet together     Prone leaning over edge of table hip extn 10x2 R/L  Added 6/14-Bolster on table    Supine B hip abd with TB     clams 10x2 R only  Added 6/14    EOB seated Rev Clam  Standing clam with back foot against wall     Bridge- DL     Bridge with march     SL bridge     Bridge on [facility]     sidelying hip abd     Prone hip extn          Quadruped position with knee on airex with opposite hip lift     Bird/dog     Quadruped with hip abd     Quadruped with hip circles     Quadruped with foot against ball on wall     Opposite UE to LE isometric core     Leg drop hold     Saman System with glute set     Half kneeling on airex glute set + stabilization exercise     Lunge position hold with stabilization exercise     Hip abduction isometric into wall standing     Tandem stance + stabilization exercise     SLS     High march with opposite hip stabilization standing     TrA/mutlifidi walk outs     TrA/multifidi alternating push outs/ rotation Pallof Press Black Band   10x3 R/L Added 6/14-Base    Scapular retraction    Seated SB marches     Prone Ts     squats     Leg press Added 6/23   Hamstring curls Added 6/23   Lateral band walks     SB pikes     SB knee tucks SB roll outs     planks     Side planks          Manual treatment:          sidelying lumbopelvic roll Prone IASTM Sidelying STM - hypervolt              Therapeutic Exercise and NMR EXR  [x] (79965) Provided verbal/tactile cueing for activities related to strengthening, flexibility, endurance, ROM  for improvements in proximal hip and core control with self care, mobility, lifting and ambulation. [x] (69349) Provided verbal/tactile cueing for activities related to improving balance, coordination, kinesthetic sense, posture, motor skill, proprioception  to assist with core control in self care, mobility, lifting, and ambulation.      Therapeutic Activities:    [x] (30291 or 76844) Provided verbal/tactile cueing for activities related to improving balance, coordination, kinesthetic sense, posture, motor skill, proprioception and motor activation to allow for proper function  with self care and ADLs  [] (35530) Provided training and instruction to the patient for proper core and proximal hip recruitment and positioning with ambulation re-education     Home Exercise Program:    [x] (53636) Reviewed/Progressed HEP activities related to strengthening, flexibility, endurance, ROM of core, proximal hip and LE for functional self-care, mobility, lifting and ambulation   [] (05383) Reviewed/Progressed HEP activities related to improving balance, coordination, kinesthetic sense, posture, motor skill, proprioception of core, proximal hip and LE for self care, mobility, lifting, and ambulation      Access Code: [email or web address]    Manual Treatments:  PROM / STM / Oscillations-Mobs:  G-I, II, III, IV (PA's, Inf., Post.)  [] (18896) Provided manual therapy to mobilize proximal hip and LS spine soft tissue/joints for the purpose of modulating pain, promoting relaxation,  increasing ROM, reducing/eliminating soft tissue swelling/inflammation/restriction, improving soft tissue extensibility and allowing for proper ROM for normal function with self care, mobility, lifting and ambulation. Modalities:  Declined 6/23   [] GAME READY (VASO)- for significant edema, swelling, pain control. Charges:  Timed Code Treatment Minutes: 48'    Total Treatment Minutes: 79'       [] EVAL (LOW) 455 1011 (typically 20 minutes face-to-face)  [] EVAL (MOD) 69466 (typically 30 minutes face-to-face)  [] EVAL (HIGH) 61979 (typically 45 minutes face-to-face)  [] RE-EVAL     [x] JA(48447) x 1    [] IONTO  [x] NMR (65562) x 1   [] VASO  [] Manual (40163) x     [] Other:  [x] TA x 1      [] Mech Traction (06877)  [] ES(attended) (49634)      [] ES (un) (91640):         ASSESSMENT:    Goals: 6/27  Patient stated goal: alleviate current pain and leg and lower body strengthening     [x] Progressing: [] Met: [] Not Met: [] Adjusted    Therapist goals for Patient:   Short Term Goals: To be achieved in: 2 weeks  1. Independent in HEP and progression per patient tolerance, in order to prevent re-injury. [] Progressing: [x] Met: [] Not Met: [] Adjusted   2. Patient will have a decrease in pain to facilitate improvement in movement, function, and ADLs as indicated by Functional Deficits. [] Progressing: [x] Met: [] Not Met: [] Adjusted    Long Term Goals: To be achieved in: 4-6 weeks  1. Patient will score greater than or equal to 49/100 to assist with reaching prior level of function. [] Progressing: [x] Met: [] Not Met: [] Adjusted  2. Patient will demonstrate increased R hip IR and ER AROM to greater than or equal to 30 deg to allow for proper joint functioning as indicated by patients Functional Deficits. [x] Progressing: [] Met: [] Not Met: [] Adjusted  3. Patient will demonstrate an increase in R hip (flex, ABD, and ext) strength to 4+/5 to allow for proper functional mobility as indicated by patients Functional Deficits. [x] Progressing: [] Met: [] Not Met: [] Adjusted  4. Patient will return to ambulating stairs without increased symptoms or restriction.    [] Progressing: [x] Met: [] Not Met: [] Adjusted  5. Patient will report returning to independent workout routine without any pain. [x] Progressing: [] Met: [] Not Met: [] Adjusted     Overall Progression Towards Functional goals/ Treatment Progress Update:  [x] Patient is progressing as expected towards functional goals listed. [] Progression is slowed due to complexities/Impairments listed. [] Progression has been slowed due to co-morbidities. [] Plan just implemented, too soon to assess goals progression <30days   [] Goals require adjustment due to lack of progress  [] Patient is not progressing as expected and requires additional follow up with physician  [] Other    Prognosis for POC: [x] Good [] Fair  [] Poor      Patient requires continued skilled intervention: [x] Yes  [] No      Treatment/Activity Tolerance:  [x] Patient tolerated treatment well [] Patient limited by fatique  [] Patient limited by pain  [] Patient limited by other medical complications  [] Other: Pt presents to PT with improved low back symptoms. He demonstrates improved hip ROM. He demonstrates improved hip flex strength, but continues to lack hip ABD and ext strength. He is progressing towards meeting his goals. He was fatigued today with the addition of prone alternating UE/LE lifts. Patient has follow-up appt with MD tomorrow. Recommend pt continue with 1x/ week to progress glute and core strengthening to return to PLOF.     6/27    Patient education: Patient education on PT and plan of care including diagnosis, prognosis, treatment goals and options. Patient agrees with discussed POC and treatment and is aware of rehab process.  6/1      PLAN: 1x/ week for 4 weeks (6/27/22 - 7/25/22)   [x] Continue per plan of care [] Alter current plan (see comments above)  [] Plan of care initiated [] Hold pending MD visit [] Discharge      Electronically signed by:  Carlie Stearns, PT, DPT     Note: If patient does not return for scheduled/ recommended follow up visits, this note will serve as a discharge from care along with most recent update on progress.

## 2022-06-28 ENCOUNTER — OFFICE VISIT (OUTPATIENT)
Dept: ORTHOPEDIC SURGERY | Age: 77
End: 2022-06-28
Payer: MEDICARE

## 2022-06-28 VITALS — WEIGHT: 215 LBS | BODY MASS INDEX: 33.74 KG/M2 | HEIGHT: 67 IN

## 2022-06-28 DIAGNOSIS — M51.36 LUMBAR DEGENERATIVE DISC DISEASE: Primary | ICD-10-CM

## 2022-06-28 DIAGNOSIS — M76.31 IT BAND SYNDROME, RIGHT: ICD-10-CM

## 2022-06-28 DIAGNOSIS — M43.06 LUMBAR SPONDYLOLYSIS: ICD-10-CM

## 2022-06-28 DIAGNOSIS — M54.50 LUMBAR PAIN: ICD-10-CM

## 2022-06-28 DIAGNOSIS — M70.61 GREATER TROCHANTERIC BURSITIS OF RIGHT HIP: ICD-10-CM

## 2022-06-28 PROCEDURE — 1123F ACP DISCUSS/DSCN MKR DOCD: CPT | Performed by: FAMILY MEDICINE

## 2022-06-28 PROCEDURE — 99213 OFFICE O/P EST LOW 20 MIN: CPT | Performed by: FAMILY MEDICINE

## 2022-06-28 RX ORDER — MELOXICAM 15 MG/1
15 TABLET ORAL DAILY
Qty: 30 TABLET | Refills: 3 | Status: SHIPPED | OUTPATIENT
Start: 2022-06-28 | End: 2022-11-03

## 2022-06-28 RX ORDER — MELOXICAM 15 MG/1
15 TABLET ORAL DAILY
Qty: 30 TABLET | Refills: 3 | Status: SHIPPED | OUTPATIENT
Start: 2022-06-28 | End: 2022-06-28 | Stop reason: CLARIF

## 2022-06-28 NOTE — PROGRESS NOTES
Chief Complaint  Follow-up (RT Hip)      FU new onset right lower back and gluteal pain with underlying multilevel degenerative disc disease with facet arthropathy and left lateral hip pain status post remote right total hip arthroplasty 2018 with suspected IT band and reactive right greater trochanteric bursitis    History of Present Illness:  Elidia Amador is a 78 y.o. male who is a retired white male who is a former  at [employer] who also had worked for the [employer] who is a patient of Dr. Rene Diehl's who is being seen today in kind consultation from Marshall Lopez PA-C for evaluation of newer onset of pain to his right lower back gluteal region and lateral aspect of his right hip. He is status post right hip arthroplasty by Dr. Darinel amador in 2018 and has had residual weakness to his left hip and leg since that time. He states that on roughly 5 the weekend of 5/15/2022, they were doing some frequent walking and aquatics exercises while vacationing in [facility] and did take some fairly steep 2 to 4 mile hikes and shortly thereafter began to notice pain into his lower back right gluteal region lateral aspect of his hip. There is no history of definitive fall or trauma but he has had a fairly consistent achy pain to his right gluteal region and lateral aspect of his hip with sharper 8 out of 10 pain with positional changes and walking. He is not really complaining of distal right leg numbness or tingling or achiness. He did see Marshall Lopez PA-C recently in the office last week and placed him on a Medrol pack which was ineffective prompting today's consultation. He has not had any type of physical therapy and does deny neurogenic bowel or bladder symptoms. He is being seen today for orthopedic and sports consultation with imaging. Dawson Bernstein was initially evaluated in the office on 5/31/2022 for both his lower back pain and right hip pain.   He presents back today stating that he is doing much better is at least 50 to 60% improved. He does believe the right greater trochanteric bursal injection helped him substantially is no longer having the constant burning pain laterally involving the hip. He has had a few sessions of physical therapy and is working on his exercise program but has been a little bit inconsistent with taking his anti-inflammatories and is taking his ibuprofen at best only 1 time daily. He is having much less difficulty with walking and positional changes been having to modify his workout. He did have some soreness after doing leg press at a lighter weight at the gym recently but improved with rest knees working on his stretching and flexibility. Strength does seem to be improving as well and he is denying high-grade radicular symptoms. He is sleeping more comfortably. No active substantial groin pain locking or catching. Medical History  Patient's medications, allergies, past medical, surgical, social and family histories were reviewed and updated as appropriate. Review of Systems  Pertinent items are noted in HPI  Review of systems reviewed from Patient History Form dated on 5/31/2022 and available in the patient's chart under the Media tab. Vital Signs  There were no vitals filed for this visit. General Exam:     Constitutional: Patient is adequately groomed with no evidence of malnutrition  DTRs: Deep tendon reflexes are intact  Mental Status: The patient is oriented to time, place and person. The patient's mood and affect are appropriate. Lymphatic: The lymphatic examination bilaterally reveals all areas to be without enlargement or induration. Vascular: Examination reveals no swelling or calf tenderness. Peripheral pulses are palpable and 2+. Neurological: The patient has good coordination. There is no weakness or sensory deficit.       Lumbar/Sacral Spine Examination  Inspection: There is no high-grade deformity or substantial soft tissue swelling. He is fairly tight. Palpation: Does have improved clinical tenderness over the lower lumbar paraspinals lower lumbar facets right greater than left with central gluteal and lateral gluteal tenderness on the right. He is also tender of the IT band and greater trochanteric bursa on the right. Rang of Motion: He can forward flex to about 60-65. He does have continued limitations in extension to the right does produce some lower back and right gluteal discomfort. 10-20% reduction in lateral bending rotation. IT band flexibility is slightly improved. Strength: There does not appear to be focal lower extremity weakness although he does have  4+ out of 5 strength loss with hip flexion and abduction on the right      Special Tests: His straight leg raising and logroll testing both appear to be negative although he does have a now negative Saul's and no longer has substantial tenderness over the trochanteric bursa on the right. Skin: There are no rashes, ulcerations or lesions. Distal motor sensory and vascular exams intact. Gait: Improving gait    Reflexes:  Symmetrically preserved     Additional Comments:     Additional Examinations:  Contralateral Exam: Examination of the left hip reveals intact skin. The patient demonstrates full painless range of motion with regards to flexion, abduction, internal and external rotation. There is not tenderness about the greater trochanter. There is a negative straight leg raise against resistance. Strength is 5/5 thorough out all planes. Right Lower Extremity: Examination of the right lower extremity does not show any tenderness, deformity or injury. Range of motion is unremarkable. There is no gross instability. There are no rashes, ulcerations or lesions. Strength and tone are normal.  Left Lower Extremity: Examination of the left lower extremity does not show any tenderness, deformity or injury.   Range of motion is unremarkable. There is no gross instability. There are no rashes, ulcerations or lesions. Strength and tone are normal.      Diagnostic Test Findings: AP and lateral lumbar spine films were reviewed from 5/31/2022 and does show multilevel lumbar degenerative disc disease with facet arthropathy and loss of lumbar lordosis. No evidence of high-grade acute compression injury. Assessment: #1.  6-week status post moderately improved partially rehabilitated aggravation mechanical low back pain with underlying lumbar degenerative disc disease spondylolysis and suspected gluteal tendinopathy with improved reactive right greater trochanteric bursitis and right hip IT band 4 years status post right total hip arthroplasty 2018    Impression:  Encounter Diagnoses   Name Primary?  Lumbar degenerative disc disease Yes    Lumbar pain     Lumbar spondylolysis     Greater trochanteric bursitis of right hip     It band syndrome, right        Office Procedures:  Orders Placed This Encounter   Procedures    Ambulatory referral to Physical Therapy     Referral Priority:   Routine     Referral Type:   Eval and Treat     Referral Reason:   Specialty Services Required     Requested Specialty:   Physical Therapist     Number of Visits Requested:   1       Treatment Plan:  Treatment options were discussed with Kelsey Lundberg. We did once again view his plain films and exam findings. He became symptomatic with pain in his right lower back and gluteal region as well as lateral hip after returning from vacation to [facility] in which they were doing walking and hiking as well as aquatic exercises in bed day 2022. He was found to have multilevel degenerative disc disease with facet arthropathy and was likely experiencing reactive trochanteric bursitis and IT band. He presents back today stating that he has improved 50 to 60% and seems to be progressing in physical therapy.   Has had 4 sessions thus far and we will change up on his anti-inflammatories from ibuprofen which she has only been taking 1 times daily to meloxicam and we will continue him in physical therapy. He is also in need of some balance retraining as well. The importance of performing his home exercise program was discussed. We held off on imaging currently I will see him back in 6 weeks. He will contact us in the interim with questions or concerns. CC: Dr. Coral Herrera and Sonja Martin PA-C      This dictation was performed with a verbal recognition program [facility] ) and it was checked for errors. It is possible that there are still dictated errors within this office note. If so, please bring any errors to my attention for an addendum. All efforts were made to ensure that this office note is accurate.

## 2022-06-30 ENCOUNTER — HOSPITAL ENCOUNTER (OUTPATIENT)
Dept: PHYSICAL THERAPY | Age: 77
Setting detail: THERAPIES SERIES
Discharge: HOME | End: 2022-06-30
Payer: MEDICARE

## 2022-06-30 PROCEDURE — 97530 THERAPEUTIC ACTIVITIES: CPT | Performed by: PHYSICAL THERAPIST

## 2022-06-30 PROCEDURE — 97110 THERAPEUTIC EXERCISES: CPT | Performed by: PHYSICAL THERAPIST

## 2022-06-30 PROCEDURE — 97112 NEUROMUSCULAR REEDUCATION: CPT | Performed by: PHYSICAL THERAPIST

## 2022-06-30 NOTE — FLOWSHEET NOTE
[address], [address] [place], [address] [facility]  Phone: [phone]   Fax: [phone]        Physical Therapy Treatment Note/ Progress Report:       Date:  2022    Patient Name:  Karina Contreras    :  1945  MRN: [id number]  Restrictions/Precautions:    Medical/Treatment Diagnosis Information:  Diagnosis: Lumbar pain - M54.50; Lumbar degenerative disc disease - M51.36, Lumbar spondylolysis - M43.06, Greater trochanteric bursitis of right hip - M70.61; It band syndrome, right - M76.31  Treatment Diagnosis: decreased ROM, strength, balance, and high-level iADLs  Insurance/Certification information:  PT Insurance Information: [facility]  Physician Information:  Dr. Katie Argueta  Has the plan of care been signed (Y/N):        []  Yes  [x]  No     Date of Patient follow up with Physician: 2022      Is this a Progress Report:     [x]  Yes  []  No        Progress report/ Recertification will be due (10 Rx or 30 days whichever is less): 2022      Visit # Insurance Allowable Auth Required   6 BMN []  Yes []  No        Functional Scale:   FOTO Lumbar Spine   - 43 -          Latex Allergy:  [x]NO      []YES  Preferred Language for Healthcare:   [x]English       []other:      Pain level: 1 /10 at rest, 1-2/ 10 walking      SUBJECTIVE:  Pt states his R thigh is sore today. He states he was trying to increase his HEP to daily and not sure if it is from that or from the piriformis stretch.        OBJECTIVE:    Observation:    Test measurements:         Standing Exam Normal Abnormal N/A Comments   Toe walk             Heel Walk           Side bending Normal         Pelvic Height           Fwd Bend- (aberrant juttering or innominate mvmt) Normal         Extension Normal         Trendelenburg   Abnormal   Weight shift   SLS/SLS with rotation   B    R>L B <5 sec                           Seated Exam Normal Abnormal N/A Comments   Pelvic Height           Seated Rotation Normal         Seated flexion           B hip IR                                   Supine Exam Normal Abnormal N/A Comments   Hip flexion [facility] WFL        Abduction [facility]  WFL       ER [facility]  WFL        IR Limited Limited       CATHIE/Da Normal     B   FA[place]          FADIR Normal          SLR Normal          Crossed SLR Normal          Prone Exam Normal Abnormal N/A Comments   Prone knee bend           Prone hip IR           B Achilles reflex/Pheasant           PA/Spring           Prone Instability test           Sacral Spring/thrust                                      ROM LEFT RIGHT Comments   Hip Flexion 95 98     Hip Abd 43 + hip ER 39     Hip ER 35 18     Hip IR 23 21     Hip Extension                      Strength LEFT RIGHT Comments   Multifidus         Transverse Ab     See Below   Hip Flexors 4+ 4+     Hip Abductors 4 4-     Hip Extensors 4 4     Knee Flex 4+ 4+     Knee Ext 4+ 4+                            TA Muscle Contraction Scale     Criteria                                                                                  Score  Quality of Contraction                                                                                                 Not Present     []? 0                                                                                                 Rapid, Superificial       []? 1                                                                                                 Just Perceptible          [x]? 2                                                                                                                   Gentle, Slow    []?  3     Substitution                                                                                                 Resting            []? 0                                                                                                 Moderate to Strong     []? 1 Subtle Perceptible       [x]? 2                                                                                                 None    []? 3     Symmetry of Contraction                                                                                                 Unilateral         []? 0                                                                                                 Bilateral/Asymmetrical            []? 1                                                                                                 Symmetrical    [x]? 2     Breathing                                                                                                 Inability/Difficulty Breathing during contraction                                                           [x]? 0                                                                                                 Able to hold contraction while Breathing                                                                        [x]? 1     Holding                                                                                                 Able to Hold Contraction <10 s                                                                                      []? 0                                                                                                 Able to Hold Contraction >10 s                                                                                      [x]?  1                                                                                                                _8_/10  Adapted from Stephanie atkinson, Copyright 2009       Joint mobility:               []?Normal                      []?Hypo              []?Hyper     Palpation: no TTP      Functional Mobility/Transfers:      Posture:      Gait: (include devices/WB status)       RESTRICTIONS/PRECAUTIONS: none      Exercises/Interventions:   Exercise Resistance/Repetitions Other comments   Bike 5'  Added 6/20   Stretching:      Hamstring stretch - supine 30 sec x 3 R/L Added 6/1   Piriformis stretch- figure 4 Knee to opposite shoulder     Supine lower thoracic-lumbar rotation     Sidelying thoracic rotation (open book)     Knee to chest     Standing IT band/side bend stretch Supine ITB with strap   30 sec x3 R/L  Added 6/14    Seated on heels low back stretch     Prone prop on elbow     Prone extn to outstretched hands     Standing lumbar extn     Single knee to chest     Double knee to chest     Seated forward flexion     Cat/camel     Hip flexor stretch half kneeling     Hip flexor stretch supine          Neural mobilization:          Strengthening/stabilization:     Supine SLRs 10 x 3 2# ^6/20   Hip ABD isometric 10 x 2 ^6/20   bridge 10 x 3 Added 6/1 6/14-Emphasis on glute squeeze    Glute set iso  Pelvic tilts     TrA contraction     TrA with ball squeeze     TrA contraction with alt marches     TrA with alt 90/90 heel taps     TrA with alt LE extn     Prone alt UE lift     Prone alt UE/LE lift  Added 6/27   Prone towel squeeze with B leg lift      Prone B hip extn with knees out and feet together     Prone leaning over edge of table hip extn 10x2 R/L 2# ^6/30   Supine B hip abd with TB     clams 10x2 R only 2# ^6/30   EOB seated Rev Clam  Standing clam with back foot against wall     Bridge- DL     Bridge with march     SL bridge     Bridge on [facility] on [place]     sidelying hip abd     Prone hip extn          Quadruped position with knee on airex with opposite hip lift     Bird/dog     Quadruped with hip abd     Quadruped with hip circles     Quadruped with foot against ball on wall     Opposite UE to LE isometric core     Leg drop hold     [place] System with glute set     Half kneeling on airex glute set + stabilization exercise     Lunge position hold with stabilization exercise     Hip abduction isometric into wall standing     Tandem stance + stabilization exercise     SLS     High march with opposite hip stabilization standing     TrA/mutlifidi walk outs     TrA/multifidi alternating push outs/ rotation Pallof Press Black Band   10x3 R/L Added 6/14-Base    Scapular retraction    Seated SB marches     Prone Ts     squats     Leg press Added 6/23   Hamstring curls Added 6/23   Lateral band walks     SB pikes     SB knee tucks     SB roll outs     planks     Side planks          Manual treatment:          sidelying lumbopelvic roll Prone IASTM Sidelying STM - hypervolt              Therapeutic Exercise and NMR EXR  [x] (85132) Provided verbal/tactile cueing for activities related to strengthening, flexibility, endurance, ROM  for improvements in proximal hip and core control with self care, mobility, lifting and ambulation. [x] (28993) Provided verbal/tactile cueing for activities related to improving balance, coordination, kinesthetic sense, posture, motor skill, proprioception  to assist with core control in self care, mobility, lifting, and ambulation.      Therapeutic Activities:    [x] (05895 or 12119) Provided verbal/tactile cueing for activities related to improving balance, coordination, kinesthetic sense, posture, motor skill, proprioception and motor activation to allow for proper function  with self care and ADLs  [] (47868) Provided training and instruction to the patient for proper core and proximal hip recruitment and positioning with ambulation re-education     Home Exercise Program:    [x] (23259) Reviewed/Progressed HEP activities related to strengthening, flexibility, endurance, ROM of core, proximal hip and LE for functional self-care, mobility, lifting and ambulation   [] (92485) Reviewed/Progressed HEP activities related to improving balance, coordination, kinesthetic sense, posture, motor skill, proprioception of core, proximal hip and LE for self care, mobility, lifting, and ambulation      Access Code: [email or web address]    Manual Treatments: PROM / STM / Oscillations-Mobs:  G-I, II, III, IV (PA's, Inf., Post.)  [] (85535) Provided manual therapy to mobilize proximal hip and LS spine soft tissue/joints for the purpose of modulating pain, promoting relaxation,  increasing ROM, reducing/eliminating soft tissue swelling/inflammation/restriction, improving soft tissue extensibility and allowing for proper ROM for normal function with self care, mobility, lifting and ambulation. Modalities:  Declined 6/30   [] GAME READY (VASO)- for significant edema, swelling, pain control. Charges:  Timed Code Treatment Minutes: 40'    Total Treatment Minutes: 39'       [] EVAL (LOW) 83823 (typically 20 minutes face-to-face)  [] EVAL (MOD) 40724 (typically 30 minutes face-to-face)  [] EVAL (HIGH) 85474 (typically 45 minutes face-to-face)  [] RE-EVAL     [x] HD(56807) x 1    [] IONTO  [x] NMR (25347) x 1   [] VASO  [] Manual (11716) x     [] Other:  [x] TA x 1      [] Mech Traction (39779)  [] ES(attended) (38784)      [] ES (un) (45208):         ASSESSMENT:    Goals: 6/27  Patient stated goal: alleviate current pain and leg and lower body strengthening     [x] Progressing: [] Met: [] Not Met: [] Adjusted    Therapist goals for Patient:   Short Term Goals: To be achieved in: 2 weeks  1. Independent in HEP and progression per patient tolerance, in order to prevent re-injury. [] Progressing: [x] Met: [] Not Met: [] Adjusted   2. Patient will have a decrease in pain to facilitate improvement in movement, function, and ADLs as indicated by Functional Deficits. [] Progressing: [x] Met: [] Not Met: [] Adjusted    Long Term Goals: To be achieved in: 4-6 weeks  1. Patient will score greater than or equal to 49/100 to assist with reaching prior level of function. [] Progressing: [x] Met: [] Not Met: [] Adjusted  2.  Patient will demonstrate increased R hip IR and ER AROM to greater than or equal to 30 deg to allow for proper joint functioning as indicated by patients Functional Deficits. [x] Progressing: [] Met: [] Not Met: [] Adjusted  3. Patient will demonstrate an increase in R hip (flex, ABD, and ext) strength to 4+/5 to allow for proper functional mobility as indicated by patients Functional Deficits. [x] Progressing: [] Met: [] Not Met: [] Adjusted  4. Patient will return to ambulating stairs without increased symptoms or restriction. [] Progressing: [x] Met: [] Not Met: [] Adjusted  5. Patient will report returning to independent workout routine without any pain. [x] Progressing: [] Met: [] Not Met: [] Adjusted     Overall Progression Towards Functional goals/ Treatment Progress Update:  [x] Patient is progressing as expected towards functional goals listed. [] Progression is slowed due to complexities/Impairments listed. [] Progression has been slowed due to co-morbidities. [] Plan just implemented, too soon to assess goals progression <30days   [] Goals require adjustment due to lack of progress  [] Patient is not progressing as expected and requires additional follow up with physician  [] Other    Prognosis for POC: [x] Good [] Fair  [] Poor      Patient requires continued skilled intervention: [x] Yes  [] No      Treatment/Activity Tolerance:  [x] Patient tolerated treatment well [] Patient limited by fatique  [] Patient limited by pain  [] Patient limited by other medical complications  [] Other: Pt job session well today even with c/o of soreness. He required min VCs to improve quad activation with supine SLRs. He reported soreness by the end of the session. 6/30    Patient education: Patient education on PT and plan of care including diagnosis, prognosis, treatment goals and options. Patient agrees with discussed POC and treatment and is aware of rehab process.  6/1      PLAN: 1x/ week for 4 weeks (6/27/22 - 7/25/22)   [x] Continue per plan of care [] Alter current plan (see comments above)  [] Plan of care initiated [] Hold pending MD visit [] Discharge      Electronically signed by:  Nathaly Young, PT, DPT     Note: If patient does not return for scheduled/ recommended follow up visits, this note will serve as a discharge from care along with most recent update on progress.

## 2022-07-06 ENCOUNTER — HOSPITAL ENCOUNTER (OUTPATIENT)
Dept: PHYSICAL THERAPY | Age: 77
Setting detail: THERAPIES SERIES
Discharge: HOME | End: 2022-07-06
Payer: MEDICARE

## 2022-07-06 PROCEDURE — 97112 NEUROMUSCULAR REEDUCATION: CPT | Performed by: PHYSICAL THERAPIST

## 2022-07-06 PROCEDURE — 97530 THERAPEUTIC ACTIVITIES: CPT | Performed by: PHYSICAL THERAPIST

## 2022-07-06 PROCEDURE — 97110 THERAPEUTIC EXERCISES: CPT | Performed by: PHYSICAL THERAPIST

## 2022-07-06 NOTE — FLOWSHEET NOTE
Danielle 79, [address] [place], [address] [facility]  Phone: [phone]   Fax: [phone]        Physical Therapy Treatment Note/ Progress Report:       Date:  2022    Patient Name:  Shayy Mancilla    :  1945  MRN: [id number]  Restrictions/Precautions:    Medical/Treatment Diagnosis Information:  Diagnosis: Lumbar pain - M54.50; Lumbar degenerative disc disease - M51.36, Lumbar spondylolysis - M43.06, Greater trochanteric bursitis of right hip - M70.61; It band syndrome, right - M76.31  Treatment Diagnosis: decreased ROM, strength, balance, and high-level iADLs  Insurance/Certification information:  PT Insurance Information: [facility] [employer] [facility]  Physician Information:  Dr. Antonia Truong  Has the plan of care been signed (Y/N):        []  Yes  [x]  No     Date of Patient follow up with Physician: 2022      Is this a Progress Report:     [x]  Yes  []  No        Progress report/ Recertification will be due (10 Rx or 30 days whichever is less): 2022      Visit # Insurance Allowable Auth Required   7 BMN []  Yes []  No        Functional Scale:   FOTO Lumbar Spine   - 43/ - 57/         Latex Allergy:  [x]NO      []YES  Preferred Language for Healthcare:   [x]English       []other:      Pain level: 0/ 10 at rest     SUBJECTIVE:  Pt states his foot is bothering him and has started to wear an ankle brace.        OBJECTIVE:    Observation:    Test measurements:         Standing Exam Normal Abnormal N/A Comments   Toe walk             Heel Walk           Side bending Normal         Pelvic Height           Fwd Bend- (aberrant juttering or innominate mvmt) Normal         Extension Normal         Trendelenburg   Abnormal   Weight shift   SLS/SLS with rotation   B    R>L B <5 sec                           Seated Exam Normal Abnormal N/A Comments   Pelvic Height           Seated Rotation Normal         Seated flexion           B hip IR                                   Supine Exam Normal Abnormal N/A Comments   Hip flexion [facility] WFL        Abduction [facility]  WFL       ER [facility]  WFL        IR Limited Limited       CATHIE/Da Normal     B   FAIR [place]ne          FADIR Normal          SLR Normal          Crossed SLR Normal          Prone Exam Normal Abnormal N/A Comments   Prone knee bend           Prone hip IR           B Achilles reflex/Pheasant           PA/Spring           Prone Instability test           Sacral Spring/thrust                                      ROM LEFT RIGHT Comments   Hip Flexion 95 98     Hip Abd 43 + hip ER 39     Hip ER 35 18     Hip IR 23 21     Hip Extension                      Strength LEFT RIGHT Comments   Multifidus         Transverse Ab     See Below   Hip Flexors 4+ 4+     Hip Abductors 4 4-     Hip Extensors 4 4     Knee Flex 4+ 4+     Knee Ext 4+ 4+                            TA Muscle Contraction Scale     Criteria                                                                                  Score  Quality of Contraction                                                                                                 Not Present     []? 0                                                                                                 Rapid, Superificial       []? 1                                                                                                 Just Perceptible          [x]? 2                                                                                                                   Gentle, Slow    []?  3     Substitution                                                                                                 Resting            []? 0                                                                                                 Moderate to Strong     []? 1                                                                                                  Subtle Perceptible       [x]? 2 None    []? 3     Symmetry of Contraction                                                                                                 Unilateral         []? 0                                                                                                 Bilateral/Asymmetrical            []? 1                                                                                                 Symmetrical    [x]? 2     Breathing                                                                                                 Inability/Difficulty Breathing during contraction                                                           [x]? 0                                                                                                 Able to hold contraction while Breathing                                                                        [x]? 1     Holding                                                                                                 Able to Hold Contraction <10 s                                                                                      []? 0                                                                                                 Able to Hold Contraction >10 s                                                                                      [x]?  1                                                                                                                _8_/10  Adapted from Patricia atkinson, Copyright 2009       Joint mobility:               []?Normal                      []?Hypo              []?Hyper     Palpation: no TTP      Functional Mobility/Transfers:      Posture:      Gait: (include devices/WB status)       RESTRICTIONS/PRECAUTIONS: none      Exercises/Interventions:   Exercise Resistance/Repetitions Other comments   Bike 5'  Added 6/20   Stretching:      Hamstring stretch - supine 30 sec x 3 R/L Added 6/1   Piriformis stretch- figure 4 Knee to opposite shoulder     Supine lower thoracic-lumbar rotation     Sidelying thoracic rotation (open book)     Knee to chest     Standing IT band/side bend stretch Supine ITB with strap   30 sec x3 R/L  Added 6/14    Seated on heels low back stretch     Prone prop on elbow     Prone extn to outstretched hands     Standing lumbar extn     Single knee to chest     Double knee to chest     Seated forward flexion     Cat/camel     Hip flexor stretch half kneeling     Hip flexor stretch supine          Neural mobilization:          Strengthening/stabilization:     Supine SLRs 10 x 3 2# ^6/20   Hip ABD SLR 10 x 3 ^7/6   bridge 10 x 3 Added 6/1 6/14-Emphasis on glute squeeze    Glute set iso  Pelvic tilts     TrA contraction     TrA with ball squeeze     TrA contraction with alt marches     TrA with alt 90/90 heel taps     TrA with alt LE extn     Prone alt UE lift     Prone alt UE/LE lift  Added 6/27   Prone towel squeeze with B leg lift      Prone B hip extn with knees out and feet together     Prone leaning over edge of table hip extn 10x2 R/L 2# ^6/30   Supine B hip abd with TB     clams  ^6/30   EOB seated Rev Clam  Standing clam with back foot against wall     Bridge- DL     Bridge with march     SL bridge     Bridge on [facility] on [place]     sidelying hip abd     Prone hip extn          Quadruped position with knee on airex with opposite hip lift     Bird/dog     Quadruped with hip abd     Quadruped with hip circles     Quadruped with foot against ball on wall     Opposite UE to LE isometric core     Leg drop hold     [place] System with glute set     Half kneeling on airex glute set + stabilization exercise     Lunge position hold with stabilization exercise     Hip abduction isometric into wall standing     Tandem stance + stabilization exercise     SLS     High march with opposite hip stabilization standing     TrA/mutlifidi walk outs     TrA/multifidi proximal hip and LS spine soft tissue/joints for the purpose of modulating pain, promoting relaxation,  increasing ROM, reducing/eliminating soft tissue swelling/inflammation/restriction, improving soft tissue extensibility and allowing for proper ROM for normal function with self care, mobility, lifting and ambulation. Modalities:  Declined 7/6   [] GAME READY (VASO)- for significant edema, swelling, pain control. Charges:  Timed Code Treatment Minutes: 40'    Total Treatment Minutes: 39'        [] EVAL (LOW) 05941 (typically 20 minutes face-to-face)  [] EVAL (MOD) 96597 (typically 30 minutes face-to-face)  [] EVAL (HIGH) 74419 (typically 45 minutes face-to-face)  [] RE-EVAL     [x] ID(78257) x 1    [] IONTO  [x] NMR (30516) x 1   [] VASO  [] Manual (04779) x     [] Other:  [x] TA x 1      [] Mech Traction (95925)  [] ES(attended) (12626)      [] ES (un) (26273):         ASSESSMENT:    Goals: 6/27  Patient stated goal: alleviate current pain and leg and lower body strengthening     [x] Progressing: [] Met: [] Not Met: [] Adjusted    Therapist goals for Patient:   Short Term Goals: To be achieved in: 2 weeks  1. Independent in HEP and progression per patient tolerance, in order to prevent re-injury. [] Progressing: [x] Met: [] Not Met: [] Adjusted   2. Patient will have a decrease in pain to facilitate improvement in movement, function, and ADLs as indicated by Functional Deficits. [] Progressing: [x] Met: [] Not Met: [] Adjusted    Long Term Goals: To be achieved in: 4-6 weeks  1. Patient will score greater than or equal to 49/100 to assist with reaching prior level of function. [] Progressing: [x] Met: [] Not Met: [] Adjusted  2. Patient will demonstrate increased R hip IR and ER AROM to greater than or equal to 30 deg to allow for proper joint functioning as indicated by patients Functional Deficits. [x] Progressing: [] Met: [] Not Met: [] Adjusted  3.  Patient will demonstrate an increase in R hip (flex, ABD, and ext) strength to 4+/5 to allow for proper functional mobility as indicated by patients Functional Deficits. [x] Progressing: [] Met: [] Not Met: [] Adjusted  4. Patient will return to ambulating stairs without increased symptoms or restriction. [] Progressing: [x] Met: [] Not Met: [] Adjusted  5. Patient will report returning to independent workout routine without any pain. [x] Progressing: [] Met: [] Not Met: [] Adjusted     Overall Progression Towards Functional goals/ Treatment Progress Update:  [x] Patient is progressing as expected towards functional goals listed. [] Progression is slowed due to complexities/Impairments listed. [] Progression has been slowed due to co-morbidities. [] Plan just implemented, too soon to assess goals progression <30days   [] Goals require adjustment due to lack of progress  [] Patient is not progressing as expected and requires additional follow up with physician  [] Other    Prognosis for POC: [x] Good [] Fair  [] Poor      Patient requires continued skilled intervention: [x] Yes  [] No      Treatment/Activity Tolerance:  [x] Patient tolerated treatment well [] Patient limited by fatique  [] Patient limited by pain  [] Patient limited by other medical complications  [] Other: Pt job session well. He job progression of hip ABD SLRs pain free today. He continues to progress with strength training pain free, reporting fatigue by the end of the workout. 7/6    Patient education: Patient education on PT and plan of care including diagnosis, prognosis, treatment goals and options. Patient agrees with discussed POC and treatment and is aware of rehab process.  6/1      PLAN: 1x/ week for 4 weeks (6/27/22 - 7/25/22)   [x] Continue per plan of care [] Alter current plan (see comments above)  [] Plan of care initiated [] Hold pending MD visit [] Discharge      Electronically signed by:  Isai Villatoro, PT, DPT     Note: If patient does not return for scheduled/ recommended follow up visits, this note will serve as a discharge from care along with most recent update on progress.

## 2022-07-11 ENCOUNTER — HOSPITAL ENCOUNTER (OUTPATIENT)
Dept: PHYSICAL THERAPY | Age: 77
Setting detail: THERAPIES SERIES
Discharge: HOME | End: 2022-07-11
Payer: MEDICARE

## 2022-07-11 PROCEDURE — 97112 NEUROMUSCULAR REEDUCATION: CPT | Performed by: PHYSICAL THERAPIST

## 2022-07-11 PROCEDURE — 97530 THERAPEUTIC ACTIVITIES: CPT | Performed by: PHYSICAL THERAPIST

## 2022-07-11 PROCEDURE — 97110 THERAPEUTIC EXERCISES: CPT | Performed by: PHYSICAL THERAPIST

## 2022-07-11 NOTE — FLOWSHEET NOTE
Aileen 85, [address] [place], [address] [facility]  Phone: [phone]   Fax: [phone]        Physical Therapy Treatment Note/ Progress Report:       Date:  2022    Patient Name:  Ray Castle    :  1945  MRN: [id number]  Restrictions/Precautions:    Medical/Treatment Diagnosis Information:  Diagnosis: Lumbar pain - M54.50; Lumbar degenerative disc disease - M51.36, Lumbar spondylolysis - M43.06, Greater trochanteric bursitis of right hip - M70.61; It band syndrome, right - M76.31  Treatment Diagnosis: decreased ROM, strength, balance, and high-level iADLs  Insurance/Certification information:  PT Insurance Information: [facility] [employer]  Physician Information:  Dr. Bernard Rader  Has the plan of care been signed (Y/N):        []  Yes  [x]  No     Date of Patient follow up with Physician: 2022      Is this a Progress Report:     [x]  Yes  []  No        Progress report/ Recertification will be due (10 Rx or 30 days whichever is less): 2022      Visit # Insurance Allowable Auth Required   8 BMN []  Yes []  No        Functional Scale:   FOTO Lumbar Spine   - 43/ - 57/         Latex Allergy:  [x]NO      []YES  Preferred Language for Healthcare:   [x]English       []other:      Pain level: 0 / 10 at rest     SUBJECTIVE:  Pt states no back/ hip pain over the weekend. He did report his ankle wasn't feeling good and his ankle brace gave him a wound that was not covered.       OBJECTIVE:    Observation:    Test measurements:         Standing Exam Normal Abnormal N/A Comments   Toe walk             Heel Walk           Side bending Normal         Pelvic Height           Fwd Bend- (aberrant juttering or innominate mvmt) Normal         Extension Normal         Trendelenburg   Abnormal   Weight shift   SLS/SLS with rotation   B    R>L B <5 sec                           Seated Exam Normal Abnormal N/A Comments   Pelvic Subtle Perceptible       [x]? 2                                                                                                 None    []? 3     Symmetry of Contraction                                                                                                 Unilateral         []? 0                                                                                                 Bilateral/Asymmetrical            []? 1                                                                                                 Symmetrical    [x]? 2     Breathing                                                                                                 Inability/Difficulty Breathing during contraction                                                           [x]? 0                                                                                                 Able to hold contraction while Breathing                                                                        [x]? 1     Holding                                                                                                 Able to Hold Contraction <10 s                                                                                      []? 0                                                                                                 Able to Hold Contraction >10 s                                                                                      [x]?  1                                                                                                                _8_/10  Adapted from Rosalba atkinson, Copyright 2009       Joint mobility:               []?Normal                      []?Hypo              []?Hyper     Palpation: no TTP      Functional Mobility/Transfers:      Posture:      Gait: (include devices/WB status)       RESTRICTIONS/PRECAUTIONS: none      Exercises/Interventions:   Exercise Resistance/Repetitions Other comments   Bike 5'  Added 6/20   Stretching:      Hamstring stretch - supine 30 sec x 3 R/L Added 6/1   Piriformis stretch- figure 4 Knee to opposite shoulder     Supine lower thoracic-lumbar rotation     Sidelying thoracic rotation (open book)     Knee to chest     Standing IT band/side bend stretch Supine ITB with strap   30 sec x3 R/L  Added 6/14    Seated on heels low back stretch     Prone prop on elbow     Prone extn to outstretched hands     Standing lumbar extn     Single knee to chest     Double knee to chest     Seated forward flexion     Cat/camel               Strengthening/stabilization:     Supine SLRs 10 x 3 2# ^6/20   Hip ABD SLR 10 x 3 ^7/6   bridge 10 x 3 Added 6/1 6/14-Emphasis on glute squeeze    Glute set iso  Pelvic tilts     TrA contraction     TrA with ball squeeze     TrA contraction with alt marches     TrA with alt 90/90 heel taps     TrA with alt LE extn     Prone alt UE lift     Prone alt UE/LE lift 10 x 3  Added 6/27   Prone towel squeeze with B leg lift      Prone B hip extn with knees out and feet together     Prone leaning over edge of table hip extn 10x2 R/L 2# ^6/30   Supine B hip abd with TB     clams  ^6/30   EOB seated Rev Clam  Standing clam with back foot against wall     Bridge- DL     Bridge with march     SL bridge     Bridge on [facility] on [place]     sidelying hip abd     Prone hip extn          Quadruped position with knee on airex with opposite hip lift     Bird/dog     Quadruped with hip abd     Quadruped with hip circles     Quadruped with foot against ball on wall     Opposite UE to LE isometric core     Leg drop hold     [place] System with glute set     Half kneeling on airex glute set + stabilization exercise     Lunge position hold with stabilization exercise     Hip abduction isometric into wall standing     Tandem stance + stabilization exercise     SLS     High march with opposite hip stabilization standing     TrA/mutlifidi walk outs     TrA/multifidi alternating push outs/ rotation Pallof Press Silver Band   10x3 R/L ^7/6   Scapular retraction    Seated SB marches     Prone Ts     squats     Leg press 10 x 3 140# Added 6/23   Hamstring curls 10 x 3 80# Added 6/23   Lateral band walks     SB pikes     SB knee tucks     SB roll outs     planks     Side planks          Manual treatment:          sidelying lumbopelvic roll Prone IASTM Sidelying STM - hypervolt              Therapeutic Exercise and NMR EXR  [x] (75867) Provided verbal/tactile cueing for activities related to strengthening, flexibility, endurance, ROM  for improvements in proximal hip and core control with self care, mobility, lifting and ambulation. [x] (28418) Provided verbal/tactile cueing for activities related to improving balance, coordination, kinesthetic sense, posture, motor skill, proprioception  to assist with core control in self care, mobility, lifting, and ambulation.      Therapeutic Activities:    [x] (55163 or 79128) Provided verbal/tactile cueing for activities related to improving balance, coordination, kinesthetic sense, posture, motor skill, proprioception and motor activation to allow for proper function  with self care and ADLs  [] (95284) Provided training and instruction to the patient for proper core and proximal hip recruitment and positioning with ambulation re-education     Home Exercise Program:    [x] (93601) Reviewed/Progressed HEP activities related to strengthening, flexibility, endurance, ROM of core, proximal hip and LE for functional self-care, mobility, lifting and ambulation   [] (92505) Reviewed/Progressed HEP activities related to improving balance, coordination, kinesthetic sense, posture, motor skill, proprioception of core, proximal hip and LE for self care, mobility, lifting, and ambulation      Access Code: [email or web address]    Manual Treatments:  PROM / STM / Oscillations-Mobs:  G-I, II, III, IV (PA's, Inf., Post.)  [] (63216) Provided manual therapy to mobilize proximal hip and LS spine soft tissue/joints for the purpose of modulating pain, promoting relaxation,  increasing ROM, reducing/eliminating soft tissue swelling/inflammation/restriction, improving soft tissue extensibility and allowing for proper ROM for normal function with self care, mobility, lifting and ambulation. Modalities:  Declined 7/11   [] GAME READY (VASO)- for significant edema, swelling, pain control. Charges:  Timed Code Treatment Minutes: 39'    Total Treatment Minutes: 55'       [] EVAL (LOW) 63106 (typically 20 minutes face-to-face)  [] EVAL (MOD) 23376 (typically 30 minutes face-to-face)  [] EVAL (HIGH) 38775 (typically 45 minutes face-to-face)  [] RE-EVAL     [x] OG(15517) x 1    [] IONTO  [x] NMR (70730) x 1   [] VASO  [] Manual (74172) x     [] Other:  [x] TA x 1      [] Mech Traction (56227)  [] ES(attended) (48434)      [] ES (un) (83379):         ASSESSMENT:    Goals: 6/27  Patient stated goal: alleviate current pain and leg and lower body strengthening     [x] Progressing: [] Met: [] Not Met: [] Adjusted    Therapist goals for Patient:   Short Term Goals: To be achieved in: 2 weeks  1. Independent in HEP and progression per patient tolerance, in order to prevent re-injury. [] Progressing: [x] Met: [] Not Met: [] Adjusted   2. Patient will have a decrease in pain to facilitate improvement in movement, function, and ADLs as indicated by Functional Deficits. [] Progressing: [x] Met: [] Not Met: [] Adjusted    Long Term Goals: To be achieved in: 4-6 weeks  1. Patient will score greater than or equal to 49/100 to assist with reaching prior level of function. [] Progressing: [x] Met: [] Not Met: [] Adjusted  2. Patient will demonstrate increased R hip IR and ER AROM to greater than or equal to 30 deg to allow for proper joint functioning as indicated by patients Functional Deficits. [x] Progressing: [] Met: [] Not Met: [] Adjusted  3.  Patient will demonstrate an increase in R hip (flex, ABD, and ext) strength to 4+/5 to allow for proper functional mobility as indicated by patients Functional Deficits. [x] Progressing: [] Met: [] Not Met: [] Adjusted  4. Patient will return to ambulating stairs without increased symptoms or restriction. [] Progressing: [x] Met: [] Not Met: [] Adjusted  5. Patient will report returning to independent workout routine without any pain. [x] Progressing: [] Met: [] Not Met: [] Adjusted     Overall Progression Towards Functional goals/ Treatment Progress Update:  [x] Patient is progressing as expected towards functional goals listed. [] Progression is slowed due to complexities/Impairments listed. [] Progression has been slowed due to co-morbidities. [] Plan just implemented, too soon to assess goals progression <30days   [] Goals require adjustment due to lack of progress  [] Patient is not progressing as expected and requires additional follow up with physician  [] Other    Prognosis for POC: [x] Good [] Fair  [] Poor      Patient requires continued skilled intervention: [x] Yes  [] No      Treatment/Activity Tolerance:  [x] Patient tolerated treatment well [] Patient limited by fatique  [] Patient limited by pain  [] Patient limited by other medical complications  [] Other: Pt job session well. He continues to fatigue with current resistance with exercises. He did job prone alternating UE/LE lifts today pain free, although he reported difficult. 7/11    Patient education: Patient education on PT and plan of care including diagnosis, prognosis, treatment goals and options. Patient agrees with discussed POC and treatment and is aware of rehab process.  6/1      PLAN: 1x/ week for 4 weeks (6/27/22 - 7/25/22)   [x] Continue per plan of care [] Alter current plan (see comments above)  [] Plan of care initiated [] Hold pending MD visit [] Discharge      Electronically signed by:  Benitez Teran, PT, DPT     Note: If patient does not return for scheduled/ recommended follow up visits, this note will serve as a discharge from care along with most recent update on progress.

## 2022-07-14 ENCOUNTER — HOSPITAL ENCOUNTER (OUTPATIENT)
Dept: PHYSICAL THERAPY | Age: 77
Setting detail: THERAPIES SERIES
Discharge: HOME | End: 2022-07-14
Payer: MEDICARE

## 2022-07-14 NOTE — FLOWSHEET NOTE
Physical Therapy  Cancellation/No-show Note  Patient Name:  Army Dos Santos  :  1945   Date:  2022  Cancelled visits to date: 01  No-shows to date: 0    For today's appointment patient:  [x]  Cancelled  []  Rescheduled appointment  []  No-show     Reason given by patient:  []  Patient ill  [x]  Conflicting appointment  []  No transportation    []  Conflict with work  []  No reason given  [x]  Other:     Comments:  Pt cancelled due to being \"double booked. \"     Electronically signed by:  Alberto Mccain, PT, DPT

## 2022-07-18 ENCOUNTER — HOSPITAL ENCOUNTER (OUTPATIENT)
Dept: PHYSICAL THERAPY | Age: 77
Setting detail: THERAPIES SERIES
Discharge: HOME | End: 2022-07-18
Payer: MEDICARE

## 2022-07-18 PROCEDURE — 97112 NEUROMUSCULAR REEDUCATION: CPT | Performed by: PHYSICAL THERAPIST

## 2022-07-18 PROCEDURE — 97530 THERAPEUTIC ACTIVITIES: CPT | Performed by: PHYSICAL THERAPIST

## 2022-07-18 PROCEDURE — 97110 THERAPEUTIC EXERCISES: CPT | Performed by: PHYSICAL THERAPIST

## 2022-07-18 NOTE — FLOWSHEET NOTE
[address] [facility],[address], [address] [place], [address]  Phone: [phone]   Fax: [phone]        Physical Therapy Treatment Note/ Progress Report:       Date:  2022    Patient Name:  Campbell Mustafa    :  1945  MRN: [id number]  Restrictions/Precautions:    Medical/Treatment Diagnosis Information:  Diagnosis: Lumbar pain - M54.50; Lumbar degenerative disc disease - M51.36, Lumbar spondylolysis - M43.06, Greater trochanteric bursitis of right hip - M70.61; It band syndrome, right - M76.31  Treatment Diagnosis: decreased ROM, strength, balance, and high-level iADLs  Insurance/Certification information:  PT Insurance Information: [employer]  Physician Information:  Dr. Guillermo Bermudez  Has the plan of care been signed (Y/N):        []  Yes  [x]  No     Date of Patient follow up with Physician: 2022      Is this a Progress Report:     [x]  Yes  []  No        Progress report/ Recertification will be due (10 Rx or 30 days whichever is less): 2022      Visit # Insurance Allowable Auth Required   9 BMN []  Yes []  No        Functional Scale:   FOTO Lumbar Spine   - 43/ - 57/         Latex Allergy:  [x]NO      []YES  Preferred Language for Healthcare:   [x]English       []other:      Pain level: 0 / 10 at rest     SUBJECTIVE:  Pt states the bottom of his feet feel funny. He states he has had an EMG, but have not received results. He denies any back/ hip pain over the weekend. He states he was not busy this weekend, but happy pain was not present.        OBJECTIVE:   Observation:   Test measurements:         Standing Exam Normal Abnormal N/A Comments   Toe walk             Heel Walk           Side bending Normal         Pelvic Height           Fwd Bend- (aberrant juttering or innominate mvmt) Normal         Extension Normal         Trendelenburg   Abnormal   Weight shift   SLS/SLS with rotation   B    R>L B <5 sec Seated Exam Normal Abnormal N/A Comments   Pelvic Height           Seated Rotation Normal         Seated flexion           B hip IR                                   Supine Exam Normal Abnormal N/A Comments   Hip flexion [facility] WFL        Abduction [facility]       ER [facility]  WFL        IR Limited Limited       CATHIE/Da Normal     B   FAIR [place]          FADIR Normal          SLR Normal          Crossed SLR Normal          Prone Exam Normal Abnormal N/A Comments   Prone knee bend           Prone hip IR           B Achilles reflex/Pheasant           PA/Spring           Prone Instability test           Sacral Spring/thrust                                      ROM LEFT RIGHT Comments   Hip Flexion 95 98     Hip Abd 43 + hip ER 39     Hip ER 35 18     Hip IR 23 21     Hip Extension                      Strength LEFT RIGHT Comments   Multifidus         Transverse Ab     See Below   Hip Flexors 4+ 4+     Hip Abductors 4 4-     Hip Extensors 4 4     Knee Flex 4+ 4+     Knee Ext 4+ 4+                            TA Muscle Contraction Scale     Criteria                                                                                  Score  Quality of Contraction                                                                                                 Not Present     [] 0                                                                                                 Rapid, Superificial       [] 1                                                                                                 Just Perceptible          [x] 2                                                                                                                   Gentle, Slow    [] 3     Substitution                                                                                                 Resting            [] 0                                                                                                 Moderate to Strong [] 1                                                                                                  Subtle Perceptible       [x] 2                                                                                                 None    [] 3     Symmetry of Contraction                                                                                                 Unilateral         [] 0                                                                                                 Bilateral/Asymmetrical            [] 1                                                                                                 Symmetrical    [x] 2     Breathing                                                                                                 Inability/Difficulty Breathing during contraction                                                           [x] 0                                                                                                 Able to hold contraction while Breathing                                                                        [x] 1     Holding                                                                                                 Able to Hold Contraction <10 s                                                                                      [] 0                                                                                                 Able to Hold Contraction >10 s                                                                                      [x] 1                                                                                                                _8_/10  Adapted from Richard atkinson, Copyright 2009       Joint mobility:               []Normal                      []Hypo              []Hyper     Palpation: no TTP      Functional Mobility/Transfers:      Posture:      Gait: (include devices/WB status)       RESTRICTIONS/PRECAUTIONS: none      Exercises/Interventions:   Exercise Resistance/Repetitions Other comments   Bike 5'  Added 6/20   Stretching:      Hamstring stretch - supine 30 sec x 3 R/L Added 6/1   Piriformis stretch- figure 4 Knee to opposite shoulder     Supine lower thoracic-lumbar rotation     Sidelying thoracic rotation (open book)     Knee to chest     Standing IT band/side bend stretch Supine ITB with strap   30 sec x3 R/L  Added 6/14    Seated on heels low back stretch     Prone prop on elbow     Prone extn to outstretched hands     Standing lumbar extn     Single knee to chest     Double knee to chest     Seated forward flexion     Cat/camel               Strengthening/stabilization:     Supine SLRs 10 x 3 2# ^6/20   Hip ABD SLR 10 x 3 2# ^7/18   bridge 10 x 3 Added 6/1 6/14-Emphasis on glute squeeze    Glute set iso  Pelvic tilts     TrA contraction     TrA with ball squeeze     TrA contraction with alt marches     TrA with alt 90/90 heel taps     TrA with alt LE extn     Prone alt UE lift     Prone alt UE/LE lift 10 x 3  Added 6/27   Prone towel squeeze with B leg lift      Prone B hip extn with knees out and feet together     Prone leaning over edge of table hip extn 10x2 R/L 2# ^6/30   Supine B hip abd with TB     clams 10x2 R only 2# ^6/30   EOB seated Rev Clam  Standing clam with back foot against wall     Bridge- DL     Bridge with march     SL bridge     Bridge on [facility] on [place]     sidelying hip abd     Prone hip extn          Opposite UE to LE isometric core     Leg drop hold     [place] System with glute set     Tandem stance + stabilization exercise     SLS     High march with opposite hip stabilization standing     TrA/mutlifidi walk outs     TrA/multifidi alternating push outs/ rotation [place] Press Silver Band   10x3 R/L  ^7/6   Scapular retraction    Seated SB marches     Prone Ts     squats     Leg press 10 x 3 140# Added 6/23   Hamstring curls 10 x 3 100# ^7/18   Lateral band walks     SB pikes     SB knee tucks     SB roll outs     planks     Side planks          Manual treatment:          sidelying lumbopelvic roll Prone IASTM Sidelying STM - hypervolt              Therapeutic Exercise and NMR EXR  [x] (16922) Provided verbal/tactile cueing for activities related to strengthening, flexibility, endurance, ROM  for improvements in proximal hip and core control with self care, mobility, lifting and ambulation. [x] (44638) Provided verbal/tactile cueing for activities related to improving balance, coordination, kinesthetic sense, posture, motor skill, proprioception  to assist with core control in self care, mobility, lifting, and ambulation.      Therapeutic Activities:    [x] (60419 or 94113) Provided verbal/tactile cueing for activities related to improving balance, coordination, kinesthetic sense, posture, motor skill, proprioception and motor activation to allow for proper function  with self care and ADLs  [] (95086) Provided training and instruction to the patient for proper core and proximal hip recruitment and positioning with ambulation re-education     Home Exercise Program:    [x] (54377) Reviewed/Progressed HEP activities related to strengthening, flexibility, endurance, ROM of core, proximal hip and LE for functional self-care, mobility, lifting and ambulation   [] (72908) Reviewed/Progressed HEP activities related to improving balance, coordination, kinesthetic sense, posture, motor skill, proprioception of core, proximal hip and LE for self care, mobility, lifting, and ambulation      Access Code: [email or web address]    Manual Treatments:  PROM / STM / Oscillations-Mobs:  G-I, II, III, IV (PA's, Inf., Post.)  [] (67597) Provided manual therapy to mobilize proximal hip and LS spine soft tissue/joints for the purpose of modulating pain, promoting relaxation,  increasing ROM, reducing/eliminating soft tissue swelling/inflammation/restriction, improving soft tissue extensibility and allowing for proper ROM for restriction. [] Progressing: [x] Met: [] Not Met: [] Adjusted  5. Patient will report returning to independent workout routine without any pain. [x] Progressing: [] Met: [] Not Met: [] Adjusted     Overall Progression Towards Functional goals/ Treatment Progress Update:  [x] Patient is progressing as expected towards functional goals listed. [] Progression is slowed due to complexities/Impairments listed. [] Progression has been slowed due to co-morbidities. [] Plan just implemented, too soon to assess goals progression <30days   [] Goals require adjustment due to lack of progress  [] Patient is not progressing as expected and requires additional follow up with physician  [] Other    Prognosis for POC: [x] Good [] Fair  [] Poor      Patient requires continued skilled intervention: [x] Yes  [] No      Treatment/Activity Tolerance:  [x] Patient tolerated treatment well [] Patient limited by fatique  [] Patient limited by pain  [] Patient limited by other medical complications  [] Other: Pt job session well, presenting to PT with improved symptoms. He job session well. He demonstrated increased strength, noted by increased resistance. He continues to have difficulty with prone alternating lifts. 7/18    Patient education: Patient education on PT and plan of care including diagnosis, prognosis, treatment goals and options. Patient agrees with discussed POC and treatment and is aware of rehab process. 6/1      PLAN: 1x/ week for 4 weeks (6/27/22 - 7/25/22)   [x] Continue per plan of care [] Alter current plan (see comments above)  [] Plan of care initiated [] Hold pending MD visit [] Discharge      Electronically signed by:  Swapnil Chin, PT, DPT     Note: If patient does not return for scheduled/ recommended follow up visits, this note will serve as a discharge from care along with most recent update on progress.

## 2022-07-21 ENCOUNTER — HOSPITAL ENCOUNTER (OUTPATIENT)
Dept: PHYSICAL THERAPY | Age: 77
Setting detail: THERAPIES SERIES
Discharge: HOME | End: 2022-07-21
Payer: MEDICARE

## 2022-07-21 PROCEDURE — 97110 THERAPEUTIC EXERCISES: CPT | Performed by: PHYSICAL THERAPIST

## 2022-07-21 PROCEDURE — 97112 NEUROMUSCULAR REEDUCATION: CPT | Performed by: PHYSICAL THERAPIST

## 2022-07-21 PROCEDURE — 97530 THERAPEUTIC ACTIVITIES: CPT | Performed by: PHYSICAL THERAPIST

## 2022-07-21 NOTE — FLOWSHEET NOTE
[address] [facility],[address], [address] [facility] [place], [address] [facility]  Phone: [phone]   Fax: [phone]        Physical Therapy Treatment Note/ Progress Report:       Date:  2022    Patient Name:  Torey Rueda    :  1945  MRN: [id number]  Restrictions/Precautions:    Medical/Treatment Diagnosis Information:  Diagnosis: Lumbar pain - M54.50; Lumbar degenerative disc disease - M51.36, Lumbar spondylolysis - M43.06, Greater trochanteric bursitis of right hip - M70.61; It band syndrome, right - M76.31  Treatment Diagnosis: decreased ROM, strength, balance, and high-level iADLs  Insurance/Certification information:  PT Insurance Information: [facility] [employer] [facility]  Physician Information:  Dr. Amy Borjas  Has the plan of care been signed (Y/N):        []  Yes  [x]  No     Date of Patient follow up with Physician: 2022      Is this a Progress Report:     [x]  Yes  []  No        Progress report/ Recertification will be due (10 Rx or 30 days whichever is less): 2022      Visit # Insurance Allowable Auth Required   10 BMN []  Yes []  No        Functional Scale:   FOTO Lumbar Spine   - 43/ - 57/         Latex Allergy:  [x]NO      []YES  Preferred Language for Healthcare:   [x]English       []other:      Pain level: 0 / 10 at rest     SUBJECTIVE:  Pt states he is having no pain.          OBJECTIVE:   Observation:   Test measurements:         Standing Exam Normal Abnormal N/A Comments   Toe walk             Heel Walk           Side bending Normal         Pelvic Height           Fwd Bend- (aberrant juttering or innominate mvmt) Normal         Extension Normal         Trendelenburg   Abnormal   Weight shift   SLS/SLS with rotation   B    R>L B <5 sec                           Seated Exam Normal Abnormal N/A Comments   Pelvic Height           Seated Rotation Normal         Seated flexion           B hip IR Supine Exam Normal Abnormal N/A Comments   Hip flexion [facility] WFL        Abduction [facility]       ER [facility]  WFL        IR Limited Limited       CATHIE/Da Normal     B   FAIR [place]          FADIR Normal          SLR Normal          Crossed SLR Normal          Prone Exam Normal Abnormal N/A Comments   Prone knee bend           Prone hip IR           B Achilles reflex/Pheasant           PA/Spring           Prone Instability test           Sacral Spring/thrust                                      ROM LEFT RIGHT Comments   Hip Flexion 95 98     Hip Abd 43 + hip ER 39     Hip ER 35 18     Hip IR 23 21     Hip Extension                      Strength LEFT RIGHT Comments   Multifidus         Transverse Ab     See Below   Hip Flexors 4+ 4+     Hip Abductors 4 4-     Hip Extensors 4 4     Knee Flex 4+ 4+     Knee Ext 4+ 4+                            TA Muscle Contraction Scale     Criteria                                                                                  Score  Quality of Contraction                                                                                                 Not Present     [] 0                                                                                                 Rapid, Superificial       [] 1                                                                                                 Just Perceptible          [x] 2                                                                                                                   Gentle, Slow    [] 3     Substitution                                                                                                 Resting            [] 0                                                                                                 Moderate to Strong     [] 1                                                                                                  Subtle Perceptible       [x] 2 None    [] 3     Symmetry of Contraction                                                                                                 Unilateral         [] 0                                                                                                 Bilateral/Asymmetrical            [] 1                                                                                                 Symmetrical    [x] 2     Breathing                                                                                                 Inability/Difficulty Breathing during contraction                                                           [x] 0                                                                                                 Able to hold contraction while Breathing                                                                        [x] 1     Holding                                                                                                 Able to Hold Contraction <10 s                                                                                      [] 0                                                                                                 Able to Hold Contraction >10 s                                                                                      [x] 1                                                                                                                _8_/10  Adapted from Rosalba atkinson, Copyright 2009       Joint mobility:               []Normal                      []Hypo              []Hyper     Palpation: no TTP      Functional Mobility/Transfers:      Posture:      Gait: (include devices/WB status)       RESTRICTIONS/PRECAUTIONS: none      Exercises/Interventions:   Exercise Resistance/Repetitions Other comments   Bike 5'  Added 6/20   Stretching:      Hamstring stretch - supine 30 sec x 3 R/L Added 6/1   Piriformis stretch- figure 4 Knee to opposite shoulder     Supine lower thoracic-lumbar rotation 10 x 3  Added 7/21   Sidelying thoracic rotation (open book)     Knee to chest     Standing IT band/side bend stretch Supine ITB with strap   30 sec x3 R/L  Added 6/14    Seated on heels low back stretch     Prone prop on elbow     Prone extn to outstretched hands     Standing lumbar extn     Single knee to chest     Double knee to chest     Seated forward flexion     Cat/camel               Strengthening/stabilization:     Supine SLRs 10 x 3 2# ^6/20   Hip ABD SLR 10 x 3 2# ^7/18   bridge 10 sec x 10  ^7/21   Glute set iso  Pelvic tilts     TrA contraction     TrA with ball squeeze     TrA contraction with alt marches     TrA with alt 90/90 heel taps     TrA with alt LE extn     Prone alt UE lift     Prone alt UE/LE lift 10 x 3  Added 6/27   Prone towel squeeze with B leg lift      Prone B hip extn with knees out and feet together     Prone leaning over edge of table hip extn 10x2 R/L 2# ^6/30   Supine B hip abd with TB     clams EOB seated Rev Clam  Standing clam with back foot against wall     Bridge- DL     Bridge with march     SL bridge     Bridge on [facility] on [place]     sidelying hip abd     Prone hip extn          Opposite UE to LE isometric core     Leg drop hold     [place] System with glute set     Tandem stance + stabilization exercise     SLS     High march with opposite hip stabilization standing     TrA/mutlifidi walk outs     TrA/multifidi rotation Silver Band 10x3 R/L  ^7/6   Side stepping 35' x 2 laps Added 7/21   Seated SB marches     Prone Ts     squats     Leg press 10 x 3 160# ^7/21   Hamstring curls 10 x 3 120# ^7/21   Lateral band walks     SB pikes     SB knee tucks     SB roll outs     planks     Side planks          Manual treatment:          sidelying lumbopelvic roll Prone IASTM Sidelying STM - hypervolt              Therapeutic Exercise and NMR EXR  [x] (48914) Provided verbal/tactile cueing for activities related to strengthening, flexibility, endurance, ROM  for improvements in proximal hip and core control with self care, mobility, lifting and ambulation. [x] (93750) Provided verbal/tactile cueing for activities related to improving balance, coordination, kinesthetic sense, posture, motor skill, proprioception  to assist with core control in self care, mobility, lifting, and ambulation. Therapeutic Activities:    [x] (00423 or 43074) Provided verbal/tactile cueing for activities related to improving balance, coordination, kinesthetic sense, posture, motor skill, proprioception and motor activation to allow for proper function  with self care and ADLs  [] (17864) Provided training and instruction to the patient for proper core and proximal hip recruitment and positioning with ambulation re-education     Home Exercise Program:    [x] (00704) Reviewed/Progressed HEP activities related to strengthening, flexibility, endurance, ROM of core, proximal hip and LE for functional self-care, mobility, lifting and ambulation   [] (87670) Reviewed/Progressed HEP activities related to improving balance, coordination, kinesthetic sense, posture, motor skill, proprioception of core, proximal hip and LE for self care, mobility, lifting, and ambulation      Access Code: [email or web address]    Manual Treatments:  PROM / STM / Oscillations-Mobs:  G-I, II, III, IV (PA's, Inf., Post.)  [] (95375) Provided manual therapy to mobilize proximal hip and LS spine soft tissue/joints for the purpose of modulating pain, promoting relaxation,  increasing ROM, reducing/eliminating soft tissue swelling/inflammation/restriction, improving soft tissue extensibility and allowing for proper ROM for normal function with self care, mobility, lifting and ambulation. Modalities:  Declined 7/21   [] GAME READY (VASO)- for significant edema, swelling, pain control.      Charges:  Timed Code Treatment Minutes: 48'    Total Treatment Minutes: 62'       [] EVAL (LOW) 66277 Update:  [x] Patient is progressing as expected towards functional goals listed. [] Progression is slowed due to complexities/Impairments listed. [] Progression has been slowed due to co-morbidities. [] Plan just implemented, too soon to assess goals progression <30days   [] Goals require adjustment due to lack of progress  [] Patient is not progressing as expected and requires additional follow up with physician  [] Other    Prognosis for POC: [x] Good [] Fair  [] Poor      Patient requires continued skilled intervention: [x] Yes  [] No      Treatment/Activity Tolerance:  [x] Patient tolerated treatment well [] Patient limited by fatique  [] Patient limited by pain  [] Patient limited by other medical complications  [] Other: Pt job session well. He demonstrated increased strength, noted by increased resistance. He job the addition of side stepping pain free. He progressed to trunk rotation with no pain. Will re-assess next session. 7/21    Patient education: Patient education on PT and plan of care including diagnosis, prognosis, treatment goals and options. Patient agrees with discussed POC and treatment and is aware of rehab process. 6/1      PLAN: 1x/ week for 4 weeks (6/27/22 - 7/25/22)   [x] Continue per plan of care [] Alter current plan (see comments above)  [] Plan of care initiated [] Hold pending MD visit [] Discharge      Electronically signed by:  Zeny Vasques PT, DPT     Note: If patient does not return for scheduled/ recommended follow up visits, this note will serve as a discharge from care along with most recent update on progress.

## 2022-07-25 ENCOUNTER — APPOINTMENT (OUTPATIENT)
Dept: PHYSICAL THERAPY | Age: 77
End: 2022-07-25
Payer: MEDICARE

## 2022-07-27 ENCOUNTER — APPOINTMENT (OUTPATIENT)
Dept: PHYSICAL THERAPY | Age: 77
End: 2022-07-27
Payer: MEDICARE

## 2022-11-02 DIAGNOSIS — M51.36 LUMBAR DEGENERATIVE DISC DISEASE: ICD-10-CM

## 2022-11-02 DIAGNOSIS — M54.50 LUMBAR PAIN: ICD-10-CM

## 2022-11-02 DIAGNOSIS — M70.61 GREATER TROCHANTERIC BURSITIS OF RIGHT HIP: ICD-10-CM

## 2022-11-02 DIAGNOSIS — M76.31 IT BAND SYNDROME, RIGHT: ICD-10-CM

## 2022-11-02 DIAGNOSIS — M43.06 LUMBAR SPONDYLOLYSIS: ICD-10-CM

## 2022-11-03 RX ORDER — MELOXICAM 15 MG/1
TABLET ORAL
Qty: 30 TABLET | Refills: 3 | Status: SHIPPED | OUTPATIENT
Start: 2022-11-03

## 2024-02-26 ENCOUNTER — TELEPHONE (OUTPATIENT)
Dept: ORTHOPEDIC SURGERY | Age: 79
End: 2024-02-26

## 2024-02-26 NOTE — TELEPHONE ENCOUNTER
Appointment Request     Patient requesting earlier appointment: Yes  Appointment offered to patient: NO  Patient Contact Number: [phone]    PATIENT CALLING REGARDING SCHEDULING AN  APPOINTMENT FOR HIS LT ANKLE. REQUESTING A CORTISONE INJECTION.    PLEASE CALL BACK PATIENT AT THE ABOVE NUMBER.

## 2024-02-26 NOTE — TELEPHONE ENCOUNTER
[facility] FOR PATIENT THAT DR. LOZADA IS CURRENTLY OUT RECOVERING FROM ANKLE REPLACEMENT SX. TOLD HIM I WOULD BE HAPPY TO SCHEDULE HIM AN APPOINTMENT WITH ANOTHER PROVIDER. ALSO LET HIM KNOW DR. LOZADA WILL BE BACK THE WEEK OF MARCH 11TH AND I COULD SCHEDULE HIM AN APPOINTMENT THEN. GAVE HIM MY DIRECT NUMBER TO CALL ME BACK.

## 2024-03-12 ENCOUNTER — OFFICE VISIT (OUTPATIENT)
Dept: ORTHOPEDIC SURGERY | Age: 79
End: 2024-03-12
Payer: MEDICARE

## 2024-03-12 VITALS — WEIGHT: 215 LBS | HEIGHT: 67 IN | BODY MASS INDEX: 33.74 KG/M2

## 2024-03-12 DIAGNOSIS — M21.6X2 ACQUIRED PLANOVALGUS DEFORMITY OF LEFT FOOT: ICD-10-CM

## 2024-03-12 DIAGNOSIS — M19.072 OSTEOARTHRITIS OF LEFT ANKLE, UNSPECIFIED OSTEOARTHRITIS TYPE: ICD-10-CM

## 2024-03-12 DIAGNOSIS — M25.572 CHRONIC PAIN OF LEFT ANKLE: Primary | ICD-10-CM

## 2024-03-12 DIAGNOSIS — G89.29 CHRONIC PAIN OF LEFT ANKLE: Primary | ICD-10-CM

## 2024-03-12 PROCEDURE — 1123F ACP DISCUSS/DSCN MKR DOCD: CPT | Performed by: FAMILY MEDICINE

## 2024-03-12 PROCEDURE — 99214 OFFICE O/P EST MOD 30 MIN: CPT | Performed by: FAMILY MEDICINE

## 2024-03-12 RX ORDER — SILDENAFIL 100 MG/1
100 TABLET, FILM COATED ORAL DAILY PRN
COMMUNITY
Start: 2024-01-31

## 2024-03-12 NOTE — PROGRESS NOTES
for second opinion consultation regarding this given his pain and functional impairment as well as quality of life.  He will contact us in the interim with questions or concerns        This dictation was performed with a verbal recognition program (DRAGON) and it was checked for errors. It is possible that there are still dictated errors within this office note. If so, please bring any errors to my attention for an addendum. All efforts were made to ensure that this office note is accurate.

## (undated) DEVICE — SST TWIST DRILL, STANDARD, 3.2MM DIA. X 127MM: Brand: MICROAIRE®

## (undated) DEVICE — COVER XR CASS W21XL40IN UNIV ADH MICROSHIELD

## (undated) DEVICE — 1010 S-DRAPE TOWEL DRAPE 10/BX: Brand: STERI-DRAPE™

## (undated) DEVICE — HOLDER SCALP PLAS G STD

## (undated) DEVICE — PACK,BASIC: Brand: MEDLINE

## (undated) DEVICE — MARKER RAD KNEE TIB CKPT STEREOTAXIC IMAG LESION LOC

## (undated) DEVICE — BIT DRILL SINGLE USE

## (undated) DEVICE — T4 HOOD

## (undated) DEVICE — 3M™ STERI-DRAPE™ INSTRUMENT POUCH 1018: Brand: STERI-DRAPE™

## (undated) DEVICE — SHEET,DRAPE,53X77,STERILE: Brand: MEDLINE

## (undated) DEVICE — GARMENT,MEDLINE,DVT,INT,CALF,MED, GEN2: Brand: MEDLINE

## (undated) DEVICE — SUTURE VCRL SZ 2-0 L18IN ABSRB UD CT-1 L36MM 1/2 CIR J839D

## (undated) DEVICE — BLADE ES L2.75IN ELASTOMERIC COAT DURABLE BEND UPTO 90DEG

## (undated) DEVICE — TOTAL TRAY, 16FR 10ML SIL FOLEY, URN: Brand: MEDLINE

## (undated) DEVICE — KIT TRK HIP PROC VIZADISC

## (undated) DEVICE — BANDAGE COBAN 6 IN WND 6INX5YD FOAM

## (undated) DEVICE — GLOVE SURG SZ 8 L12IN FNGR THK13MIL BRN LTX SYN POLYMER W

## (undated) DEVICE — GOWN,SIRUS,POLYRNF,BRTHSLV,XLN/XXL,18/CS: Brand: MEDLINE

## (undated) DEVICE — Z INACTIVE NO SUPPLIER SOLUTIONIRRIG 3000ML 0.9% SOD CHL FLX CONT [79720808] [HOSPIRA WORLDWIDE INC]

## (undated) DEVICE — 3M™ WARMING BLANKET, UPPER BODY, 10 PER CASE, 42268: Brand: BAIR HUGGER™

## (undated) DEVICE — Device

## (undated) DEVICE — COVER,MAYO STAND,XL,STERILE: Brand: MEDLINE

## (undated) DEVICE — SYSTEM SKIN CLSR 22CM DERMBND PRINEO

## (undated) DEVICE — COVER LT HNDL BLU PLAS

## (undated) DEVICE — 2108 SERIES SAGITTAL BLADE (19.5 X 1.27 X 90.0MM)

## (undated) DEVICE — SUTURE STRATAFIX SPRL SZ 1 L14IN ABSRB VLT L48CM CTX 1/2 SXPD2B405

## (undated) DEVICE — SUTURE VCRL SZ 2 L27IN ABSRB VLT L65MM TP-1 1/2 CIR J649G

## (undated) DEVICE — TURNOVER KIT RM INF CTRL TECH

## (undated) DEVICE — BIPOLAR SEALER 23-112-1 AQM 6.0: Brand: AQUAMANTYS ®

## (undated) DEVICE — ELECTRODE PT RET AD L9FT HI MOIST COND ADH HYDRGEL CORDED

## (undated) DEVICE — SUTURE VCRL SZ 0 L18IN ABSRB UD L36MM CT-1 1/2 CIR J840D

## (undated) DEVICE — APPLICATOR PREP 26ML 0.7% IOD POVACRYLEX 74% ISO ALC ST

## (undated) DEVICE — TIP IRR FEM CNL DISP FOR BTTRY PWD INTERPULSE

## (undated) DEVICE — PROTECTOR UNIV FOAM EXTREMITY DEVON

## (undated) DEVICE — GOWN,SIRUS,POLYRNF,SETINSLV,L,20/CS: Brand: MEDLINE

## (undated) DEVICE — SIPS DUAL 2 MINUTE TIP

## (undated) DEVICE — PREVENA INCISION MANAGEMENT SYSTEM- PEEL & PLACE DRESSING: Brand: PREVENA™ PEEL & PLACE™

## (undated) DEVICE — MARKER RAD 3.5MM HEX CKPT STEREOTAXIC IMAG LESION LOC FOR

## (undated) DEVICE — SPONGE,LAP,18"X18",DLX,XR,ST,5/PK,40/PK: Brand: MEDLINE

## (undated) DEVICE — INTENDED TO AID IN THE PASSING OF SUTURES THROUGH BONE AND SOFT TISSUE DURING ORTHOPEDIC SURGERY: Brand: HOFFEE SUTURE RETRIEVER

## (undated) DEVICE — 3M™ TEGADERM™ TRANSPARENT FILM DRESSING FRAME STYLE, 1627, 4 IN X 10 IN (10 CM X 25 CM), 20/CT 4CT/CASE: Brand: 3M™ TEGADERM™

## (undated) DEVICE — SUTURE ETHBND EXCEL SZ 5 L30IN NONABSORBABLE GRN L40MM V-37 MB66G

## (undated) DEVICE — CANNULA NSL AD TBNG L7FT PVC STR NONFLARED PRNG O2 DEL W STD

## (undated) DEVICE — COAXIAL HIGH FLOW TIP WITH SOFT SHIELD

## (undated) DEVICE — GOWN,SIRUS,POLYRNF,SETINSLV,XL,20/CS: Brand: MEDLINE

## (undated) DEVICE — PIN BNE FIX TEMP L140MM DIA4MM MAKO

## (undated) DEVICE — 450 ML BOTTLE OF 0.05% CHLORHEXIDINE GLUCONATE IN 99.95% STERILE WATER FOR IRRIGATION, USP AND APPLICATOR.: Brand: IRRISEPT ANTIMICROBIAL WOUND LAVAGE

## (undated) DEVICE — SURE SET-DOUBLE BASIN-LF: Brand: MEDLINE INDUSTRIES, INC.

## (undated) DEVICE — GOWN,SIRUS,FABRNF,L,20/CS: Brand: MEDLINE